# Patient Record
Sex: MALE | Race: WHITE | NOT HISPANIC OR LATINO | Employment: OTHER | URBAN - METROPOLITAN AREA
[De-identification: names, ages, dates, MRNs, and addresses within clinical notes are randomized per-mention and may not be internally consistent; named-entity substitution may affect disease eponyms.]

---

## 2017-10-17 ENCOUNTER — HISTORICAL (OUTPATIENT)
Dept: LAB | Facility: HOSPITAL | Age: 72
End: 2017-10-17

## 2017-10-17 LAB
ALBUMIN SERPL-MCNC: 3.8 GM/DL (ref 3.4–5)
ALBUMIN/GLOB SERPL: 1.2 RATIO (ref 1.1–2)
ALP SERPL-CCNC: 113 UNIT/L (ref 46–116)
ALT SERPL-CCNC: 35 UNIT/L (ref 12–78)
APPEARANCE, UA: CLEAR
AST SERPL-CCNC: 29 UNIT/L (ref 15–37)
BACTERIA SPEC CULT: NORMAL
BILIRUB SERPL-MCNC: 0.5 MG/DL (ref 0.2–1)
BILIRUB UR QL STRIP: NEGATIVE
BILIRUBIN DIRECT+TOT PNL SERPL-MCNC: 0.12 MG/DL (ref 0–0.2)
BILIRUBIN DIRECT+TOT PNL SERPL-MCNC: 0.36 MG/DL (ref 0–0.8)
BUN SERPL-MCNC: 27.1 MG/DL (ref 7–18)
CALCIUM SERPL-MCNC: 9.1 MG/DL (ref 8.5–10.1)
CHLORIDE SERPL-SCNC: 110 MMOL/L (ref 98–107)
CHOLEST SERPL-MCNC: 179 MG/DL (ref 0–200)
CHOLEST/HDLC SERPL: 5.3 {RATIO} (ref 0–5)
CO2 SERPL-SCNC: 29.1 MMOL/L (ref 21–32)
COLOR UR: YELLOW
CREAT SERPL-MCNC: 1.4 MG/DL (ref 0.6–1.3)
CREAT UR-MCNC: 145.6 MG/DL (ref 30–125)
ERYTHROCYTE [DISTWIDTH] IN BLOOD BY AUTOMATED COUNT: 15.2 % (ref 11.5–17)
EST. AVERAGE GLUCOSE BLD GHB EST-MCNC: 108 MG/DL
GLOBULIN SER-MCNC: 3.3 GM/DL (ref 2.4–3.5)
GLUCOSE (UA): NEGATIVE
GLUCOSE SERPL-MCNC: 105 MG/DL (ref 74–106)
HBA1C MFR BLD: 5.4 % (ref 4.5–6.2)
HCT VFR BLD AUTO: 46.3 % (ref 42–52)
HDLC SERPL-MCNC: 34 MG/DL (ref 40–60)
HGB BLD-MCNC: 15.2 GM/DL (ref 14–18)
HGB UR QL STRIP: NEGATIVE
KETONES UR QL STRIP: NEGATIVE
LDLC SERPL CALC-MCNC: 115 MG/DL (ref 0–129)
LEUKOCYTE ESTERASE UR QL STRIP: NEGATIVE
MAGNESIUM SERPL-MCNC: 1.8 MG/DL (ref 1.8–2.4)
MCH RBC QN AUTO: 28.2 PG (ref 27–31)
MCHC RBC AUTO-ENTMCNC: 32.9 GM/DL (ref 33–36)
MCV RBC AUTO: 85.5 FL (ref 80–94)
NITRITE UR QL STRIP: NEGATIVE
PH UR STRIP: 5.5 [PH] (ref 5–9)
PHOSPHATE SERPL-MCNC: 3.4 MG/DL (ref 2.5–4.9)
PLATELET # BLD AUTO: 149 X10(3)/MCL (ref 130–400)
PMV BLD AUTO: 7.6 FL (ref 7.4–10.4)
POTASSIUM SERPL-SCNC: 4.6 MMOL/L (ref 3.5–5.1)
PROT SERPL-MCNC: 7.1 GM/DL (ref 6.4–8.2)
PROT UR QL STRIP: 30
PROT UR STRIP-MCNC: 49.6 MG/DL
PTH-INTACT SERPL-MCNC: 24.7 PG/DL (ref 14–72)
RBC # BLD AUTO: 5.42 X10(6)/MCL (ref 4.7–6.1)
RBC #/AREA URNS HPF: NORMAL /[HPF]
SODIUM SERPL-SCNC: 145 MMOL/L (ref 136–145)
SP GR UR STRIP: 1.02 (ref 1–1.03)
SQUAMOUS EPITHELIAL, UA: NORMAL
TRIGL SERPL-MCNC: 150 MG/DL
TSH SERPL-ACNC: 2.81 MIU/ML (ref 0.36–3.74)
URATE SERPL-MCNC: 4.9 MG/DL (ref 3.4–7)
UROBILINOGEN UR STRIP-ACNC: 0.2
VLDLC SERPL CALC-MCNC: 30 MG/DL
WBC # SPEC AUTO: 4.9 X10(3)/MCL (ref 4.5–11.5)
WBC #/AREA URNS HPF: NORMAL /HPF

## 2018-04-05 ENCOUNTER — HISTORICAL (OUTPATIENT)
Dept: ADMINISTRATIVE | Facility: HOSPITAL | Age: 73
End: 2018-04-05

## 2018-04-05 LAB
ABS NEUT (OLG): 5.6
ALBUMIN SERPL-MCNC: 4.3 GM/DL (ref 3.4–5)
ALBUMIN/GLOB SERPL: 1.65 {RATIO} (ref 1.5–2.5)
ALP SERPL-CCNC: 59 UNIT/L (ref 38–126)
ALT SERPL-CCNC: 22 UNIT/L (ref 7–52)
AST SERPL-CCNC: 29 UNIT/L (ref 15–37)
BILIRUB SERPL-MCNC: 0.7 MG/DL (ref 0.2–1)
BILIRUBIN DIRECT+TOT PNL SERPL-MCNC: 0.3 MG/DL (ref 0–0.5)
BUN SERPL-MCNC: 26 MG/DL (ref 7–18)
CALCIUM SERPL-MCNC: 8.9 MG/DL (ref 8.5–10)
CHLORIDE SERPL-SCNC: 108 MMOL/L (ref 98–107)
CHOLEST SERPL-MCNC: 127 MG/DL (ref 0–200)
CHOLEST/HDLC SERPL: 3.7 {RATIO}
CO2 SERPL-SCNC: 27 MMOL/L (ref 21–32)
CREAT SERPL-MCNC: 1.19 MG/DL (ref 0.6–1.3)
CREAT/UREA NIT SERPL: 21.8
ERYTHROCYTE [DISTWIDTH] IN BLOOD BY AUTOMATED COUNT: 14.3 % (ref 11.5–17)
GGT SERPL-CCNC: 16 UNIT/L (ref 5–85)
GLOBULIN SER-MCNC: 2.6 GM/DL (ref 1.2–3)
GLUCOSE SERPL-MCNC: 107 MG/DL (ref 74–106)
HCT VFR BLD AUTO: 49.9 % (ref 42–52)
HDLC SERPL-MCNC: 34 MG/DL (ref 35–60)
HGB BLD-MCNC: 16.7 GM/DL (ref 14–18)
LDH SERPL-CCNC: 161 UNIT/L (ref 140–271)
LDLC SERPL CALC-MCNC: 67 MG/DL (ref 0–129)
LYMPHOCYTES # BLD AUTO: 1.4 X10(3)/MCL (ref 0.6–3.4)
LYMPHOCYTES NFR BLD AUTO: 18.5 % (ref 13–40)
MCH RBC QN AUTO: 29.3 PG (ref 27–31.2)
MCHC RBC AUTO-ENTMCNC: 34 GM/DL (ref 32–36)
MCV RBC AUTO: 88 FL (ref 80–94)
MONOCYTES # BLD AUTO: 0.7 X10(3)/MCL (ref 0–1.8)
MONOCYTES NFR BLD AUTO: 9.5 % (ref 0.1–24)
NEUTROPHILS NFR BLD AUTO: 72 % (ref 47–80)
PLATELET # BLD AUTO: 177 X10(3)/MCL (ref 130–400)
PMV BLD AUTO: 9.5 FL
POTASSIUM SERPL-SCNC: 4.2 MMOL/L (ref 3.5–5.1)
PROT SERPL-MCNC: 6.9 GM/DL (ref 6.4–8.2)
RBC # BLD AUTO: 5.7 X10(6)/MCL (ref 4.7–6.1)
SODIUM SERPL-SCNC: 139 MMOL/L (ref 136–145)
T4 FREE SERPL-MCNC: 1.2 NG/DL (ref 0.76–1.46)
TRIGL SERPL-MCNC: 127 MG/DL (ref 30–150)
TSH SERPL-ACNC: 2.79 MIU/ML (ref 0.35–4.94)
VLDLC SERPL CALC-MCNC: 25.4 MG/DL
WBC # SPEC AUTO: 7.7 X10(3)/MCL (ref 4.5–11.5)

## 2018-06-19 ENCOUNTER — HISTORICAL (OUTPATIENT)
Dept: RADIOLOGY | Facility: HOSPITAL | Age: 73
End: 2018-06-19

## 2018-09-18 ENCOUNTER — HISTORICAL (OUTPATIENT)
Dept: ADMINISTRATIVE | Facility: HOSPITAL | Age: 73
End: 2018-09-18

## 2018-09-27 ENCOUNTER — HISTORICAL (OUTPATIENT)
Dept: ADMINISTRATIVE | Facility: HOSPITAL | Age: 73
End: 2018-09-27

## 2018-09-27 LAB
ALBUMIN SERPL-MCNC: 3.5 GM/DL (ref 3.4–5)
ALBUMIN/GLOB SERPL: 1.1 {RATIO}
ALP SERPL-CCNC: 91 UNIT/L (ref 50–136)
ALT SERPL-CCNC: 40 UNIT/L (ref 12–78)
APPEARANCE, UA: CLEAR
AST SERPL-CCNC: 30 UNIT/L (ref 15–37)
BACTERIA SPEC CULT: NORMAL /HPF
BILIRUB SERPL-MCNC: 0.4 MG/DL (ref 0.2–1)
BILIRUB UR QL STRIP: NEGATIVE
BILIRUBIN DIRECT+TOT PNL SERPL-MCNC: 0.2 MG/DL (ref 0–0.2)
BILIRUBIN DIRECT+TOT PNL SERPL-MCNC: 0.2 MG/DL (ref 0–0.8)
BUN SERPL-MCNC: 29 MG/DL (ref 7–18)
CALCIUM SERPL-MCNC: 8.4 MG/DL (ref 8.5–10.1)
CHLORIDE SERPL-SCNC: 111 MMOL/L (ref 98–107)
CHOLEST SERPL-MCNC: 107 MG/DL (ref 0–200)
CHOLEST/HDLC SERPL: 3.3 {RATIO} (ref 0–5)
CO2 SERPL-SCNC: 28 MMOL/L (ref 21–32)
COLOR UR: YELLOW
CREAT SERPL-MCNC: 1.29 MG/DL (ref 0.7–1.3)
CREAT UR-MCNC: 186 MG/DL
ERYTHROCYTE [DISTWIDTH] IN BLOOD BY AUTOMATED COUNT: 13.8 % (ref 11.5–17)
GLOBULIN SER-MCNC: 3.2 GM/DL (ref 2.4–3.5)
GLUCOSE (UA): NEGATIVE
GLUCOSE SERPL-MCNC: 91 MG/DL (ref 74–106)
HCT VFR BLD AUTO: 45.1 % (ref 42–52)
HDLC SERPL-MCNC: 32 MG/DL (ref 35–60)
HGB BLD-MCNC: 14.4 GM/DL (ref 14–18)
HGB UR QL STRIP: NEGATIVE
KETONES UR QL STRIP: NEGATIVE
LDLC SERPL CALC-MCNC: 43 MG/DL (ref 0–129)
LEUKOCYTE ESTERASE UR QL STRIP: NEGATIVE
MAGNESIUM SERPL-MCNC: 2 MG/DL (ref 1.8–2.4)
MCH RBC QN AUTO: 29 PG (ref 27–31)
MCHC RBC AUTO-ENTMCNC: 31.9 GM/DL (ref 33–36)
MCV RBC AUTO: 90.7 FL (ref 80–94)
NITRITE UR QL STRIP: NEGATIVE
PH UR STRIP: 5 [PH] (ref 5–9)
PHOSPHATE SERPL-MCNC: 3.3 MG/DL (ref 2.5–4.9)
PLATELET # BLD AUTO: 162 X10(3)/MCL (ref 130–400)
PMV BLD AUTO: 9.2 FL (ref 9.4–12.4)
POTASSIUM SERPL-SCNC: 4.5 MMOL/L (ref 3.5–5.1)
PROT SERPL-MCNC: 6.7 GM/DL (ref 6.4–8.2)
PROT UR QL STRIP: NEGATIVE
PROT UR STRIP-MCNC: 11 MG/DL
PTH-INTACT SERPL-MCNC: 32 PG/ML (ref 18.4–80.1)
RBC # BLD AUTO: 4.97 X10(6)/MCL (ref 4.7–6.1)
RBC #/AREA URNS HPF: NORMAL /[HPF]
SODIUM SERPL-SCNC: 146 MMOL/L (ref 136–145)
SP GR UR STRIP: 1.02 (ref 1–1.03)
SQUAMOUS EPITHELIAL, UA: NORMAL
TRIGL SERPL-MCNC: 160 MG/DL (ref 30–150)
TSH SERPL-ACNC: 3.3 MIU/L (ref 0.36–3.74)
URATE SERPL-MCNC: 4.6 MG/DL (ref 2.6–7.2)
UROBILINOGEN UR STRIP-ACNC: 1
VLDLC SERPL CALC-MCNC: 32 MG/DL
WBC # SPEC AUTO: 6.2 X10(3)/MCL (ref 4.5–11.5)
WBC #/AREA URNS HPF: NORMAL /[HPF]

## 2018-12-04 ENCOUNTER — HISTORICAL (OUTPATIENT)
Dept: ADMINISTRATIVE | Facility: HOSPITAL | Age: 73
End: 2018-12-04

## 2018-12-20 ENCOUNTER — HISTORICAL (OUTPATIENT)
Dept: PHYSICAL THERAPY | Facility: HOSPITAL | Age: 73
End: 2018-12-20

## 2018-12-21 ENCOUNTER — HISTORICAL (OUTPATIENT)
Dept: PHYSICAL THERAPY | Facility: HOSPITAL | Age: 73
End: 2018-12-21

## 2018-12-24 ENCOUNTER — HISTORICAL (OUTPATIENT)
Dept: PHYSICAL THERAPY | Facility: HOSPITAL | Age: 73
End: 2018-12-24

## 2018-12-27 ENCOUNTER — HISTORICAL (OUTPATIENT)
Dept: PHYSICAL THERAPY | Facility: HOSPITAL | Age: 73
End: 2018-12-27

## 2018-12-28 ENCOUNTER — HISTORICAL (OUTPATIENT)
Dept: PHYSICAL THERAPY | Facility: HOSPITAL | Age: 73
End: 2018-12-28

## 2018-12-31 ENCOUNTER — HISTORICAL (OUTPATIENT)
Dept: PHYSICAL THERAPY | Facility: HOSPITAL | Age: 73
End: 2018-12-31

## 2019-01-02 ENCOUNTER — HISTORICAL (OUTPATIENT)
Dept: PHYSICAL THERAPY | Facility: HOSPITAL | Age: 74
End: 2019-01-02

## 2019-01-04 ENCOUNTER — HISTORICAL (OUTPATIENT)
Dept: PHYSICAL THERAPY | Facility: HOSPITAL | Age: 74
End: 2019-01-04

## 2019-01-07 ENCOUNTER — HISTORICAL (OUTPATIENT)
Dept: PHYSICAL THERAPY | Facility: HOSPITAL | Age: 74
End: 2019-01-07

## 2019-01-08 ENCOUNTER — HISTORICAL (OUTPATIENT)
Dept: ADMINISTRATIVE | Facility: HOSPITAL | Age: 74
End: 2019-01-08

## 2019-01-09 ENCOUNTER — HISTORICAL (OUTPATIENT)
Dept: PHYSICAL THERAPY | Facility: HOSPITAL | Age: 74
End: 2019-01-09

## 2019-01-11 ENCOUNTER — HISTORICAL (OUTPATIENT)
Dept: PHYSICAL THERAPY | Facility: HOSPITAL | Age: 74
End: 2019-01-11

## 2019-01-14 ENCOUNTER — HISTORICAL (OUTPATIENT)
Dept: PHYSICAL THERAPY | Facility: HOSPITAL | Age: 74
End: 2019-01-14

## 2019-01-16 ENCOUNTER — HISTORICAL (OUTPATIENT)
Dept: PHYSICAL THERAPY | Facility: HOSPITAL | Age: 74
End: 2019-01-16

## 2019-01-18 ENCOUNTER — HISTORICAL (OUTPATIENT)
Dept: PHYSICAL THERAPY | Facility: HOSPITAL | Age: 74
End: 2019-01-18

## 2019-01-21 ENCOUNTER — HISTORICAL (OUTPATIENT)
Dept: PHYSICAL THERAPY | Facility: HOSPITAL | Age: 74
End: 2019-01-21

## 2019-01-23 ENCOUNTER — HISTORICAL (OUTPATIENT)
Dept: PHYSICAL THERAPY | Facility: HOSPITAL | Age: 74
End: 2019-01-23

## 2019-01-25 ENCOUNTER — HISTORICAL (OUTPATIENT)
Dept: PHYSICAL THERAPY | Facility: HOSPITAL | Age: 74
End: 2019-01-25

## 2019-01-25 ENCOUNTER — HISTORICAL (OUTPATIENT)
Dept: RADIOLOGY | Facility: HOSPITAL | Age: 74
End: 2019-01-25

## 2019-01-28 ENCOUNTER — HISTORICAL (OUTPATIENT)
Dept: PHYSICAL THERAPY | Facility: HOSPITAL | Age: 74
End: 2019-01-28

## 2019-01-30 ENCOUNTER — HISTORICAL (OUTPATIENT)
Dept: PHYSICAL THERAPY | Facility: HOSPITAL | Age: 74
End: 2019-01-30

## 2019-02-01 ENCOUNTER — HISTORICAL (OUTPATIENT)
Dept: PHYSICAL THERAPY | Facility: HOSPITAL | Age: 74
End: 2019-02-01

## 2019-02-05 ENCOUNTER — HISTORICAL (OUTPATIENT)
Dept: PHYSICAL THERAPY | Facility: HOSPITAL | Age: 74
End: 2019-02-05

## 2019-04-30 ENCOUNTER — HISTORICAL (OUTPATIENT)
Dept: ADMINISTRATIVE | Facility: HOSPITAL | Age: 74
End: 2019-04-30

## 2019-05-21 ENCOUNTER — HISTORICAL (OUTPATIENT)
Dept: ADMINISTRATIVE | Facility: HOSPITAL | Age: 74
End: 2019-05-21

## 2019-09-04 ENCOUNTER — HISTORICAL (OUTPATIENT)
Dept: ADMINISTRATIVE | Facility: HOSPITAL | Age: 74
End: 2019-09-04

## 2019-09-04 LAB
CHOLEST SERPL-MCNC: 117 MG/DL (ref 0–200)
CHOLEST/HDLC SERPL: 2.7 {RATIO} (ref 0–5)
HDLC SERPL-MCNC: 43 MG/DL (ref 35–60)
LDLC SERPL CALC-MCNC: 56 MG/DL (ref 0–129)
TRIGL SERPL-MCNC: 89 MG/DL (ref 30–150)
VLDLC SERPL CALC-MCNC: 18 MG/DL

## 2019-10-01 ENCOUNTER — HISTORICAL (OUTPATIENT)
Dept: ADMINISTRATIVE | Facility: HOSPITAL | Age: 74
End: 2019-10-01

## 2019-10-01 ENCOUNTER — HISTORICAL (OUTPATIENT)
Dept: LAB | Facility: HOSPITAL | Age: 74
End: 2019-10-01

## 2019-10-01 LAB
ALBUMIN SERPL-MCNC: 3.7 GM/DL (ref 3.4–5)
ALBUMIN/GLOB SERPL: 1.3 RATIO (ref 1.1–2)
ALP SERPL-CCNC: 73 UNIT/L (ref 50–136)
ALT SERPL-CCNC: 30 UNIT/L (ref 12–78)
APPEARANCE, UA: CLEAR
AST SERPL-CCNC: 29 UNIT/L (ref 15–37)
BACTERIA SPEC CULT: NORMAL /HPF
BILIRUB SERPL-MCNC: 0.7 MG/DL (ref 0.2–1)
BILIRUB UR QL STRIP: NEGATIVE
BILIRUBIN DIRECT+TOT PNL SERPL-MCNC: 0.3 MG/DL (ref 0–0.5)
BILIRUBIN DIRECT+TOT PNL SERPL-MCNC: 0.4 MG/DL (ref 0–0.8)
BUN SERPL-MCNC: 32 MG/DL (ref 7–18)
CALCIUM SERPL-MCNC: 8.6 MG/DL (ref 8.5–10.1)
CHLORIDE SERPL-SCNC: 108 MMOL/L (ref 98–107)
CHOLEST SERPL-MCNC: 123 MG/DL (ref 0–200)
CHOLEST/HDLC SERPL: 3.6 {RATIO} (ref 0–5)
CO2 SERPL-SCNC: 32 MMOL/L (ref 21–32)
COLOR STL: NORMAL
COLOR UR: YELLOW
CONSISTENCY STL: NORMAL
CREAT SERPL-MCNC: 1.26 MG/DL (ref 0.7–1.3)
ERYTHROCYTE [DISTWIDTH] IN BLOOD BY AUTOMATED COUNT: 14.5 % (ref 11.5–17)
GLOBULIN SER-MCNC: 2.9 GM/DL (ref 2.4–3.5)
GLUCOSE (UA): NEGATIVE
GLUCOSE SERPL-MCNC: 92 MG/DL (ref 74–106)
HCT VFR BLD AUTO: 46.8 % (ref 42–52)
HDLC SERPL-MCNC: 34 MG/DL (ref 35–60)
HEMOCCULT SP1 STL QL: NEGATIVE
HGB BLD-MCNC: 14.9 GM/DL (ref 14–18)
HGB UR QL STRIP: NEGATIVE
KETONES UR QL STRIP: NEGATIVE
LDLC SERPL CALC-MCNC: 61 MG/DL (ref 0–129)
LEUKOCYTE ESTERASE UR QL STRIP: NEGATIVE
MAGNESIUM SERPL-MCNC: 2 MG/DL (ref 1.8–2.4)
MCH RBC QN AUTO: 27.7 PG (ref 27–31)
MCHC RBC AUTO-ENTMCNC: 31.8 GM/DL (ref 33–36)
MCV RBC AUTO: 87 FL (ref 80–94)
NITRITE UR QL STRIP: NEGATIVE
PH UR STRIP: 5 [PH] (ref 5–9)
PHOSPHATE SERPL-MCNC: 3.3 MG/DL (ref 2.5–4.9)
PLATELET # BLD AUTO: 143 X10(3)/MCL (ref 130–400)
PMV BLD AUTO: 9.5 FL (ref 9.4–12.4)
POTASSIUM SERPL-SCNC: 4.4 MMOL/L (ref 3.5–5.1)
PROT SERPL-MCNC: 6.6 GM/DL (ref 6.4–8.2)
PROT UR QL STRIP: NEGATIVE
RBC # BLD AUTO: 5.38 X10(6)/MCL (ref 4.7–6.1)
RBC #/AREA URNS HPF: NORMAL /HPF (ref 0–2)
SODIUM SERPL-SCNC: 143 MMOL/L (ref 136–145)
SP GR UR STRIP: 1.02 (ref 1–1.03)
SQUAMOUS EPITHELIAL, UA: NORMAL /HPF (ref 0–4)
TRIGL SERPL-MCNC: 141 MG/DL (ref 30–150)
TSH SERPL-ACNC: 2.17 MIU/L (ref 0.36–3.74)
URATE SERPL-MCNC: 5.1 MG/DL (ref 2.6–7.2)
UROBILINOGEN UR STRIP-ACNC: 1
VLDLC SERPL CALC-MCNC: 28 MG/DL
WBC # SPEC AUTO: 9.4 X10(3)/MCL (ref 4.5–11.5)
WBC #/AREA URNS HPF: NORMAL /HPF (ref 0–3)

## 2019-10-21 ENCOUNTER — HISTORICAL (OUTPATIENT)
Dept: PHYSICAL THERAPY | Facility: HOSPITAL | Age: 74
End: 2019-10-21

## 2019-10-23 ENCOUNTER — HISTORICAL (OUTPATIENT)
Dept: PHYSICAL THERAPY | Facility: HOSPITAL | Age: 74
End: 2019-10-23

## 2019-10-25 ENCOUNTER — HISTORICAL (OUTPATIENT)
Dept: PHYSICAL THERAPY | Facility: HOSPITAL | Age: 74
End: 2019-10-25

## 2019-10-28 ENCOUNTER — HISTORICAL (OUTPATIENT)
Dept: PHYSICAL THERAPY | Facility: HOSPITAL | Age: 74
End: 2019-10-28

## 2019-10-30 ENCOUNTER — HISTORICAL (OUTPATIENT)
Dept: PHYSICAL THERAPY | Facility: HOSPITAL | Age: 74
End: 2019-10-30

## 2019-10-31 ENCOUNTER — HISTORICAL (OUTPATIENT)
Dept: PHYSICAL THERAPY | Facility: HOSPITAL | Age: 74
End: 2019-10-31

## 2019-11-04 ENCOUNTER — HISTORICAL (OUTPATIENT)
Dept: PHYSICAL THERAPY | Facility: HOSPITAL | Age: 74
End: 2019-11-04

## 2019-11-06 ENCOUNTER — HISTORICAL (OUTPATIENT)
Dept: PHYSICAL THERAPY | Facility: HOSPITAL | Age: 74
End: 2019-11-06

## 2019-11-11 ENCOUNTER — HISTORICAL (OUTPATIENT)
Dept: PHYSICAL THERAPY | Facility: HOSPITAL | Age: 74
End: 2019-11-11

## 2019-11-13 ENCOUNTER — HISTORICAL (OUTPATIENT)
Dept: PHYSICAL THERAPY | Facility: HOSPITAL | Age: 74
End: 2019-11-13

## 2019-11-15 ENCOUNTER — HISTORICAL (OUTPATIENT)
Dept: PHYSICAL THERAPY | Facility: HOSPITAL | Age: 74
End: 2019-11-15

## 2019-11-18 ENCOUNTER — HISTORICAL (OUTPATIENT)
Dept: PHYSICAL THERAPY | Facility: HOSPITAL | Age: 74
End: 2019-11-18

## 2020-02-10 ENCOUNTER — HISTORICAL (OUTPATIENT)
Dept: ADMINISTRATIVE | Facility: HOSPITAL | Age: 75
End: 2020-02-10

## 2020-02-10 LAB
ABS NEUT (OLG): 5.1 X10(3)/MCL (ref 2.1–9.2)
ALBUMIN SERPL-MCNC: 4.2 GM/DL (ref 3.4–5)
ALBUMIN/GLOB SERPL: 2.1 {RATIO} (ref 1.5–2.5)
ALP SERPL-CCNC: 53 UNIT/L (ref 38–126)
ALT SERPL-CCNC: 19 UNIT/L (ref 7–52)
AST SERPL-CCNC: 24 UNIT/L (ref 15–37)
BILIRUB SERPL-MCNC: 0.6 MG/DL (ref 0.2–1)
BILIRUBIN DIRECT+TOT PNL SERPL-MCNC: 0.2 MG/DL (ref 0–0.5)
BILIRUBIN DIRECT+TOT PNL SERPL-MCNC: 0.4 MG/DL
BUN SERPL-MCNC: 36 MG/DL (ref 7–18)
CALCIUM SERPL-MCNC: 8.9 MG/DL (ref 8.5–10)
CHLORIDE SERPL-SCNC: 105 MMOL/L (ref 98–107)
CHOLEST SERPL-MCNC: 108 MG/DL (ref 0–200)
CHOLEST/HDLC SERPL: 4.2 {RATIO}
CO2 SERPL-SCNC: 26 MMOL/L (ref 21–32)
CREAT SERPL-MCNC: 1.44 MG/DL (ref 0.6–1.3)
ERYTHROCYTE [DISTWIDTH] IN BLOOD BY AUTOMATED COUNT: 14.1 % (ref 11.5–17)
GLOBULIN SER-MCNC: 2 GM/DL (ref 1.2–3)
GLUCOSE SERPL-MCNC: 105 MG/DL (ref 74–106)
HCT VFR BLD AUTO: 47.4 % (ref 42–52)
HDLC SERPL-MCNC: 26 MG/DL (ref 35–60)
HGB BLD-MCNC: 15.5 GM/DL (ref 14–18)
LDLC SERPL CALC-MCNC: 59 MG/DL (ref 0–129)
LYMPHOCYTES # BLD AUTO: 1.7 X10(3)/MCL (ref 0.6–3.4)
LYMPHOCYTES NFR BLD AUTO: 22 % (ref 13–40)
MCH RBC QN AUTO: 28 PG (ref 27–31.2)
MCHC RBC AUTO-ENTMCNC: 33 GM/DL (ref 32–36)
MCV RBC AUTO: 86 FL (ref 80–94)
MONOCYTES # BLD AUTO: 0.9 X10(3)/MCL (ref 0.1–1.3)
MONOCYTES NFR BLD AUTO: 11.5 % (ref 0.1–24)
NEUTROPHILS NFR BLD AUTO: 66.5 % (ref 47–80)
PLATELET # BLD AUTO: 179 X10(3)/MCL (ref 130–400)
PMV BLD AUTO: 9.8 FL (ref 9.4–12.4)
POTASSIUM SERPL-SCNC: 4.5 MMOL/L (ref 3.5–5.1)
PROT SERPL-MCNC: 6.2 GM/DL (ref 6.4–8.2)
RBC # BLD AUTO: 5.54 X10(6)/MCL (ref 4.7–6.1)
SODIUM SERPL-SCNC: 142 MMOL/L (ref 136–145)
TRIGL SERPL-MCNC: 125 MG/DL (ref 30–150)
TSH SERPL-ACNC: 2.79 MIU/ML (ref 0.35–4.94)
VLDLC SERPL CALC-MCNC: 25 MG/DL
WBC # SPEC AUTO: 7.7 X10(3)/MCL (ref 4.5–11.5)

## 2020-06-16 ENCOUNTER — HISTORICAL (OUTPATIENT)
Dept: ADMINISTRATIVE | Facility: HOSPITAL | Age: 75
End: 2020-06-16

## 2020-06-16 LAB
APPEARANCE, UA: CLEAR
BACTERIA #/AREA URNS AUTO: ABNORMAL /HPF
BILIRUB UR QL STRIP: NEGATIVE MG/DL
COLOR UR: YELLOW
GLUCOSE (UA): NEGATIVE MG/DL
HGB UR QL STRIP: NEGATIVE UNIT/L
KETONES UR QL STRIP: NEGATIVE MG/DL
LEUKOCYTE ESTERASE UR QL STRIP: NEGATIVE UNIT/L
NITRITE UR QL STRIP.AUTO: NEGATIVE
PH UR STRIP: 7 [PH]
PROT UR QL STRIP: NEGATIVE MG/DL
RBC #/AREA URNS HPF: ABNORMAL /HPF
SP GR UR STRIP: 1.02
SQUAMOUS EPITHELIAL, UA: ABNORMAL /LPF
UROBILINOGEN UR STRIP-ACNC: 2 MG/DL
WBC #/AREA URNS AUTO: ABNORMAL /[HPF]

## 2020-06-18 LAB — FINAL CULTURE: NO GROWTH

## 2020-10-12 ENCOUNTER — HISTORICAL (OUTPATIENT)
Dept: ADMINISTRATIVE | Facility: HOSPITAL | Age: 75
End: 2020-10-12

## 2020-10-12 LAB
ALBUMIN SERPL-MCNC: 3.8 GM/DL (ref 3.4–4.8)
ALBUMIN/GLOB SERPL: 1.4 RATIO (ref 1.1–2)
ALP SERPL-CCNC: 76 UNIT/L (ref 40–150)
ALT SERPL-CCNC: 22 UNIT/L (ref 0–55)
APPEARANCE, UA: ABNORMAL
AST SERPL-CCNC: 28 UNIT/L (ref 5–34)
BACTERIA SPEC CULT: ABNORMAL /HPF
BILIRUB SERPL-MCNC: 0.6 MG/DL
BILIRUB UR QL STRIP: ABNORMAL
BILIRUBIN DIRECT+TOT PNL SERPL-MCNC: 0.3 MG/DL (ref 0–0.5)
BILIRUBIN DIRECT+TOT PNL SERPL-MCNC: 0.3 MG/DL (ref 0–0.8)
BUN SERPL-MCNC: 34.5 MG/DL (ref 8.4–25.7)
CALCIUM SERPL-MCNC: 8.7 MG/DL (ref 8.8–10)
CHLORIDE SERPL-SCNC: 108 MMOL/L (ref 98–107)
CHOLEST SERPL-MCNC: 111 MG/DL
CHOLEST/HDLC SERPL: 4 {RATIO} (ref 0–5)
CO2 SERPL-SCNC: 27 MMOL/L (ref 23–31)
COLOR UR: YELLOW
CREAT SERPL-MCNC: 1.32 MG/DL (ref 0.73–1.18)
CREAT UR-MCNC: 340.5 MG/DL (ref 58–161)
DEPRECATED CALCIDIOL+CALCIFEROL SERPL-MC: 15.9 NG/ML (ref 6.6–49.9)
ERYTHROCYTE [DISTWIDTH] IN BLOOD BY AUTOMATED COUNT: 14.9 % (ref 11.5–17)
GLOBULIN SER-MCNC: 2.7 GM/DL (ref 2.4–3.5)
GLUCOSE (UA): NEGATIVE
GLUCOSE SERPL-MCNC: 88 MG/DL (ref 82–115)
HCT VFR BLD AUTO: 46.2 % (ref 42–52)
HDLC SERPL-MCNC: 31 MG/DL (ref 35–60)
HGB BLD-MCNC: 14.5 GM/DL (ref 14–18)
HGB UR QL STRIP: NEGATIVE
HYALINE CASTS #/AREA URNS LPF: ABNORMAL /[LPF]
KETONES UR QL STRIP: ABNORMAL
LDLC SERPL CALC-MCNC: 59 MG/DL (ref 50–140)
LEUKOCYTE ESTERASE UR QL STRIP: ABNORMAL
MCH RBC QN AUTO: 27.1 PG (ref 27–31)
MCHC RBC AUTO-ENTMCNC: 31.4 GM/DL (ref 33–36)
MCV RBC AUTO: 86.2 FL (ref 80–94)
MUCOUS THREADS URNS QL MICRO: ABNORMAL
NITRITE UR QL STRIP: NEGATIVE
PH UR STRIP: 5 [PH] (ref 5–9)
PLATELET # BLD AUTO: 159 X10(3)/MCL (ref 130–400)
PMV BLD AUTO: 9.9 FL (ref 9.4–12.4)
POTASSIUM SERPL-SCNC: 5.3 MMOL/L (ref 3.5–5.1)
PROT SERPL-MCNC: 6.5 GM/DL (ref 5.8–7.6)
PROT UR QL STRIP: ABNORMAL
PROT UR STRIP-MCNC: 36 MG/DL
PROT/CREAT UR-RTO: 0 MG/DL
PTH-INTACT SERPL-MCNC: 65 PG/ML (ref 8.7–77.1)
RBC # BLD AUTO: 5.36 X10(6)/MCL (ref 4.7–6.1)
RBC #/AREA URNS HPF: ABNORMAL /[HPF]
SODIUM SERPL-SCNC: 143 MMOL/L (ref 136–145)
SP GR UR STRIP: 1.03 (ref 1–1.03)
SQUAMOUS EPITHELIAL, UA: 1 /HPF (ref 0–4)
TRIGL SERPL-MCNC: 106 MG/DL (ref 34–140)
URATE SERPL-MCNC: 5.2 MG/DL (ref 3.8–7)
UROBILINOGEN UR STRIP-ACNC: 1
VLDLC SERPL CALC-MCNC: 21 MG/DL
WBC # SPEC AUTO: 6.7 X10(3)/MCL (ref 4.5–11.5)
WBC #/AREA URNS HPF: 1 /HPF (ref 0–3)

## 2020-10-14 LAB — FINAL CULTURE: NO GROWTH

## 2020-11-20 ENCOUNTER — HISTORICAL (OUTPATIENT)
Dept: PHYSICAL THERAPY | Facility: HOSPITAL | Age: 75
End: 2020-11-20

## 2020-12-10 ENCOUNTER — HISTORICAL (OUTPATIENT)
Dept: PHYSICAL THERAPY | Facility: HOSPITAL | Age: 75
End: 2020-12-10

## 2020-12-15 ENCOUNTER — HISTORICAL (OUTPATIENT)
Dept: PHYSICAL THERAPY | Facility: HOSPITAL | Age: 75
End: 2020-12-15

## 2020-12-17 ENCOUNTER — HISTORICAL (OUTPATIENT)
Dept: PHYSICAL THERAPY | Facility: HOSPITAL | Age: 75
End: 2020-12-17

## 2020-12-22 ENCOUNTER — HISTORICAL (OUTPATIENT)
Dept: PHYSICAL THERAPY | Facility: HOSPITAL | Age: 75
End: 2020-12-22

## 2020-12-29 ENCOUNTER — HISTORICAL (OUTPATIENT)
Dept: PHYSICAL THERAPY | Facility: HOSPITAL | Age: 75
End: 2020-12-29

## 2020-12-31 ENCOUNTER — HISTORICAL (OUTPATIENT)
Dept: PHYSICAL THERAPY | Facility: HOSPITAL | Age: 75
End: 2020-12-31

## 2021-01-06 ENCOUNTER — HISTORICAL (OUTPATIENT)
Dept: PHYSICAL THERAPY | Facility: HOSPITAL | Age: 76
End: 2021-01-06

## 2021-01-07 ENCOUNTER — HISTORICAL (OUTPATIENT)
Dept: PHYSICAL THERAPY | Facility: HOSPITAL | Age: 76
End: 2021-01-07

## 2021-01-12 ENCOUNTER — HISTORICAL (OUTPATIENT)
Dept: PHYSICAL THERAPY | Facility: HOSPITAL | Age: 76
End: 2021-01-12

## 2021-01-14 ENCOUNTER — HISTORICAL (OUTPATIENT)
Dept: PHYSICAL THERAPY | Facility: HOSPITAL | Age: 76
End: 2021-01-14

## 2021-03-03 ENCOUNTER — HISTORICAL (OUTPATIENT)
Dept: ADMINISTRATIVE | Facility: HOSPITAL | Age: 76
End: 2021-03-03

## 2021-03-03 LAB
ABS NEUT (OLG): 5 X10(3)/MCL (ref 2.1–9.2)
ALBUMIN SERPL-MCNC: 3.9 GM/DL (ref 3.4–5)
ALBUMIN/GLOB SERPL: 1.5 {RATIO} (ref 1.5–2.5)
ALP SERPL-CCNC: 69 UNIT/L (ref 38–126)
ALT SERPL-CCNC: 20 UNIT/L (ref 7–52)
AST SERPL-CCNC: 24 UNIT/L (ref 15–37)
BILIRUB SERPL-MCNC: 0.6 MG/DL (ref 0.2–1)
BILIRUBIN DIRECT+TOT PNL SERPL-MCNC: 0.2 MG/DL (ref 0–0.5)
BILIRUBIN DIRECT+TOT PNL SERPL-MCNC: 0.4 MG/DL
BUN SERPL-MCNC: 28 MG/DL (ref 7–18)
CALCIUM SERPL-MCNC: 8.8 MG/DL (ref 8.5–10.1)
CHLORIDE SERPL-SCNC: 108 MMOL/L (ref 98–107)
CHOLEST SERPL-MCNC: 106 MG/DL (ref 0–200)
CHOLEST/HDLC SERPL: 3.4 {RATIO}
CO2 SERPL-SCNC: 25 MMOL/L (ref 21–32)
CREAT SERPL-MCNC: 1.49 MG/DL (ref 0.6–1.3)
ERYTHROCYTE [DISTWIDTH] IN BLOOD BY AUTOMATED COUNT: 15.1 % (ref 11.5–17)
GLOBULIN SER-MCNC: 2.6 GM/DL (ref 1.2–3)
GLUCOSE SERPL-MCNC: 95 MG/DL (ref 74–106)
HCT VFR BLD AUTO: 44.3 % (ref 42–52)
HDLC SERPL-MCNC: 31 MG/DL (ref 35–60)
HGB BLD-MCNC: 14.2 GM/DL (ref 14–18)
LDLC SERPL CALC-MCNC: 63 MG/DL (ref 0–129)
LYMPHOCYTES # BLD AUTO: 1.5 X10(3)/MCL (ref 0.6–3.4)
LYMPHOCYTES NFR BLD AUTO: 20.7 % (ref 13–40)
MCH RBC QN AUTO: 27.7 PG (ref 27–31.2)
MCHC RBC AUTO-ENTMCNC: 32 GM/DL (ref 32–36)
MCV RBC AUTO: 86 FL (ref 80–94)
MONOCYTES # BLD AUTO: 0.8 X10(3)/MCL (ref 0.1–1.3)
MONOCYTES NFR BLD AUTO: 10.8 % (ref 0.1–24)
NEUTROPHILS NFR BLD AUTO: 68.5 % (ref 47–80)
PLATELET # BLD AUTO: 163 X10(3)/MCL (ref 130–400)
PMV BLD AUTO: 9.5 FL (ref 9.4–12.4)
POTASSIUM SERPL-SCNC: 4.2 MMOL/L (ref 3.5–5.1)
PROT SERPL-MCNC: 6.5 GM/DL (ref 6.4–8.2)
PSA SERPL-MCNC: 0.17 NG/ML (ref 0–6.5)
RBC # BLD AUTO: 5.12 X10(6)/MCL (ref 4.7–6.1)
SODIUM SERPL-SCNC: 143 MMOL/L (ref 136–145)
TRIGL SERPL-MCNC: 111 MG/DL (ref 30–150)
TSH SERPL-ACNC: 3.03 MIU/ML (ref 0.35–4.94)
VLDLC SERPL CALC-MCNC: 22.2 MG/DL
WBC # SPEC AUTO: 7.3 X10(3)/MCL (ref 4.5–11.5)

## 2021-04-21 ENCOUNTER — HISTORICAL (OUTPATIENT)
Dept: RADIOLOGY | Facility: HOSPITAL | Age: 76
End: 2021-04-21

## 2021-04-21 LAB
ABS NEUT (OLG): 3.65 X10(3)/MCL (ref 2.1–9.2)
ALBUMIN SERPL-MCNC: 3.6 GM/DL (ref 3.4–4.8)
ALBUMIN/GLOB SERPL: 1.2 RATIO (ref 1.1–2)
ALP SERPL-CCNC: 72 UNIT/L (ref 40–150)
ALT SERPL-CCNC: 27 UNIT/L (ref 0–55)
AMYLASE SERPL-CCNC: 60 UNIT/L (ref 20–160)
AST SERPL-CCNC: 30 UNIT/L (ref 5–34)
BASOPHILS # BLD AUTO: 0 X10(3)/MCL (ref 0–0.2)
BASOPHILS NFR BLD AUTO: 1 %
BILIRUB SERPL-MCNC: 0.7 MG/DL
BILIRUBIN DIRECT+TOT PNL SERPL-MCNC: 0.3 MG/DL (ref 0–0.5)
BILIRUBIN DIRECT+TOT PNL SERPL-MCNC: 0.4 MG/DL (ref 0–0.8)
BUN SERPL-MCNC: 24.8 MG/DL (ref 8.4–25.7)
CALCIUM SERPL-MCNC: 8.9 MG/DL (ref 8.8–10)
CHLORIDE SERPL-SCNC: 108 MMOL/L (ref 98–107)
CO2 SERPL-SCNC: 25 MMOL/L (ref 23–31)
CREAT SERPL-MCNC: 1.06 MG/DL (ref 0.73–1.18)
EOSINOPHIL # BLD AUTO: 0.2 X10(3)/MCL (ref 0–0.9)
EOSINOPHIL NFR BLD AUTO: 3 %
ERYTHROCYTE [DISTWIDTH] IN BLOOD BY AUTOMATED COUNT: 14.4 % (ref 11.5–17)
GLOBULIN SER-MCNC: 3.1 GM/DL (ref 2.4–3.5)
GLUCOSE SERPL-MCNC: 92 MG/DL (ref 82–115)
HCT VFR BLD AUTO: 49.7 % (ref 42–52)
HGB BLD-MCNC: 15.6 GM/DL (ref 14–18)
IMM GRANULOCYTES # BLD AUTO: 0.08 % (ref 0–0.02)
IMM GRANULOCYTES NFR BLD AUTO: 1.2 % (ref 0–0.43)
LIPASE SERPL-CCNC: 39 U/L
LYMPHOCYTES # BLD AUTO: 2.2 X10(3)/MCL (ref 0.6–4.6)
LYMPHOCYTES NFR BLD AUTO: 32 %
MCH RBC QN AUTO: 28 PG (ref 27–31)
MCHC RBC AUTO-ENTMCNC: 31.4 GM/DL (ref 33–36)
MCV RBC AUTO: 89.1 FL (ref 80–94)
MONOCYTES # BLD AUTO: 0.6 X10(3)/MCL (ref 0.1–1.3)
MONOCYTES NFR BLD AUTO: 9 %
NEUTROPHILS # BLD AUTO: 3.65 X10(3)/MCL (ref 1.4–7.9)
NEUTROPHILS NFR BLD AUTO: 54 %
PLATELET # BLD AUTO: 177 X10(3)/MCL (ref 130–400)
PMV BLD AUTO: 8.8 FL (ref 9.4–12.4)
POTASSIUM SERPL-SCNC: 4 MMOL/L (ref 3.5–5.1)
PROT SERPL-MCNC: 6.7 GM/DL (ref 5.8–7.6)
RBC # BLD AUTO: 5.58 X10(6)/MCL (ref 4.7–6.1)
SODIUM SERPL-SCNC: 144 MMOL/L (ref 136–145)
WBC # SPEC AUTO: 6.8 X10(3)/MCL (ref 4.5–11.5)

## 2021-09-08 ENCOUNTER — HISTORICAL (OUTPATIENT)
Dept: ADMINISTRATIVE | Facility: HOSPITAL | Age: 76
End: 2021-09-08

## 2021-09-08 LAB
ABS NEUT (OLG): 4.1 X10(3)/MCL (ref 2.1–9.2)
ALBUMIN SERPL-MCNC: 3.8 GM/DL (ref 3.4–5)
ALBUMIN/GLOB SERPL: 1.52 {RATIO} (ref 1.5–2.5)
ALP SERPL-CCNC: 60 UNIT/L (ref 38–126)
ALT SERPL-CCNC: 23 UNIT/L (ref 7–52)
AST SERPL-CCNC: 30 UNIT/L (ref 15–37)
BILIRUB SERPL-MCNC: 0.6 MG/DL (ref 0.2–1)
BILIRUBIN DIRECT+TOT PNL SERPL-MCNC: 0.2 MG/DL (ref 0–0.5)
BILIRUBIN DIRECT+TOT PNL SERPL-MCNC: 0.4 MG/DL
BUN SERPL-MCNC: 28 MG/DL (ref 7–18)
CALCIUM SERPL-MCNC: 9.1 MG/DL (ref 8.5–10.1)
CHLORIDE SERPL-SCNC: 108 MMOL/L (ref 98–107)
CHOLEST SERPL-MCNC: 107 MG/DL (ref 0–200)
CHOLEST/HDLC SERPL: 3.7 {RATIO}
CO2 SERPL-SCNC: 32 MMOL/L (ref 21–32)
CREAT SERPL-MCNC: 1.26 MG/DL (ref 0.6–1.3)
ERYTHROCYTE [DISTWIDTH] IN BLOOD BY AUTOMATED COUNT: 14.2 % (ref 11.5–17)
EST CREAT CLEARANCE SER (OHS): 78.02 ML/MIN
GLOBULIN SER-MCNC: 2.5 GM/DL (ref 1.2–3)
GLUCOSE SERPL-MCNC: 95 MG/DL (ref 74–106)
HCT VFR BLD AUTO: 46.2 % (ref 42–52)
HDLC SERPL-MCNC: 29 MG/DL (ref 35–60)
HGB BLD-MCNC: 14.9 GM/DL (ref 14–18)
LDLC SERPL CALC-MCNC: 58 MG/DL (ref 0–129)
LYMPHOCYTES # BLD AUTO: 1.4 X10(3)/MCL (ref 0.6–3.4)
LYMPHOCYTES NFR BLD AUTO: 22.8 % (ref 13–40)
MCH RBC QN AUTO: 27.4 PG (ref 27–31.2)
MCHC RBC AUTO-ENTMCNC: 32 GM/DL (ref 32–36)
MCV RBC AUTO: 85 FL (ref 80–94)
MONOCYTES # BLD AUTO: 0.7 X10(3)/MCL (ref 0.1–1.3)
MONOCYTES NFR BLD AUTO: 11.4 % (ref 0.1–24)
NEUTROPHILS NFR BLD AUTO: 65.8 % (ref 47–80)
PLATELET # BLD AUTO: 173 X10(3)/MCL (ref 130–400)
PMV BLD AUTO: 9.2 FL (ref 9.4–12.4)
POTASSIUM SERPL-SCNC: 4.3 MMOL/L (ref 3.5–5.1)
PROT SERPL-MCNC: 6.3 GM/DL (ref 6.4–8.2)
RBC # BLD AUTO: 5.43 X10(6)/MCL (ref 4.7–6.1)
SODIUM SERPL-SCNC: 142 MMOL/L (ref 136–145)
TRIGL SERPL-MCNC: 106 MG/DL (ref 30–150)
VLDLC SERPL CALC-MCNC: 21.2 MG/DL
WBC # SPEC AUTO: 6.2 X10(3)/MCL (ref 4.5–11.5)

## 2021-10-21 ENCOUNTER — HISTORICAL (OUTPATIENT)
Dept: ADMINISTRATIVE | Facility: HOSPITAL | Age: 76
End: 2021-10-21

## 2021-10-21 LAB
ABS NEUT (OLG): 3.54 X10(3)/MCL (ref 2.1–9.2)
ALBUMIN SERPL-MCNC: 3.6 GM/DL (ref 3.4–4.8)
ALBUMIN/GLOB SERPL: 1.3 RATIO (ref 1.1–2)
ALP SERPL-CCNC: 75 UNIT/L (ref 40–150)
ALT SERPL-CCNC: 24 UNIT/L (ref 0–55)
APPEARANCE, UA: NORMAL
AST SERPL-CCNC: 30 UNIT/L (ref 5–34)
BACTERIA SPEC CULT: NORMAL /HPF
BASOPHILS # BLD AUTO: 0 X10(3)/MCL (ref 0–0.2)
BASOPHILS NFR BLD AUTO: 1 %
BILIRUB SERPL-MCNC: 0.6 MG/DL
BILIRUB UR QL STRIP: NEGATIVE
BILIRUBIN DIRECT+TOT PNL SERPL-MCNC: 0.3 MG/DL (ref 0–0.5)
BILIRUBIN DIRECT+TOT PNL SERPL-MCNC: 0.3 MG/DL (ref 0–0.8)
BUN SERPL-MCNC: 22.7 MG/DL (ref 8.4–25.7)
CALCIUM SERPL-MCNC: 9.4 MG/DL (ref 8.7–10.5)
CHLORIDE SERPL-SCNC: 106 MMOL/L (ref 98–107)
CHOLEST SERPL-MCNC: 111 MG/DL
CHOLEST/HDLC SERPL: 4 {RATIO} (ref 0–5)
CO2 SERPL-SCNC: 30 MMOL/L (ref 23–31)
COLOR UR: NORMAL
CREAT SERPL-MCNC: 1.16 MG/DL (ref 0.73–1.18)
CREAT UR-MCNC: 165.4 MG/DL (ref 58–161)
DEPRECATED CALCIDIOL+CALCIFEROL SERPL-MC: 39.9 NG/ML (ref 30–80)
EOSINOPHIL # BLD AUTO: 0.1 X10(3)/MCL (ref 0–0.9)
EOSINOPHIL NFR BLD AUTO: 2 %
ERYTHROCYTE [DISTWIDTH] IN BLOOD BY AUTOMATED COUNT: 14 % (ref 11.5–17)
GLOBULIN SER-MCNC: 2.7 GM/DL (ref 2.4–3.5)
GLUCOSE (UA): NEGATIVE
GLUCOSE SERPL-MCNC: 92 MG/DL (ref 82–115)
HCT VFR BLD AUTO: 48.8 % (ref 42–52)
HDLC SERPL-MCNC: 31 MG/DL (ref 35–60)
HGB BLD-MCNC: 15.5 GM/DL (ref 14–18)
HGB UR QL STRIP: NEGATIVE
KETONES UR QL STRIP: NEGATIVE
LDLC SERPL CALC-MCNC: 64 MG/DL (ref 50–140)
LEUKOCYTE ESTERASE UR QL STRIP: NEGATIVE
LYMPHOCYTES # BLD AUTO: 1.5 X10(3)/MCL (ref 0.6–4.6)
LYMPHOCYTES NFR BLD AUTO: 26 %
MAGNESIUM SERPL-MCNC: 1.9 MG/DL (ref 1.6–2.6)
MCH RBC QN AUTO: 27.5 PG (ref 27–31)
MCHC RBC AUTO-ENTMCNC: 31.8 GM/DL (ref 33–36)
MCV RBC AUTO: 86.5 FL (ref 80–94)
MONOCYTES # BLD AUTO: 0.5 X10(3)/MCL (ref 0.1–1.3)
MONOCYTES NFR BLD AUTO: 9 %
NEUTROPHILS # BLD AUTO: 3.54 X10(3)/MCL (ref 2.1–9.2)
NEUTROPHILS NFR BLD AUTO: 62 %
NITRITE UR QL STRIP: NEGATIVE
PH UR STRIP: 5 [PH] (ref 5–9)
PHOSPHATE SERPL-MCNC: 3.3 MG/DL (ref 2.3–4.7)
PLATELET # BLD AUTO: 175 X10(3)/MCL (ref 130–400)
PMV BLD AUTO: 10.4 FL (ref 9.4–12.4)
POTASSIUM SERPL-SCNC: 4.8 MMOL/L (ref 3.5–5.1)
PROT SERPL-MCNC: 6.3 GM/DL (ref 5.8–7.6)
PROT UR QL STRIP: NEGATIVE
PROT UR STRIP-MCNC: 13.1 MG/DL
PROT/CREAT UR-RTO: 79.2 MG/GM CR
PTH-INTACT SERPL-MCNC: 42.6 PG/ML (ref 8.7–77.1)
RBC # BLD AUTO: 5.64 X10(6)/MCL (ref 4.7–6.1)
RBC #/AREA URNS HPF: NORMAL /[HPF]
SODIUM SERPL-SCNC: 143 MMOL/L (ref 136–145)
SP GR UR STRIP: 1.02 (ref 1–1.03)
SQUAMOUS EPITHELIAL, UA: NORMAL /HPF (ref 0–4)
TRIGL SERPL-MCNC: 81 MG/DL (ref 34–140)
TSH SERPL-ACNC: 2.56 UIU/ML (ref 0.35–4.94)
UROBILINOGEN UR STRIP-ACNC: 1
VLDLC SERPL CALC-MCNC: 16 MG/DL
WBC # SPEC AUTO: 5.8 X10(3)/MCL (ref 4.5–11.5)
WBC #/AREA URNS HPF: NORMAL /HPF

## 2022-02-09 ENCOUNTER — HISTORICAL (OUTPATIENT)
Dept: ADMINISTRATIVE | Facility: HOSPITAL | Age: 77
End: 2022-02-09

## 2022-03-31 ENCOUNTER — HISTORICAL (OUTPATIENT)
Dept: ADMINISTRATIVE | Facility: HOSPITAL | Age: 77
End: 2022-03-31

## 2022-03-31 ENCOUNTER — HISTORICAL (OUTPATIENT)
Dept: RADIOLOGY | Facility: HOSPITAL | Age: 77
End: 2022-03-31

## 2022-03-31 LAB
APPEARANCE, UA: CLEAR
BACTERIA SPEC CULT: NORMAL
BILIRUB UR QL STRIP: NEGATIVE
COLOR UR: YELLOW
GLUCOSE (UA): NEGATIVE
HGB UR QL STRIP: NORMAL
KETONES UR QL STRIP: NEGATIVE
LEUKOCYTE ESTERASE UR QL STRIP: NORMAL
NITRITE UR QL STRIP: POSITIVE
PH UR STRIP: 5.5 [PH] (ref 5–9)
PROT UR QL STRIP: NEGATIVE
RBC #/AREA URNS HPF: NORMAL /[HPF] (ref 0–2)
SP GR UR STRIP: 1.02 (ref 1–1.03)
SQUAMOUS EPITHELIAL, UA: NORMAL
UROBILINOGEN UR STRIP-ACNC: 0.2
WBC #/AREA URNS HPF: NORMAL /[HPF] (ref 0–2)

## 2022-04-10 ENCOUNTER — HISTORICAL (OUTPATIENT)
Dept: ADMINISTRATIVE | Facility: HOSPITAL | Age: 77
End: 2022-04-10
Payer: MEDICARE

## 2022-04-25 VITALS
WEIGHT: 243.81 LBS | BODY MASS INDEX: 36.95 KG/M2 | SYSTOLIC BLOOD PRESSURE: 112 MMHG | HEIGHT: 68 IN | DIASTOLIC BLOOD PRESSURE: 60 MMHG

## 2022-04-30 ENCOUNTER — HISTORICAL (OUTPATIENT)
Dept: ADMINISTRATIVE | Facility: HOSPITAL | Age: 77
End: 2022-04-30

## 2022-05-03 NOTE — HISTORICAL [facility] CERNER
This is a historical note converted from Malik. Formatting and pictures may have been removed.  Please reference Malik for original formatting and attached multimedia. Chief Complaint  5 M[facility] HTN, HLD FAST, EVAL CYST ON BACK  History of Present Illness  Patient presents?for 6-month follow-up for HTN,?HLD,?CAD.  Overall patient states he is doing well without acute complaints or concerns.  He states he takes medication as prescribed. He is independent in his ADLs.  ?  ?  Patient states he had a cyst?on his back-it is currently?healing well. No signs/ symptoms of infection.  ?  Nephrology-Dr. Contreras  Cardiology-Dr. Cleveland  Review of Systems  Constitutional:?no fever, no fatigue, no weakness  Psych: no anxiety,?no?depression, no insomnia  Respiratory:?no cough, no wheezing, no shortness of breath  Cardiovascular:?no chest pain, no palpitations, no edema  Gastrointestinal:?no abdominal pain, no nausea, vomiting, diarrhea or constipation  Hema/Lymph:?no abnormal bruising or bleeding  Endocrine:?no heat or cold intolerance, no excessive thirst or excessive urination  Integumentary:?cyst to mid back  Neurologic: no headache, no dizziness, no weakness or numbness  Physical Exam  Vitals & Measurements  T:?36.7? ?C (Oral)? HR:?56(Peripheral)? BP:?112/60?  HT:?172?cm? HT:?172.00?cm? WT:?110.6?kg? WT:?110.600?kg? BMI:?37.39?  General:?well-developed well-nourished in no acute distress  Neck: no thyromegaly, no?lymphadenopathy, no carotid bruits  Respiratory:?respirations even and unlabored, clear to auscultation bilaterally  Cardiovascular:?regular rate and rhythm without murmurs, gallops or rubs  Musculoskeletal:?full range of motion of all extremities/spine  Integumentary: no rashes or skin lesions present  Neurologic: grossly intact  Assessment/Plan  1.?Hypertension?I10  ?Continue amlodipine 5 mg daily, atenolol 50 mg twice daily,?hydralazine?50 mg 3 times daily, isosorbide 30 mg daily.  CBC, CMP-well call patient with  results.  Ordered:  Automated Diff, Routine collect, 09/08/21 9:16:00 CDT, Blood, Collected, Stop date 09/08/21 9:16:00 CDT, Lab Collect, Hypertension  Hypercholesterolemia  Chronic CHF  GERD - Gastro-esophageal reflux disease  Anxiety, 09/08/21 9:16:00 CDT  CBC w/ Auto Diff, Routine collect, 09/08/21 9:16:00 CDT, Blood, Stop date 09/08/21 9:16:00 CDT, Lab Collect, Hypertension  Hypercholesterolemia  Chronic CHF  GERD - Gastro-esophageal reflux disease  Anxiety, 09/08/21 9:16:00 CDT  Clinic Follow up, *Est. 03/08/22 3:00:00 CST, Order for future visit, Hypertension  Hypercholesterolemia  Chronic CHF  GERD - Gastro-esophageal reflux disease  Anxiety, HLink AFP  Comprehensive Metabolic Panel, Routine collect, 09/08/21 9:16:00 CDT, Blood, Stop date 09/08/21 9:16:00 CDT, Lab Collect, Hypertension  Hypercholesterolemia  Chronic CHF  GERD - Gastro-esophageal reflux disease  Anxiety, 09/08/21 9:16:00 CDT  Lab Collection Request, 09/08/21 9:07:00 CDT, HLINK AMB - AFP, 09/08/21 9:07:00 CDT, Hypertension  Hypercholesterolemia  Chronic CHF  GERD - Gastro-esophageal reflux disease  Anxiety  Lipid Panel, Routine collect, 09/08/21 9:16:00 CDT, Blood, Stop date 09/08/21 9:16:00 CDT, Lab Collect, Hypertension  Hypercholesterolemia  Chronic CHF  GERD - Gastro-esophageal reflux disease  Anxiety, 09/08/21 9:16:00 CDT  Office/Outpatient Visit Level 3 Established [id number] PC, Hypertension  Hypercholesterolemia  Chronic CHF  GERD - Gastro-esophageal reflux disease  Anxiety, HLINK AMB - AFP, 09/08/21 9:07:00 CDT  ?  2.?Hypercholesterolemia?E78.00  ?Continue rosuvastatin 20 mg daily,?fenofibrate?200 mg daily.  FLP-well call patient with results.  Ordered:  Automated Diff, Routine collect, 09/08/21 9:16:00 CDT, Blood, Collected, Stop date 09/08/21 9:16:00 CDT, Lab Collect, Hypertension  Hypercholesterolemia  Chronic CHF  GERD - Gastro-esophageal reflux disease  Anxiety, 09/08/21 9:16:00 CDT  CBC w/ Auto Diff,  Routine collect, 09/08/21 9:16:00 CDT, Blood, Stop date 09/08/21 9:16:00 CDT, Lab Collect, Hypertension  Hypercholesterolemia  Chronic CHF  GERD - Gastro-esophageal reflux disease  Anxiety, 09/08/21 9:16:00 CDT  Clinic Follow up, *Est. 03/08/22 3:00:00 CST, Order for future visit, Hypertension  Hypercholesterolemia  Chronic CHF  GERD - Gastro-esophageal reflux disease  Anxiety, HLink AFP  Comprehensive Metabolic Panel, Routine collect, 09/08/21 9:16:00 CDT, Blood, Stop date 09/08/21 9:16:00 CDT, Lab Collect, Hypertension  Hypercholesterolemia  Chronic CHF  GERD - Gastro-esophageal reflux disease  Anxiety, 09/08/21 9:16:00 CDT  Lab Collection Request, 09/08/21 9:07:00 CDT, HLINK AMB - AFP, 09/08/21 9:07:00 CDT, Hypertension  Hypercholesterolemia  Chronic CHF  GERD - Gastro-esophageal reflux disease  Anxiety  Lipid Panel, Routine collect, 09/08/21 9:16:00 CDT, Blood, Stop date 09/08/21 9:16:00 CDT, Lab Collect, Hypertension  Hypercholesterolemia  Chronic CHF  GERD - Gastro-esophageal reflux disease  Anxiety, 09/08/21 9:16:00 CDT  Office/Outpatient Visit Level 3 Established [id number] PC, Hypertension  Hypercholesterolemia  Chronic CHF  GERD - Gastro-esophageal reflux disease  Anxiety, HLINK AMB - AFP, 09/08/21 9:07:00 CDT  ?  3.?CAD (coronary artery disease)?I25.10  ?Continue?aspirin 81 mg daily,?Xarelto 2.5 mg twice daily.  ?  4.?GERD - Gastro-esophageal reflux disease?K21.9  ?Continue Dexilant 60 mg daily.  Ordered:  Automated Diff, Routine collect, 09/08/21 9:16:00 CDT, Blood, Collected, Stop date 09/08/21 9:16:00 CDT, Lab Collect, Hypertension  Hypercholesterolemia  Chronic CHF  GERD - Gastro-esophageal reflux disease  Anxiety, 09/08/21 9:16:00 CDT  CBC w/ Auto Diff, Routine collect, 09/08/21 9:16:00 CDT, Blood, Stop date 09/08/21 9:16:00 CDT, Lab Collect, Hypertension  Hypercholesterolemia  Chronic CHF  GERD - Gastro-esophageal reflux disease  Anxiety, 09/08/21 9:16:00 CDT  Clinic  Follow up, *Est. 03/08/22 3:00:00 CST, Order for future visit, Hypertension  Hypercholesterolemia  Chronic CHF  GERD - Gastro-esophageal reflux disease  Anxiety, HLink AFP  Comprehensive Metabolic Panel, Routine collect, 09/08/21 9:16:00 CDT, Blood, Stop date 09/08/21 9:16:00 CDT, Lab Collect, Hypertension  Hypercholesterolemia  Chronic CHF  GERD - Gastro-esophageal reflux disease  Anxiety, 09/08/21 9:16:00 CDT  Lab Collection Request, 09/08/21 9:07:00 CDT, HLINK AMB - AFP, 09/08/21 9:07:00 CDT, Hypertension  Hypercholesterolemia  Chronic CHF  GERD - Gastro-esophageal reflux disease  Anxiety  Lipid Panel, Routine collect, 09/08/21 9:16:00 CDT, Blood, Stop date 09/08/21 9:16:00 CDT, Lab Collect, Hypertension  Hypercholesterolemia  Chronic CHF  GERD - Gastro-esophageal reflux disease  Anxiety, 09/08/21 9:16:00 CDT  Office/Outpatient Visit Level 3 Established [id number] PC, Hypertension  Hypercholesterolemia  Chronic CHF  GERD - Gastro-esophageal reflux disease  Anxiety, HLINK AMB - AFP, 09/08/21 9:07:00 CDT  ?  5.?Anxiety?F41.9  ?Continue citalopram 30 mg daily, diazepam?2 mg as needed for breakthrough anxiety.  Ordered:  Automated Diff, Routine collect, 09/08/21 9:16:00 CDT, Blood, Collected, Stop date 09/08/21 9:16:00 CDT, Lab Collect, Hypertension  Hypercholesterolemia  Chronic CHF  GERD - Gastro-esophageal reflux disease  Anxiety, 09/08/21 9:16:00 CDT  CBC w/ Auto Diff, Routine collect, 09/08/21 9:16:00 CDT, Blood, Stop date 09/08/21 9:16:00 CDT, Lab Collect, Hypertension  Hypercholesterolemia  Chronic CHF  GERD - Gastro-esophageal reflux disease  Anxiety, 09/08/21 9:16:00 CDT  Clinic Follow up, *Est. 03/08/22 3:00:00 CST, Order for future visit, Hypertension  Hypercholesterolemia  Chronic CHF  GERD - Gastro-esophageal reflux disease  Anxiety, HLink AFP  Comprehensive Metabolic Panel, Routine collect, 09/08/21 9:16:00 CDT, Blood, Stop date 09/08/21 9:16:00 CDT, Lab Collect, Hypertension   Hypercholesterolemia  Chronic CHF  GERD - Gastro-esophageal reflux disease  Anxiety, 09/08/21 9:16:00 CDT  Lab Collection Request, 09/08/21 9:07:00 CDT, HLINK AMB - AFP, 09/08/21 9:07:00 CDT, Hypertension  Hypercholesterolemia  Chronic CHF  GERD - Gastro-esophageal reflux disease  Anxiety  Lipid Panel, Routine collect, 09/08/21 9:16:00 CDT, Blood, Stop date 09/08/21 9:16:00 CDT, Lab Collect, Hypertension  Hypercholesterolemia  Chronic CHF  GERD - Gastro-esophageal reflux disease  Anxiety, 09/08/21 9:16:00 CDT  Office/Outpatient Visit Level 3 Established [id number] PC, Hypertension  Hypercholesterolemia  Chronic CHF  GERD - Gastro-esophageal reflux disease  Anxiety, HLINK AMB - AFP, 09/08/21 9:07:00 CDT  ?  Will send copy of labs to?cardiologist/Dr. Cleveland and?nephrologist?Dr. Contreras.?  Follow-up in 6 months, sooner if needed.  ?  Referrals  Clinic Follow up, *Est. 03/08/22 3:00:00 CST, Order for future visit, Hypertension  Hypercholesterolemia  Chronic CHF  GERD - Gastro-esophageal reflux disease  Anxiety, HLink AFP   Problem List/Past Medical History  Ongoing  Angina  Anxiety  CAD (coronary artery disease)  Chronic CHF  Chronic GERD  DDD (degenerative disc disease), lumbar  Diverticulitis  GERD - Gastro-esophageal reflux disease  Hypercholesterolemia  Hypertension  Osteoarthritis of right knee  Prostatitis  Sleep apnea  Historical  allergies  Contusion of right knee  Shortness of breath  staph infection post-op  Procedure/Surgical History  Replacement of Right Knee Joint with Synthetic Substitute, Cemented, Open Approach (12/17/2018)  Total Knee Arthroplasty (Right) (12/17/2018)  Colonoscopy (06/01/2016)  Catheter placement in coronary artery(s) for coronary angiography, including intraprocedural injection(s) for coronary angiography, imaging supervision and interpretation; with left heart catheterization including intraprocedural injection(s) for left isaac (03/09/2016)  Dilation of Coronary Artery,  One Site with Drug-eluting Intraluminal Device, Percutaneous Approach (03/09/2016)  Fluoroscopy of Left Heart using Low Osmolar Contrast (03/09/2016)  Fluoroscopy of Multiple Coronary Arteries using Low Osmolar Contrast (03/09/2016)  HOSPITAL OBSERVATION SERVICE, PER HOUR (03/09/2016)  Intravascular Doppler velocity and/or pressure derived coronary flow reserve measurement (coronary vessel or graft) during coronary angiography including pharmacologically induced stress; initial vessel (List separately in addition to code for primary pro (03/09/2016)  Measurement of Arterial Flow, Coronary, Percutaneous Approach (03/09/2016)  Measurement of Arterial Pressure, Coronary, Percutaneous Approach (03/09/2016)  Measurement of Cardiac Sampling and Pressure, Left Heart, Percutaneous Approach (03/09/2016)  Percutaneous transcatheter placement of drug eluting intracoronary stent(s), with coronary angioplasty when performed; single major coronary artery or branch (03/09/2016)  Dilation of Coronary Artery, One Site with Drug-eluting Intraluminal Device, Percutaneous Approach (11/19/2015)  Fluoroscopy of Left Heart using Low Osmolar Contrast (11/19/2015)  Fluoroscopy of Multiple Coronary Arteries using Low Osmolar Contrast (11/19/2015)  Left Heart Cath w/COR Angio Ventriculography (11/19/2015)  Measurement of Cardiac Sampling and Pressure, Left Heart, Percutaneous Approach (11/19/2015)  Place Drug Eluting Stent Perc Single (11/19/2015)  Angiocardiography of left heart structures (07/23/2014)  Catheter placement in coronary artery(s) for coronary angiography, including intraprocedural injection(s) for coronary angiography, imaging supervision and interpretation; with left heart catheterization including intraprocedural i. (07/23/2014)  Coronary arteriography using two catheters (07/23/2014)  INSERTION OF NON-DRUG-ELUTING CORONARY ARTERY STENT(S) (07/23/2014)  Insertion of one vascular stent (07/23/2014)  Intracoronary Stent  Placement 1st Vessel (None) (07/23/2014)  Left heart cardiac catheterization (07/23/2014)  Left Heart Cath w/COR Angio Ventriculogr (None) (07/23/2014)  Percutaneous transcatheter placement of intracoronary stent(s), with coronary angioplasty when performed; single major coronary artery or branch. (07/23/2014)  Percutaneous Transluminal Coronary Angioplasty [PTCA] (07/23/2014)  Procedure on single vessel (07/23/2014)  STENT, NON-COATED/NON-COVERED, WITH DELIVERY SYSTEM (07/23/2014)  Angiocardiography of left heart structures (04/22/2014)  Catheter placement in coronary artery(s) for coronary angiography, including intraprocedural injection(s) for coronary angiography, imaging supervision and interpretation; with left heart catheterization including intraprocedural i. (04/22/2014)  Coronary arteriography using a single catheter (04/22/2014)  INSERTION OF DRUG-ELUTING CORONARY ARTERY STENT(S) (04/22/2014)  INSERTION OF NON-DRUG-ELUTING CORONARY ARTERY STENT(S) (04/22/2014)  Insertion of three vascular stents (04/22/2014)  Intracoronary Stent Placement 1st Vessel (04/22/2014)  Left heart cardiac catheterization (04/22/2014)  Left Heart Cath w/COR Angio Ventriculogr (04/22/2014)  Percutaneous transcatheter placement of drug eluting intracoronary stent(s), with coronary angioplasty when performed; single major coronary artery or branch (04/22/2014)  Percutaneous transcatheter placement of intracoronary stent(s), with coronary angioplasty when performed; single major coronary artery or branch. (04/22/2014)  Percutaneous Transluminal Coronary Angioplasty [PTCA] (04/22/2014)  Place Drug Eluting Stent Perc Single (04/22/2014)  Procedure on two vessels (04/22/2014)  back surgery x 2  bilateral hip replacement  left TKR  left wrist ganglion cyst removed  neck surgery  penile implant  right inguinal hernia repair  right rotator cuff repair  TURP - Transurethral resection of prostate   Medications  amLODIPine 5 mg oral tablet, 5  mg= 1 tab(s), Oral, Daily, 3 refills  aspirin 81 mg oral Delayed Release (EC) tablet, 81 mg= 1 tab(s), Oral, At Bedtime  atenolol 50 mg oral tablet, 50 mg= 1 tab(s), Oral, BID, 3 refills  citalopram 20 mg oral tablet, See Instructions, 2 refills  Dexilant 60 mg oral delayed release capsule, See Instructions  DIAZepam 10 mg oral tablet, 10 mg= 1 tab(s), Oral, q8hr, PRN, 1 refills  fenofibrate micronized 200 mg oral capsule, See Instructions  hydrALAZINE 25 mg oral tablet, See Instructions  isosorbide MONOnitrate 30 mg oral tablet, Extended Release, 30 mg= 1 tab(s), Oral, Daily  niacin 500 mg oral capsule, 500 mg= 1 cap(s), Oral, BID  nitroglycerin 0.4 mg sublingual TAB, See Instructions  Norco 10 mg-325 mg oral tablet, 1 tab(s), Oral, q4hr, PRN  potassium gluconate 100 mg oral tablet, extended release  potassium gluconate 550 mg oral tablet, 1 tab(s), Oral, 5x/Day  rosuvastatin 20 mg oral tablet, See Instructions, 3 refills  sucralfate 1 g oral tablet, See Instructions, 3 refills  triamcinolone 0.1% topical cream, 1 junior, TOP, TID  triamcinolone 0.1% topical cream, 1 junior, TOP, TID  Vitamin B12 50 mcg oral tablet, 50 mcg= 1 tab(s), Oral, Daily  Vitamin D3 1000 intl units (25 mcg) oral capsule, 1000 IntUnit= 1 cap(s), Oral, Daily  Wixela Inhub 250 mcg-50 mcg inhalation powder, See Instructions, 3 refills  Xarelto 2.5 mg oral tablet, 2.5 mg= 1 tab(s), Oral, BID, 3 refills  Allergies  atorvastatin?(body aches)  metoprolol?(body aches)  morphine?(Hallucinations)  Social History  Abuse/Neglect  No, 09/08/2021  Alcohol - Denies Alcohol Use, 04/17/2014  Current, Beer, 1-2 times per year, 03/13/2018  Employment/School  Retired, 03/13/2018  Exercise  Home/Environment  Lives with Alone., 01/29/2021  Nutrition/Health  Regular, 03/13/2018  Substance Use - Denies Substance Abuse, 04/17/2014  Never, 03/13/2018  Tobacco - Denies Tobacco Use, 04/17/2014  Never (less than 100 in lifetime), N/A, 09/08/2021  Family History  Acute  myocardial infarction.: Sister.  CAD - Coronary artery disease: Sister.  Cancer: Brother.  Congestive heart disease.: Mother.  Hypertension.: Mother and Father.  Immunizations  Vaccine Date Status Comments   zoster vaccine, inactivated 06/01/2021 Recorded    COVID-19 mRNA, LNP-S, PF - Moderna 04/14/2021 Recorded    COVID-19 mRNA, LNP-S, PF - Moderna 03/18/2021 Recorded    zoster vaccine, inactivated 03/01/2021 Recorded    influenza virus vaccine, inactivated 09/16/2020 Given    influenza virus vaccine, inactivated 10/03/2019 Given    pneumococcal 13-valent conjugate vaccine 09/18/2018 Given    influenza virus vaccine, inactivated 09/18/2018 Given    tetanus/diphtheria/pertussis, acel(Tdap) 10/31/2017 Given    influenza virus vaccine, inactivated 09/07/2017 Recorded    pneumococcal 23-polyvalent vaccine 05/11/2014 Given Nursing Judgment   Health Maintenance  Health Maintenance  ???Pending?(in the next year)  ??? ??OverDue  ??? ? ? ?HF-ACEI/ARB Prescribed if Clinically Indicated due??12/04/19??and every 1??year(s)  ??? ? ? ?Aspirin Therapy for CVD Prevention due??12/18/19??and every 1??year(s)  ??? ? ? ?HF-Heart Failure Education due??10/20/20??and every 1??year(s)  ??? ? ? ?Advance Directive due??01/02/21??and every 1??year(s)  ??? ? ? ?Cognitive Screening due??01/02/21??and every 1??year(s)  ??? ? ? ?Fall Risk Assessment due??01/02/21??and every 1??year(s)  ??? ? ? ?Functional Assessment due??01/02/21??and every 1??year(s)  ??? ??Due?  ??? ? ? ?ADL Screening due??09/08/21??and every 1??year(s)  ??? ? ? ?HF-Ejection Fraction Past 13 Months if Hospitalized due??09/08/21??and every 1??year(s)  ??? ? ? ?HF-Fluid Restriction/Low Sodium Diet Education due??09/08/21??and every 1??year(s)  ??? ? ? ?HF-LVEF due??09/08/21??Unknown Frequency  ??? ? ? ?Hypertension Management-Education due??09/08/21??and every 1??year(s)  ??? ??Due In Future?  ??? ? ? ?Obesity Screening not due until??01/01/22??and every 1??year(s)  ??? ? ?  ?Medicare Annual Wellness Exam not due until??03/03/22??and every 1??year(s)  ??? ? ? ?Hypertension Management-BMP not due until??04/21/22??and every 1??year(s)  ???Satisfied?(in the past 1 year)  ??? ??Satisfied?  ??? ? ? ?Blood Pressure Screening on??09/08/21.??Satisfied by Cathi Martino LPN  ??? ? ? ?Body Mass Index Check on??09/08/21.??Satisfied by Cathi Martino LPN  ??? ? ? ?Coronary Artery Disease Maintenance-Lipid Lowering Therapy on??03/04/21.??Satisfied by Daina SIDDIQUI, ANDREA, Frank  ??? ? ? ?Depression Screening on??09/08/21.??Satisfied by Cathi Martino LPN  ??? ? ? ?Diabetes Screening on??04/21/21.??Satisfied by Tamia Ramsay  ??? ? ? ?Functional Assessment on??11/20/20.??Satisfied by Chanda Casiano PT  ??? ? ? ?HF-Beta Blocker Prescribed if Clinically Indicated on??07/12/21.??Satisfied by ANDREA Ramsay Teddi  ??? ? ? ?Hypertension Management-Blood Pressure on??09/08/21.??Satisfied by Cathi Martino LPN  ??? ? ? ?Influenza Vaccine on??09/16/20.??Satisfied by Payton Garcia CMA  ??? ? ? ?Lipid Screening on??03/03/21.??Satisfied by Katlin Garcia  ??? ? ? ?Medicare Annual Wellness Exam on??03/03/21.??Satisfied by ANDREA Calles, Frank  ??? ? ? ?Obesity Screening on??09/08/21.??Satisfied by Cathi Martino LPN  ??? ? ? ?Zoster Vaccine on??06/01/21.??Satisfied by Cathi Martino LPN  ?      Patient condition discussed?in detail with nurse practitioner.? Agree with plan of care?and follow-up.

## 2022-05-03 NOTE — HISTORICAL [facility] CERNER
This is a historical note converted from [employer]. Formatting and pictures may have been removed.  Please reference [employer] for original formatting and attached multimedia. Chief Complaint   WELLNESS CPX FAST, REFILL PAIN MEDS  History of Present Illness  75 year old WM presents for annual  wellness  PMH: CAD (s/p stent), HTN, GERD, DDD, BPH, Depression  ?   , retired  No tob, NO EtOH  Exercise: active around house but otherwise limited 2/2 DDD (L-spine)  ?   Cardio (Dr Uribe)  Dr Jett (Ortho)- s/p Rt TKA in 2019  GI (Dr Horowitz)  Renal (Dr Contreras)  ?   Overall, doing well. Staying active around the house and feeling well.  Only complaint is slight increase in urinary hesitancy, post void dribble over past few months. No dysuria or hematuria.  Review of Systems  Constitutional:?no fever, fatigue, weakness  Eye:?no vision loss, eye redness, drainage, or pain  ENMT:?no sore throat, ear pain, sinus pain/congestion  Respiratory:?no cough, no wheezing, no shortness of breath  Cardiovascular:?no chest pain, no palpitations, no edema  Gastrointestinal:?no nausea, vomiting, or diarrhea. No abdominal pain  Genitourinary:?no dysuria, no urinary frequency or urgency, no hematuria  Hema/Lymph:?no abnormal bruising or bleeding  Endocrine:?no heat or cold intolerance, no excessive thirst or excessive urination  Musculoskeletal:?no muscle or joint pain, no joint swelling  Integumentary:?no skin rash or abnormal lesion  Neurologic: no headache, no dizziness, no weakness or numbness  Physical Exam  Vitals & Measurements  T:?36.7? ?C (Oral)? HR:?52(Peripheral)? BP:?106/52?  HT:?172?cm? HT:?172.00?cm? WT:?110.3?kg? WT:?110.300?kg? BMI:?37.28?  General:?well-developed well-nourished in no acute distress  Eye: PERRLA, EOMI, clear conjunctiva, eyelids normal  HENT:?TMs/ear canals clear, oropharynx without erythema/exudate, oropharynx and nasal mucosal surfaces moist, no maxillary/frontal sinus tenderness to palpation  Neck:  full range of motion, no thyromegaly or lymphadenopathy  Respiratory:?clear to auscultation bilaterally  Cardiovascular:?regular rate and rhythm without murmurs, gallops or rubs  Gastrointestinal:?soft, non-tender, non-distended with normal bowel sounds, without masses to palpation  Genitourinary: no CVA tenderness to palpation  Musculoskeletal:?full range of motion of all extremities/spine without limitation or discomfort  Integumentary: no rashes or skin lesions present  Neurologic: cranial nerves intact, no signs of peripheral neurological deficit, motor/sensory function intact  Assessment/Plan  1.?Medicare annual wellness visit, subsequent?Z00.00  ?LABS: CBC, CMP, TSH, FLP, PSA  ?  2.?HLD (hyperlipidemia)?E78.5  Continue Rosuvastatin 20mg PO q day  Ordered:  Lipid Panel, Routine collect, 03/03/21 7:35:00 CST, Blood, Stop date 03/03/21 7:35:00 CST, Lab Collect, HLD (hyperlipidemia), 03/03/21 7:35:00 CST  ?  3.?HTN (hypertension)?I10  ?Continue Atenolol 50mg PO BID  ?Continue?Hydralazine to 50mg PO TID  ?Continue Amlodipine 5mg PO q day  Ordered:  Automated Diff, Routine collect, 03/03/21 7:35:00 CST, Blood, Collected, Stop date 03/03/21 7:35:00 CST, Lab Collect, HTN (hypertension), 03/03/21 7:35:00 CST  CBC w/ Auto Diff, Routine collect, 03/03/21 7:35:00 CST, Blood, Stop date 03/03/21 7:35:00 CST, Lab Collect, HTN (hypertension), 03/03/21 7:35:00 CST  Comprehensive Metabolic Panel, Routine collect, 03/03/21 7:35:00 CST, Blood, Stop date 03/03/21 7:35:00 CST, Lab Collect, HTN (hypertension), 03/03/21 7:35:00 CST  ?  4.?Chronic CHF?I50.9  ?  5.?GERD - Gastro-esophageal reflux disease?K21.9  Continue Dexilant 60mg PO q day  ?  6.?Depression?F32.9  ?Continue Citalopram 20mg PO q day  ?  7.?DDD (degenerative disc disease), lumbar?M51.36  Continue Norco PRN breakthrough pain  ?Unable to tolerate NSAIDs 2/2 to Hx of PUD  ?  8.?CAD (coronary artery disease)?I25.10  s/p Stents x6 (most recent-2015)  Continue Xarelto 2.5mg  PO BID  ?  9.?BPH (benign prostatic hyperplasia)?N40.0  ?Start Flomax 0.4mg PO q day (30,5)  ?  Orders:  acetaminophen-HYDROcodone, 1 tab(s), Oral, q4hr, PRN PRN as needed for pain, MEDICALLY NECESSARY >7 DAYS, # 90 tab(s), 0 Refill(s), Pharmacy: [facility], 172, cm, Height/Length Dosing, 03/03/21 7:25:00 CST, 110.3, kg, Weight Dosing, 03/03/21 7:25:00 CST  tamsulosin, 0.4 mg = 1 cap(s), Oral, Daily, # 30 cap(s), 5 Refill(s), Pharmacy: [facility], 172, cm, Height/Length Dosing, 03/03/21 7:25:00 CST, 110.3, kg, Weight Dosing, 03/03/21 7:25:00 CST  Prostate Specific Antigen, Routine collect, 03/03/21 7:35:00 CST, Blood, Stop date 03/03/21 7:35:00 CST, Lab Collect, Screening for prostate cancer, 03/03/21 7:35:00 CST  Thyroid Stimulating Hormone, Routine collect, 03/03/21 7:35:00 CST, Blood, Stop date 03/03/21 7:35:00 CST, Lab Collect, Fatigue, 03/03/21 7:35:00 CST   Problem List/Past Medical History  Ongoing  Angina  Anxiety  Chronic CHF  Chronic GERD  DDD (degenerative disc disease), lumbar  Diverticulitis  GERD - Gastro-esophageal reflux disease  Hypercholesterolemia  Hypertension  Malaise  Osteoarthritis of right knee  Prostatitis  Sleep apnea  Historical  allergies  Contusion of right knee  Shortness of breath  staph infection post-op  Procedure/Surgical History  Replacement of Right Knee Joint with Synthetic Substitute, Cemented, Open Approach (12/17/2018)  Total Knee Arthroplasty (Right) (12/17/2018)  Colonoscopy (06/01/2016)  Catheter placement in coronary artery(s) for coronary angiography, including intraprocedural injection(s) for coronary angiography, imaging supervision and interpretation; with left heart catheterization including intraprocedural injection(s) for left isaac (03/09/2016)  Dilation of Coronary Artery, One Site with Drug-eluting Intraluminal Device, Percutaneous Approach (03/09/2016)  Fluoroscopy of Left Heart using Low Osmolar Contrast (03/09/2016)  Fluoroscopy of  Multiple Coronary Arteries using Low Osmolar Contrast (03/09/2016)  HOSPITAL OBSERVATION SERVICE, PER HOUR (03/09/2016)  Intravascular Doppler velocity and/or pressure derived coronary flow reserve measurement (coronary vessel or graft) during coronary angiography including pharmacologically induced stress; initial vessel (List separately in addition to code for primary pro (03/09/2016)  Measurement of Arterial Flow, Coronary, Percutaneous Approach (03/09/2016)  Measurement of Arterial Pressure, Coronary, Percutaneous Approach (03/09/2016)  Measurement of Cardiac Sampling and Pressure, Left Heart, Percutaneous Approach (03/09/2016)  Percutaneous transcatheter placement of drug eluting intracoronary stent(s), with coronary angioplasty when performed; single major coronary artery or branch (03/09/2016)  Dilation of Coronary Artery, One Site with Drug-eluting Intraluminal Device, Percutaneous Approach (11/19/2015)  Fluoroscopy of Left Heart using Low Osmolar Contrast (11/19/2015)  Fluoroscopy of Multiple Coronary Arteries using Low Osmolar Contrast (11/19/2015)  Left Heart Cath w/COR Angio Ventriculography (11/19/2015)  Measurement of Cardiac Sampling and Pressure, Left Heart, Percutaneous Approach (11/19/2015)  Place Drug Eluting Stent Perc Single (11/19/2015)  Angiocardiography of left heart structures (07/23/2014)  Catheter placement in coronary artery(s) for coronary angiography, including intraprocedural injection(s) for coronary angiography, imaging supervision and interpretation; with left heart catheterization including intraprocedural i. (07/23/2014)  Coronary arteriography using two catheters (07/23/2014)  INSERTION OF NON-DRUG-ELUTING CORONARY ARTERY STENT(S) (07/23/2014)  Insertion of one vascular stent (07/23/2014)  Intracoronary Stent Placement 1st Vessel (None) (07/23/2014)  Left heart cardiac catheterization (07/23/2014)  Left Heart Cath w/COR Angio Ventriculogr (None) (07/23/2014)  Percutaneous  transcatheter placement of intracoronary stent(s), with coronary angioplasty when performed; single major coronary artery or branch. (07/23/2014)  Percutaneous Transluminal Coronary Angioplasty [PTCA] (07/23/2014)  Procedure on single vessel (07/23/2014)  STENT, NON-COATED/NON-COVERED, WITH DELIVERY SYSTEM (07/23/2014)  Angiocardiography of left heart structures (04/22/2014)  Catheter placement in coronary artery(s) for coronary angiography, including intraprocedural injection(s) for coronary angiography, imaging supervision and interpretation; with left heart catheterization including intraprocedural i. (04/22/2014)  Coronary arteriography using a single catheter (04/22/2014)  INSERTION OF DRUG-ELUTING CORONARY ARTERY STENT(S) (04/22/2014)  INSERTION OF NON-DRUG-ELUTING CORONARY ARTERY STENT(S) (04/22/2014)  Insertion of three vascular stents (04/22/2014)  Intracoronary Stent Placement 1st Vessel (04/22/2014)  Left heart cardiac catheterization (04/22/2014)  Left Heart Cath w/COR Angio Ventriculogr (04/22/2014)  Percutaneous transcatheter placement of drug eluting intracoronary stent(s), with coronary angioplasty when performed; single major coronary artery or branch (04/22/2014)  Percutaneous transcatheter placement of intracoronary stent(s), with coronary angioplasty when performed; single major coronary artery or branch. (04/22/2014)  Percutaneous Transluminal Coronary Angioplasty [PTCA] (04/22/2014)  Place Drug Eluting Stent Perc Single (04/22/2014)  Procedure on two vessels (04/22/2014)  back surgery x 2  bilateral hip replacement  left TKR  left wrist ganglion cyst removed  neck surgery  penile implant  right inguinal hernia repair  right rotator cuff repair  TURP - Transurethral resection of prostate   Medications  amLODIPine 5 mg oral tablet, 5 mg= 1 tab(s), Oral, Daily, 3 refills  aspirin 81 mg oral Delayed Release (EC) tablet, 81 mg= 1 tab(s), Oral, At Bedtime  atenolol 50 mg oral tablet, 50 mg= 1 tab(s),  Oral, BID, 3 refills  Benadryl 25 mg oral capsule, 25 mg= 1 cap(s), Oral, TID, PRN  citalopram 20 mg oral tablet, See Instructions, 2 refills  Colace 100 mg oral capsule, 200 mg= 2 cap(s), Oral, Daily  Dexilant 60 mg oral delayed release capsule, 60 mg= 1 cap(s), Oral, Daily, 3 refills  DIAZepam 10 mg oral tablet, 10 mg= 1 tab(s), Oral, q8hr, PRN, 1 refills  fenofibrate micronized 200 mg oral capsule, See Instructions  Flomax 0.4 mg oral capsule, 0.4 mg= 1 cap(s), Oral, Daily, 5 refills  hydrALAZINE 25 mg oral tablet, 50 mg= 2 tab(s), Oral, TID, 2 refills  Lotrisone 1%-0.05% topical cream, 1 junior, TOP, BID, 1 refills  Nitrostat 0.4 mg sublingual tab, 0.4 mg= 1 tab(s), SL, q5min, PRN  Norco 10 mg-325 mg oral tablet, 1 tab(s), Oral, q4hr, PRN  potassium gluconate 100 mg oral tablet, extended release  potassium gluconate 550 mg oral tablet, 1 tab(s), Oral, 5x/Day  rosuvastatin 20 mg oral tablet, See Instructions, 3 refills  sucralfate 1 g oral tablet, See Instructions  Vitamin D3 1000 intl units (25 mcg) oral capsule, 1000 IntUnit= 1 cap(s), Oral, Daily  Wixela Inhub 250 mcg-50 mcg inhalation powder, See Instructions, 3 refills  Xarelto 2.5 mg oral tablet, 2.5 mg= 1 tab(s), Oral, BID, 3 refills  Yosprala 81 mg-40 mg oral delayed release tablet, 1 tab(s), Oral, Daily, 3 refills  Allergies  atorvastatin?(body aches)  metoprolol?(body aches)  morphine?(Hallucinations)  Social History  Abuse/Neglect  No, 03/03/2021  Alcohol - Denies Alcohol Use, 04/17/2014  Current, Beer, 1-2 times per year, 03/13/2018  Employment/School  Retired, 03/13/2018  Exercise  Home/Environment  Lives with Alone., 01/29/2021  Nutrition/Health  Regular, 03/13/2018  Substance Use - Denies Substance Abuse, 04/17/2014  Never, 03/13/2018  Tobacco - Denies Tobacco Use, 04/17/2014  Never (less than 100 in lifetime), N/A, 03/03/2021  Family History  Acute myocardial infarction.: Sister.  CAD - Coronary artery disease: Sister.  Cancer: Brother.  Congestive  heart disease.: Mother.  Hypertension.: Mother and Father.  Immunizations  Vaccine Date Status Comments   zoster vaccine, inactivated 03/01/2021 Recorded    influenza virus vaccine, inactivated 09/16/2020 Given    influenza virus vaccine, inactivated 10/03/2019 Given    pneumococcal 13-valent conjugate vaccine 09/18/2018 Given    influenza virus vaccine, inactivated 09/18/2018 Given    tetanus/diphtheria/pertussis, acel(Tdap) 10/31/2017 Given    influenza virus vaccine, inactivated 09/07/2017 Recorded    pneumococcal 23-polyvalent vaccine 05/11/2014 Given Nursing Judgment   Health Maintenance  Health Maintenance  ???Pending?(in the next year)  ??? ??OverDue  ??? ? ? ?HF-ACEI/ARB Prescribed if Clinically Indicated due??12/04/19??and every 1??year(s)  ??? ? ? ?Aspirin Therapy for CVD Prevention due??12/18/19??and every 1??year(s)  ??? ? ? ?Influenza Vaccine due??10/01/20??and every 1??day(s)  ??? ? ? ?HF-Heart Failure Education due??10/20/20??and every 1??year(s)  ??? ? ? ?Advance Directive due??01/02/21??and every 1??year(s)  ??? ? ? ?Cognitive Screening due??01/02/21??and every 1??year(s)  ??? ? ? ?Fall Risk Assessment due??01/02/21??and every 1??year(s)  ??? ? ? ?Functional Assessment due??01/02/21??and every 1??year(s)  ??? ? ? ?Medicare Annual Wellness Exam due??02/10/21??and every 1??year(s)  ??? ??Due?  ??? ? ? ?ADL Screening due??03/03/21??and every 1??year(s)  ??? ? ? ?HF-Ejection Fraction Past 13 Months if Hospitalized due??03/03/21??and every 1??year(s)  ??? ? ? ?HF-Fluid Restriction/Low Sodium Diet Education due??03/03/21??and every 1??year(s)  ??? ? ? ?HF-LVEF due??03/03/21??Unknown Frequency  ??? ? ? ?Hypertension Management-Education due??03/03/21??and every 1??year(s)  ??? ??Due In Future?  ??? ? ? ?Hypertension Management-BMP not due until??10/12/21??and every 1??year(s)  ??? ? ? ?Obesity Screening not due until??01/01/22??and every 1??year(s)  ???Satisfied?(in the past 1 year)  ??? ??Satisfied?  ???  ? ? ?Blood Pressure Screening on??03/03/21.??Satisfied by Cathi Martino LPN  ??? ? ? ?Body Mass Index Check on??03/03/21.??Satisfied by Cathi Martino LPN  ??? ? ? ?Coronary Artery Disease Maintenance-Lipid Lowering Therapy on??07/07/20.??Satisfied by Daina SIDDIQUI, FNP, Frank  ??? ? ? ?Depression Screening on??03/03/21.??Satisfied by Cathi Martino LPN  ??? ? ? ?Diabetes Screening on??10/12/20.??Satisfied by Elizabeth Amador MT.  ??? ? ? ?Functional Assessment on??11/20/20.??Satisfied by Chanda Casiano PT  ??? ? ? ?HF-Beta Blocker Prescribed if Clinically Indicated on??07/20/20.??Satisfied by Daina SIDDIQUI, FNP, Frank  ??? ? ? ?Hypertension Management-Blood Pressure on??03/03/21.??Satisfied by Cathi Martino LPN  ??? ? ? ?Influenza Vaccine on??09/16/20.??Satisfied by Payton Garcia CMA  ??? ? ? ?Lipid Screening on??10/12/20.??Satisfied by Elizabeth Amador MT.  ??? ? ? ?Obesity Screening on??03/03/21.??Satisfied by Cathi Martino LPN  ??? ? ? ?Zoster Vaccine on??03/01/21.??Satisfied by Cathi Martino LPN  ?      Patient condition discussed?in detail with nurse practitioner.? Agree with plan of care?and follow-up.   08-Jun-2020

## 2022-05-03 NOTE — HISTORICAL [facility] CERNER
This is a historical note converted from [employer]. Formatting and pictures may have been removed.  Please reference [employer] for original formatting and attached multimedia. Chief Complaint  WELLNESS CPX FAST DOSCUSS HTN  History of Present Illness  74 year old WM presents for annual  wellness  PMH: CAD (s/p stent), HTN, GERD, DDD, BPH  ?  , retired  No tob, NO EtOH  Exercise: active around house but otherwise limited 2/2 DDD (L-spine)  ?  Cardio (Dr Uribe)  Dr Jett (Ortho)- s/p Rt TKA in 2019  GI (Dr Horowitz)  Renal (Dr Contreras)  ?  At last visit, we changed his Coreg to Atenolol due to complaint of essential tremor. Since starting new med, his tremor has gone away. However, his BP has been slightly high.  Review of Systems  Constitutional:?no fever, fatigue, weakness  Eye:?no vision loss, eye redness, drainage, or pain  ENMT:?no sore throat, ear pain, sinus pain/congestion  Respiratory:?no cough, no wheezing, no shortness of breath  Cardiovascular:?no chest pain, no palpitations, no edema  Gastrointestinal:?no nausea, vomiting, or diarrhea. No abdominal pain  Genitourinary:?no dysuria, no urinary frequency or urgency, no hematuria  Hema/Lymph:?no abnormal bruising or bleeding  Endocrine:?no heat or cold intolerance, no excessive thirst or excessive urination  Musculoskeletal:?no muscle or joint pain, no joint swelling  Integumentary:?no skin rash or abnormal lesion  Neurologic: no headache, no dizziness, no weakness or numbness  ?  Physical Exam  Vitals & Measurements  T:?36.8? ?C (Oral)? HR:?60(Peripheral)? BP:?130/80?  HT:?172?cm? WT:?109.9?kg? BMI:?37.15?  General:?well-developed well-nourished in no acute distress  Eye: PERRLA, EOMI, clear conjunctiva, eyelids normal  HENT:?TMs/ear canals clear, oropharynx without erythema/exudate, oropharynx and nasal mucosal surfaces moist, no maxillary/frontal sinus tenderness to palpation  Neck: full range of motion, no thyromegaly or  lymphadenopathy  Respiratory:?clear to auscultation bilaterally  Cardiovascular:?regular rate and rhythm without murmurs, gallops or rubs  Gastrointestinal:?soft, non-tender, non-distended with normal bowel sounds, without masses to palpation  Genitourinary: no CVA tenderness to palpation  Musculoskeletal:?full range of motion of all extremities/spine without limitation or discomfort  Integumentary: no rashes or skin lesions present  Neurologic: cranial nerves intact, no signs of peripheral neurological deficit, motor/sensory function intact  ?  Assessment/Plan  1.?Wellness examination?Z00.00  ?LABS: CBC, CMP, TSH, FLP  2.?Hypercholesterolemia?E78.00  ?Continue Rosuvastatin 20mg PO q day  3.?Hypertension?I10  ?Continue Atenolol 50mg PO BID  ?Increase Hydralazine to 50mg PO TID  4.?GERD - Gastro-esophageal reflux disease?K21.9  ?Continue Dexilant 60mg PO q day  5.?Essential tremor?G25.0  ?Improved since starting BB  ?Continue Atenolol 50mg PO BID  6.?DDD (degenerative disc disease), lumbar?M51.36  Continue Norco PRN breakthrough pain  ?Unable to tolerate NSAIDs 2/2 to Hx of PUD   Problem List/Past Medical History  Ongoing  allergies  Angina  Anxiety  Chronic CHF  Chronic GERD  Contusion of right knee  DDD (degenerative disc disease), lumbar  Diverticulitis  GERD - Gastro-esophageal reflux disease  Hypercholesterolemia  Hypertension  Malaise  Osteoarthritis of knee  Osteoarthritis of right knee  Prostatitis  Shortness of breath  Sleep apnea  Historical  staph infection post-op  Procedure/Surgical History  Replacement of Right Knee Joint with Synthetic Substitute, Cemented, Open Approach (12/17/2018)  Total Knee Arthroplasty (Right) (12/17/2018)  Catheter placement in coronary artery(s) for coronary angiography, including intraprocedural injection(s) for coronary angiography, imaging supervision and interpretation; with left heart catheterization including intraprocedural injection(s) for left isaac  (03/09/2016)  Dilation of Coronary Artery, One Site with Drug-eluting Intraluminal Device, Percutaneous Approach (03/09/2016)  Fluoroscopy of Left Heart using Low Osmolar Contrast (03/09/2016)  Fluoroscopy of Multiple Coronary Arteries using Low Osmolar Contrast (03/09/2016)  HOSPITAL OBSERVATION SERVICE, PER HOUR (03/09/2016)  Intravascular Doppler velocity and/or pressure derived coronary flow reserve measurement (coronary vessel or graft) during coronary angiography including pharmacologically induced stress; initial vessel (List separately in addition to code for primary pro (03/09/2016)  Measurement of Arterial Flow, Coronary, Percutaneous Approach (03/09/2016)  Measurement of Arterial Pressure, Coronary, Percutaneous Approach (03/09/2016)  Measurement of Cardiac Sampling and Pressure, Left Heart, Percutaneous Approach (03/09/2016)  Percutaneous transcatheter placement of drug eluting intracoronary stent(s), with coronary angioplasty when performed; single major coronary artery or branch (03/09/2016)  Dilation of Coronary Artery, One Site with Drug-eluting Intraluminal Device, Percutaneous Approach (11/19/2015)  Fluoroscopy of Left Heart using Low Osmolar Contrast (11/19/2015)  Fluoroscopy of Multiple Coronary Arteries using Low Osmolar Contrast (11/19/2015)  Left Heart Cath w/COR Angio Ventriculography (11/19/2015)  Measurement of Cardiac Sampling and Pressure, Left Heart, Percutaneous Approach (11/19/2015)  Place Drug Eluting Stent Perc Single (11/19/2015)  Angiocardiography of left heart structures (07/23/2014)  Catheter placement in coronary artery(s) for coronary angiography, including intraprocedural injection(s) for coronary angiography, imaging supervision and interpretation; with left heart catheterization including intraprocedural i. (07/23/2014)  Coronary arteriography using two catheters (07/23/2014)  INSERTION OF NON-DRUG-ELUTING CORONARY ARTERY STENT(S) (07/23/2014)  Insertion of one vascular  stent (07/23/2014)  Intracoronary Stent Placement 1st Vessel (None) (07/23/2014)  Left heart cardiac catheterization (07/23/2014)  Left Heart Cath w/COR Angio Ventriculogr (None) (07/23/2014)  Percutaneous transcatheter placement of intracoronary stent(s), with coronary angioplasty when performed; single major coronary artery or branch. (07/23/2014)  Percutaneous Transluminal Coronary Angioplasty [PTCA] (07/23/2014)  Procedure on single vessel (07/23/2014)  STENT, NON-COATED/NON-COVERED, WITH DELIVERY SYSTEM (07/23/2014)  Angiocardiography of left heart structures (04/22/2014)  Catheter placement in coronary artery(s) for coronary angiography, including intraprocedural injection(s) for coronary angiography, imaging supervision and interpretation; with left heart catheterization including intraprocedural i. (04/22/2014)  Coronary arteriography using a single catheter (04/22/2014)  INSERTION OF DRUG-ELUTING CORONARY ARTERY STENT(S) (04/22/2014)  INSERTION OF NON-DRUG-ELUTING CORONARY ARTERY STENT(S) (04/22/2014)  Insertion of three vascular stents (04/22/2014)  Intracoronary Stent Placement 1st Vessel (04/22/2014)  Left heart cardiac catheterization (04/22/2014)  Left Heart Cath w/COR Angio Ventriculogr (04/22/2014)  Percutaneous transcatheter placement of drug eluting intracoronary stent(s), with coronary angioplasty when performed; single major coronary artery or branch (04/22/2014)  Percutaneous transcatheter placement of intracoronary stent(s), with coronary angioplasty when performed; single major coronary artery or branch. (04/22/2014)  Percutaneous Transluminal Coronary Angioplasty [PTCA] (04/22/2014)  Place Drug Eluting Stent Perc Single (04/22/2014)  Procedure on two vessels (04/22/2014)  back surgery x 2  bilateral hip replacement  left TKR  left wrist ganglion cyst removed  neck surgery  penile implant  right inguinal hernia repair  right rotator cuff repair  TURP - Transurethral resection of prostate    Medications  aspirin 81 mg oral Delayed Release (EC) tablet, 81 mg= 1 tab(s), Oral, At Bedtime  atenolol 50 mg oral tablet, 50 mg= 1 tab(s), Oral, BID, 5 refills  Bactroban 2% Ointment (22.5 gmTube), 1 junior, Both Nostrils, BID  Benadryl 25 mg oral capsule, 25 mg= 1 cap(s), Oral, TID, PRN  CARVEDILOL 6.25MG TABLETS, 6.25 mg= 1 tab(s), Oral, BID  citalopram 20 mg oral tablet, See Instructions, 2 refills  Colace 100 mg oral capsule, 200 mg= 2 cap(s), Oral, Daily  Dexilant 60 mg oral delayed release capsule, See Instructions, 2 refills  Dexilant 60 mg oral delayed release capsule, 60 mg= 1 cap(s), Oral, Daily, 3 refills  DIAZepam 10 mg oral tablet, 10 mg= 1 tab(s), Oral, q8hr, PRN, 3 refills  fenofibrate micronized 200 mg oral capsule, See Instructions, 2 refills  hydrALAZINE 25 mg oral tablet, 25 mg= 1 tab(s), Oral, QID, 2 refills  Lotrisone 1%-0.05% topical cream, 1 junior, TOP, BID, 1 refills  Nitrostat 0.4 mg sublingual tab, 0.4 mg= 1 tab(s), SL, q5min, PRN  Norco 10 mg-325 mg oral tablet, 1 tab(s), Oral, q4hr, PRN  potassium gluconate 100 mg oral tablet, extended release  rosuvastatin 20 mg oral tablet, See Instructions, 3 refills  sucralfate 1 g oral tablet, 1 gm= 1 tab(s), Oral, TID, 2 refills  Wixela Inhub 250 mcg-50 mcg inhalation powder, See Instructions, 2 refills  Xarelto 2.5 mg oral tablet, 2.5 mg= 1 tab(s), Oral, BID, 3 refills  Yosprala 81 mg-40 mg oral delayed release tablet, 1 tab(s), Oral, Daily, 3 refills  Allergies  atorvastatin?(body aches)  metoprolol?(body aches)  morphine?(Hallucinations)  Social History  Abuse/Neglect  No, 02/10/2020  Alcohol - Denies Alcohol Use, 04/17/2014  Current, Beer, 1-2 times per year, 03/13/2018  Employment/School  Retired, 03/13/2018  Exercise  Home/Environment  Lives with Spouse., 03/13/2018  Nutrition/Health  Regular, 03/13/2018  Substance Use - Denies Substance Abuse, 04/17/2014  Never, 03/13/2018  Tobacco - Denies Tobacco Use, 04/17/2014  Never (less than 100 in  lifetime), N/A, 02/10/2020  Family History  Acute myocardial infarction.: Sister.  CAD - Coronary artery disease: Sister.  Cancer: Brother.  Congestive heart disease.: Mother.  Hypertension.: Mother and Father.  Immunizations  Vaccine Date Status Comments   influenza virus vaccine, inactivated 10/03/2019 Given    pneumococcal 13-valent conjugate vaccine 09/18/2018 Given    influenza virus vaccine, inactivated 09/18/2018 Given    tetanus/diphtheria/pertussis, acel(Tdap) 10/31/2017 Given    influenza virus vaccine, inactivated 09/07/2017 Recorded    pneumococcal 23-polyvalent vaccine 05/11/2014 Given Nursing Judgment   Health Maintenance  Health Maintenance  ???Pending?(in the next year)  ??? ??OverDue  ??? ? ? ?Pneumococcal Vaccine due??and every?  ??? ? ? ?HF-LVEF due??03/09/18??and every 2??year(s)  ??? ? ? ?HF-ACEI/ARB Prescribed if Clinically Indicated due??12/04/19??and every 1??year(s)  ??? ? ? ?Advance Directive due??01/01/20??and every 1??year(s)  ??? ? ? ?Geriatric Depression Screening due??01/01/20??and every 1??year(s)  ??? ??Due?  ??? ? ? ?Aspirin Therapy for CVD Prevention due??12/18/19??and every 1??year(s)  ??? ? ? ?Alcohol Misuse Screening due??01/01/20??and every 1??year(s)  ??? ? ? ?Cognitive Screening due??01/01/20??and every 1??year(s)  ??? ? ? ?Fall Risk Assessment due??01/01/20??and every 1??year(s)  ??? ? ? ?Functional Assessment due??01/01/20??and every 1??year(s)  ??? ? ? ?ADL Screening due??02/10/20??and every 1??year(s)  ??? ? ? ?HF-Ejection Fraction Past 13 Months if Hospitalized due??02/10/20??and every 1??year(s)  ??? ? ? ?HF-Fluid Restriction/Low Sodium Diet Education due??02/10/20??Variable frequency  ??? ? ? ?Hypertension Management-Education due??02/10/20??and every 1??year(s)  ??? ? ? ?Zoster Vaccine due??02/10/20??and every 100??year(s)  ??? ??Due In Future?  ??? ? ? ?Hypertension Management-BMP not due until??09/30/20??and every 1??year(s)  ??? ? ? ?HF-Heart Failure Education not  due until??10/20/20??and every 1??year(s)  ??? ? ? ?Obesity Screening not due until??01/01/21??and every 1??year(s)  ??? ? ? ?Hypertension Management-Blood Pressure not due until??02/09/21??and every 1??year(s)  ???Satisfied?(in the past 1 year)  ??? ??Satisfied?  ??? ? ? ?Blood Pressure Screening on??02/10/20.??Satisfied by Cathi Martino LPN  ??? ? ? ?Body Mass Index Check on??02/10/20.??Satisfied by Cathi Martino LPN  ??? ? ? ?Colorectal Screening ([facility]) on??10/01/19.??Satisfied by Palmer Gonzales  ??? ? ? ?Coronary Artery Disease Maintenance-Lipid Lowering Therapy on??08/07/19.??Satisfied by Daina SIDDIQUI, ANDREA, Frank  ??? ? ? ?Depression Screening on??02/10/20.??Satisfied by Cathi Martino LPN  ??? ? ? ?Diabetes Screening on??10/01/19.??Satisfied by Hannah Crane.  ??? ? ? ?HF-Beta Blocker Prescribed if Clinically Indicated on??01/23/20.??Satisfied by Daina SIDDIQUI, ANDREA, Frank  ??? ? ? ?Hypertension Management-Blood Pressure on??02/10/20.??Satisfied by Cathi Martino LPN  ??? ? ? ?Influenza Vaccine on??10/03/19.??Satisfied by Cathi Martino LPN  ??? ? ? ?Lipid Screening on??10/01/19.??Satisfied by Hannah Crane.  ??? ? ? ?Obesity Screening on??02/10/20.??Satisfied by Cathi Martino LPN  ?      .np  ?

## 2022-05-03 NOTE — HISTORICAL [facility]
This is a historical note converted from Malik. Formatting and pictures may have been removed.  Please reference Malik for original formatting and attached multimedia. Chief Complaint  C/O LEFT FLANK PAIN RADIATES UP AND DOWN  History of Present Illness  74-year-old white male here for?right lower back pain that started a few days ago and is worsened since.? Also complains of tingling to bilateral lower feet that has started a few days ago as well. ?Has history of unspecified chronic kidney disease, does see allergy. ?Denies fever, chills, body aches,?dysuria.  Review of Systems  General:???Patient reports energy level is??  good. Denies weight change.??Denies fever,chills, night sweats, or weakness.??  Denies fatigue.?  Integument:???Denies any nevus changes, rashes, urticaria,??or sores.??Also denies itching or areas of numbness.  HEENT:???Denies vision changes or eye pain.??No sore throat, ear pain, sinus pressure or discharge.  Cardiovascular:???Denies chest pain, palpitations, dyspnea on exertion, orthopnea.  Respiratory:???No cough, wheezing, shortness of breath, or sputum.  GI:???Denies nausea, emesis, constipation, diarrhea, melena, hematochezia or abdominal pain  :???No frequency, urgency, hematuria, discharge, or incontinence  MS:???Lower back pain  Neuro:???No headaches, numbness in extremities, tingling, dizziness, or weakness  ?  ?  Physical Exam  Vitals & Measurements  T:?37.6? ?C (Oral)? HR:?56(Peripheral)? BP:?122/58?  HT:?172?cm? WT:?111?kg? BMI:?37.52?  General:???Well-developed and??nourished, no apparent distress, alert and oriented??4.  Integument:???Skin is intact with no erythema.??No pustules or vesicles.??No rash or scale. No Lymphadenopathy.??No urticaria.??No abnormal nevi.  HEENT:???PERRLA, EOMI ; TMs and EACs clear, normal turbinates with no erythema, normal mucosa, no sinus tenderness; no erythema or exudate of mouth or pharynx.  Neck:???Supple, no lymphadenopathy, no thyromegaly,  no bruits, no jugular venous distention.  Cardiovascular:???Regular rhythm and rate, no murmurs, radial and dorsal pedal pulses 2+ bilaterally.  Respiratory:???Lungs clear to auscultation bilaterally, no wheezes, no crackles, no rhonchi.??Good air movement.  Abdomen:???NABS, soft, nontender, no hepatosplenomegaly, no masses, no guarding or rebound.?  MS:???Right SI joint tenderness radiating to right hip,?straight leg raise positive to right.  Neuro:???CN II-XII intact, reflexes 2+ throughout, no motor sensory deficits, negative cerebellar??tests.  ?  ?  Assessment/Plan  1.?Sciatic pain?M54.30  ?Betamethasone 6 mg IM x1 dose now, instructed on heat to affected area 15 to 20 minutes?as needed.? UA shows?small amount of RBCs,?with history of unspecified kidney disease we will culture urine to ensure there is no other issues.  Ordered:  Lab Collection Request, 06/16/20 11:17:00 CDT, [facility], 06/16/20 11:17:00 CDT, Sciatic pain  Urine Culture [id number], Routine collect, 06/16/20 11:17:00 CDT, Order for future visit, Urine, Stop date 06/16/20 11:17:00 CDT, Sciatic pain  ?  Orders:  Office/Outpatient Visit Level 3 Established [id number] PC, Sciatica of right side, [facility], 06/16/20 11:13:00 CDT   Problem List/Past Medical History  Ongoing  allergies  Angina  Anxiety  Chronic CHF  Chronic GERD  Contusion of right knee  DDD (degenerative disc disease), lumbar  Diverticulitis  GERD - Gastro-esophageal reflux disease  Hypercholesterolemia  Hypertension  Malaise  Osteoarthritis of knee  Osteoarthritis of right knee  Prostatitis  Shortness of breath  Sleep apnea  Historical  staph infection post-op  Procedure/Surgical History  Replacement of Right Knee Joint with Synthetic Substitute, Cemented, Open Approach (12/17/2018)  Total Knee Arthroplasty (Right) (12/17/2018)  Catheter placement in coronary artery(s) for coronary angiography, including intraprocedural injection(s) for coronary angiography, imaging supervision and  interpretation; with left heart catheterization including intraprocedural injection(s) for left isaac (03/09/2016)  Dilation of Coronary Artery, One Site with Drug-eluting Intraluminal Device, Percutaneous Approach (03/09/2016)  Fluoroscopy of Left Heart using Low Osmolar Contrast (03/09/2016)  Fluoroscopy of Multiple Coronary Arteries using Low Osmolar Contrast (03/09/2016)  HOSPITAL OBSERVATION SERVICE, PER HOUR (03/09/2016)  Intravascular Doppler velocity and/or pressure derived coronary flow reserve measurement (coronary vessel or graft) during coronary angiography including pharmacologically induced stress; initial vessel (List separately in addition to code for primary pro (03/09/2016)  Measurement of Arterial Flow, Coronary, Percutaneous Approach (03/09/2016)  Measurement of Arterial Pressure, Coronary, Percutaneous Approach (03/09/2016)  Measurement of Cardiac Sampling and Pressure, Left Heart, Percutaneous Approach (03/09/2016)  Percutaneous transcatheter placement of drug eluting intracoronary stent(s), with coronary angioplasty when performed; single major coronary artery or branch (03/09/2016)  Dilation of Coronary Artery, One Site with Drug-eluting Intraluminal Device, Percutaneous Approach (11/19/2015)  Fluoroscopy of Left Heart using Low Osmolar Contrast (11/19/2015)  Fluoroscopy of Multiple Coronary Arteries using Low Osmolar Contrast (11/19/2015)  Left Heart Cath w/COR Angio Ventriculography (11/19/2015)  Measurement of Cardiac Sampling and Pressure, Left Heart, Percutaneous Approach (11/19/2015)  Place Drug Eluting Stent Perc Single (11/19/2015)  Angiocardiography of left heart structures (07/23/2014)  Catheter placement in coronary artery(s) for coronary angiography, including intraprocedural injection(s) for coronary angiography, imaging supervision and interpretation; with left heart catheterization including intraprocedural i. (07/23/2014)  Coronary arteriography using two catheters  (07/23/2014)  INSERTION OF NON-DRUG-ELUTING CORONARY ARTERY STENT(S) (07/23/2014)  Insertion of one vascular stent (07/23/2014)  Intracoronary Stent Placement 1st Vessel (None) (07/23/2014)  Left heart cardiac catheterization (07/23/2014)  Left Heart Cath w/COR Angio Ventriculogr (None) (07/23/2014)  Percutaneous transcatheter placement of intracoronary stent(s), with coronary angioplasty when performed; single major coronary artery or branch. (07/23/2014)  Percutaneous Transluminal Coronary Angioplasty [PTCA] (07/23/2014)  Procedure on single vessel (07/23/2014)  STENT, NON-COATED/NON-COVERED, WITH DELIVERY SYSTEM (07/23/2014)  Angiocardiography of left heart structures (04/22/2014)  Catheter placement in coronary artery(s) for coronary angiography, including intraprocedural injection(s) for coronary angiography, imaging supervision and interpretation; with left heart catheterization including intraprocedural i. (04/22/2014)  Coronary arteriography using a single catheter (04/22/2014)  INSERTION OF DRUG-ELUTING CORONARY ARTERY STENT(S) (04/22/2014)  INSERTION OF NON-DRUG-ELUTING CORONARY ARTERY STENT(S) (04/22/2014)  Insertion of three vascular stents (04/22/2014)  Intracoronary Stent Placement 1st Vessel (04/22/2014)  Left heart cardiac catheterization (04/22/2014)  Left Heart Cath w/COR Angio Ventriculogr (04/22/2014)  Percutaneous transcatheter placement of drug eluting intracoronary stent(s), with coronary angioplasty when performed; single major coronary artery or branch (04/22/2014)  Percutaneous transcatheter placement of intracoronary stent(s), with coronary angioplasty when performed; single major coronary artery or branch. (04/22/2014)  Percutaneous Transluminal Coronary Angioplasty [PTCA] (04/22/2014)  Place Drug Eluting Stent Perc Single (04/22/2014)  Procedure on two vessels (04/22/2014)  back surgery x 2  bilateral hip replacement  left TKR  left wrist ganglion cyst removed  neck surgery  penile  implant  right inguinal hernia repair  right rotator cuff repair  TURP - Transurethral resection of prostate   Medications  amLODIPine 10 mg oral tablet, 10 mg= 1 tab(s), Oral, Daily, 5 refills  aspirin 81 mg oral Delayed Release (EC) tablet, 81 mg= 1 tab(s), Oral, At Bedtime  atenolol 50 mg oral tablet, 50 mg= 1 tab(s), Oral, BID, 5 refills  Benadryl 25 mg oral capsule, 25 mg= 1 cap(s), Oral, TID, PRN  CARVEDILOL 6.25MG TABLETS, 6.25 mg= 1 tab(s), Oral, BID  citalopram 20 mg oral tablet, See Instructions, 2 refills  Colace 100 mg oral capsule, 200 mg= 2 cap(s), Oral, Daily  Dexilant 60 mg oral delayed release capsule, 60 mg= 1 cap(s), Oral, Daily, 3 refills  DIAZepam 10 mg oral tablet, 10 mg= 1 tab(s), Oral, q8hr, PRN, 3 refills  fenofibrate micronized 200 mg oral capsule, See Instructions, 2 refills  hydrALAZINE 25 mg oral tablet, 50 mg= 2 tab(s), Oral, TID, 2 refills  Lotrisone 1%-0.05% topical cream, 1 junior, TOP, BID, 1 refills  Nitrostat 0.4 mg sublingual tab, 0.4 mg= 1 tab(s), SL, q5min, PRN  Norco 10 mg-325 mg oral tablet, 1 tab(s), Oral, q4hr, PRN  potassium gluconate 100 mg oral tablet, extended release  rosuvastatin 20 mg oral tablet, See Instructions, 3 refills  sucralfate 1 g oral tablet, 1 gm= 1 tab(s), Oral, TID, 2 refills  Wixela Inhub 250 mcg-50 mcg inhalation powder, See Instructions, 2 refills  Xarelto 2.5 mg oral tablet, 2.5 mg= 1 tab(s), Oral, BID, 3 refills  Yosprala 81 mg-40 mg oral delayed release tablet, 1 tab(s), Oral, Daily, 3 refills  Allergies  atorvastatin?(body aches)  metoprolol?(body aches)  morphine?(Hallucinations)  Social History  Abuse/Neglect  No, 06/16/2020  Alcohol - Denies Alcohol Use, 04/17/2014  Current, Beer, 1-2 times per year, 03/13/2018  Employment/School  Retired, 03/13/2018  Exercise  Home/Environment  Lives with Spouse., 03/13/2018  Nutrition/Health  Regular, 03/13/2018  Substance Use - Denies Substance Abuse, 04/17/2014  Never, 03/13/2018  Tobacco - Denies Tobacco  Use, 04/17/2014  Never (less than 100 in lifetime), N/A, 06/16/2020  Family History  Acute myocardial infarction.: Sister.  CAD - Coronary artery disease: Sister.  Cancer: Brother.  Congestive heart disease.: Mother.  Hypertension.: Mother and Father.  Immunizations  Vaccine Date Status Comments   influenza virus vaccine, inactivated 10/03/2019 Given    pneumococcal 13-valent conjugate vaccine 09/18/2018 Given    influenza virus vaccine, inactivated 09/18/2018 Given    tetanus/diphtheria/pertussis, acel(Tdap) 10/31/2017 Given    influenza virus vaccine, inactivated 09/07/2017 Recorded    pneumococcal 23-polyvalent vaccine 05/11/2014 Given Nursing Judgment   Health Maintenance  Health Maintenance  ???Pending?(in the next year)  ??? ??OverDue  ??? ? ? ?Pneumococcal Vaccine due??and every?  ??? ? ? ?HF-LVEF due??03/09/18??and every 2??year(s)  ??? ? ? ?HF-ACEI/ARB Prescribed if Clinically Indicated due??12/04/19??and every 1??year(s)  ??? ? ? ?Aspirin Therapy for CVD Prevention due??12/18/19??and every 1??year(s)  ??? ? ? ?Advance Directive due??01/01/20??and every 1??year(s)  ??? ??Due?  ??? ? ? ?ADL Screening due??06/16/20??and every 1??year(s)  ??? ? ? ?HF-Ejection Fraction Past 13 Months if Hospitalized due??06/16/20??and every 1??year(s)  ??? ? ? ?HF-Fluid Restriction/Low Sodium Diet Education due??06/16/20??and every 1??year(s)  ??? ? ? ?Hypertension Management-Education due??06/16/20??and every 1??year(s)  ??? ? ? ?Zoster Vaccine due??06/16/20??and every 100??year(s)  ??? ??Due In Future?  ??? ? ? ?HF-Heart Failure Education not due until??10/20/20??and every 1??year(s)  ??? ? ? ?Alcohol Misuse Screening not due until??01/01/21??and every 1??year(s)  ??? ? ? ?Cognitive Screening not due until??01/01/21??and every 1??year(s)  ??? ? ? ?Fall Risk Assessment not due until??01/01/21??and every 1??year(s)  ??? ? ? ?Functional Assessment not due until??01/01/21??and every 1??year(s)  ??? ? ? ?Obesity Screening not due  until??01/01/21??and every 1??year(s)  ??? ? ? ?Hypertension Management-BMP not due until??02/09/21??and every 1??year(s)  ??? ? ? ?Medicare Annual Wellness Exam not due until??02/10/21??and every 1??year(s)  ???Satisfied?(in the past 1 year)  ??? ??Satisfied?  ??? ? ? ?Alcohol Misuse Screening on??02/10/20.??Satisfied by Cathi Martino LPN  ??? ? ? ?Blood Pressure Screening on??06/16/20.??Satisfied by Cathi Martino LPN  ??? ? ? ?Body Mass Index Check on??06/16/20.??Satisfied by Cathi Martino LPN  ??? ? ? ?Colorectal Screening ([facility]) on??10/01/19.??Satisfied by Palmer Gonzales  ??? ? ? ?Coronary Artery Disease Maintenance-Lipid Lowering Therapy on??08/07/19.??Satisfied by Daina SIDDIQUI, LVP, Frank  ??? ? ? ?Depression Screening on??06/16/20.??Satisfied by Cathi Martino LPN  ??? ? ? ?Diabetes Screening on??02/10/20.??Satisfied by Katlin Garcia  ??? ? ? ?Functional Assessment on??10/21/19.??Satisfied by Jenise Casas PT  ??? ? ? ?HF-Beta Blocker Prescribed if Clinically Indicated on??01/23/20.??Satisfied by Daina BUSTOSN, FNP, Frank  ??? ? ? ?Hypertension Management-Blood Pressure on??06/16/20.??Satisfied by Cathi Martino LPN  ??? ? ? ?Influenza Vaccine on??10/03/19.??Satisfied by Cathi Martino LPN  ??? ? ? ?Lipid Screening on??02/10/20.??Satisfied by Katlin Garcia  ??? ? ? ?Medicare Annual Wellness Exam on??02/10/20.??Satisfied by Daina SIDDIQUI, ANDREA, Frank  ??? ? ? ?Obesity Screening on??06/16/20.??Satisfied by Cathi Martino LPN  ?      Patient condition discussed?in detail with nurse practitioner.? Agree with plan of care?and follow-up.

## 2022-05-03 NOTE — HISTORICAL [facility]
This is a historical note converted from Malik. Formatting and pictures may have been removed.  Please reference Malik for original formatting and attached multimedia. Chief Complaint  HOSPITAL FU FOR ELEVATED B/P  History of Present Illness  Hospital f/u at [facility]. Went there on 4/2/18 because he was weak. BP at time of admit was 230s/110s. States he at several lbs of crawfish the day before. BP was quickly controlled. Also found to have Pneumonia upon hospital admit. Started on Levaquin and patient feels better.  Review of Systems  Constitutional:?no fever, no weakness?  Respiratory:?no wheezing,?no shortness of breath  Cardiovascular:?no chest pain  Gastrointestinal:?no nausea, vomiting, or diarrhea. No abdominal pain  Psych: no anxiety, depression, suicidal ideations  Neuro: no headaches, numbness, tingling, weakness, or dizziness  ?  Physical Exam  Vitals & Measurements  T:?36.8? ?C (Temporal Artery)? HR:?68(Peripheral)? BP:?142/72?  HT:?172?cm? HT:?172?cm? WT:?104.4?kg? WT:?104.4?kg? BMI:?35.29?  General:?Well developed, well-nourished, in no acute distress  Respiratory:?clear to auscultation bilaterally  Cardiovascular:?regular rate and rhythm without murmurs, gallops or rubs  ?  Assessment/Plan  1.?Hypertension  ?Continue Coreg 6.25mg PO BID  ?Continue Hydralazine 25mg PO TID  ?Continue Lisinopril 5mg PO q day  Ordered:  Office/Outpatient Visit Level 3 Established [id number] PC, Hypertension  Pneumonia  Osteoarthritis of knee, [facility], 04/05/18 10:49:00 CDT  ?  2.?Pneumonia  ?Complete Levaquin 750 mg PO q day  Ordered:  Office/Outpatient Visit Level 3 Established [id number] PC, Hypertension  Pneumonia  Osteoarthritis of knee, [facility], 04/05/18 10:49:00 CDT  ?  3.?Osteoarthritis of knee  ?Will consider another series of Supartz  ?Patient will call when ready to proceed  Ordered:  Office/Outpatient Visit Level 3 Established [id number] PC, Hypertension  Pneumonia  Osteoarthritis of knee, [facility]  AMB - [facility], 04/05/18 10:49:00 CDT  ?  HLD (hyperlipidemia)  Ordered:  CMP, Routine collect, 04/05/18 10:51:00 CDT, Blood, Order for future visit, Stop date 04/05/18 10:51:00 CDT, Lab Collect, HLD (hyperlipidemia), 04/05/18 10:51:00 CDT  Lab Collection Request, 04/05/18 10:51:00 CDT, [facility], 04/05/18 10:51:00 CDT  Lipid Panel, Routine collect, 04/05/18 10:51:00 CDT, Blood, Order for future visit, Stop date 04/05/18 10:51:00 CDT, Lab Collect, HLD (hyperlipidemia), 04/05/18 10:51:00 CDT  ?  Orders:  Clinic Follow-up PRN, 04/05/18 10:49:00 CDT, [facility], Future Order   Problem List/Past Medical History  Ongoing  allergies  Angina  Anxiety  Chronic CHF  Chronic GERD  DDD (degenerative disc disease), lumbar  Diverticulitis  Hypercholesterolemia  Hypertension  Malaise  Osteoarthritis of knee  Prostatitis  Shortness of breath  Sleep apnea  Historical  staph infection post-op  Procedure/Surgical History  Catheter placement in coronary artery(s) for coronary angiography, including intraprocedural injection(s) for coronary angiography, imaging supervision and interpretation; with left heart catheterization including intraprocedural injection(s) for left isaac (03/09/2016), Dilation of Coronary Artery, One Site with Drug-eluting Intraluminal Device, Percutaneous Approach (03/09/2016), Fluoroscopy of Left Heart using Low Osmolar Contrast (03/09/2016), Fluoroscopy of Multiple Coronary Arteries using Low Osmolar Contrast (03/09/2016), HOSPITAL OBSERVATION SERVICE, PER HOUR (03/09/2016), Intravascular Doppler velocity and/or pressure derived coronary flow reserve measurement (coronary vessel or graft) during coronary angiography including pharmacologically induced stress; initial vessel (List separately in addition to code for primary pro (03/09/2016), Measurement of Arterial Flow, Coronary, Percutaneous Approach (03/09/2016), Measurement of Arterial Pressure, Coronary, Percutaneous Approach (03/09/2016), Measurement  of Cardiac Sampling and Pressure, Left Heart, Percutaneous Approach (03/09/2016), Percutaneous transcatheter placement of drug eluting intracoronary stent(s), with coronary angioplasty when performed; single major coronary artery or branch (03/09/2016), Dilation of Coronary Artery, One Site with Drug-eluting Intraluminal Device, Percutaneous Approach (11/19/2015), Fluoroscopy of Left Heart using Low Osmolar Contrast (11/19/2015), Fluoroscopy of Multiple Coronary Arteries using Low Osmolar Contrast (11/19/2015), Left Heart Cath w/COR Angio Ventriculography (11/19/2015), Measurement of Cardiac Sampling and Pressure, Left Heart, Percutaneous Approach (11/19/2015), Place Drug Eluting Stent Perc Single (11/19/2015), Angiocardiography of left heart structures (07/23/2014), Catheter placement in coronary artery(s) for coronary angiography, including intraprocedural injection(s) for coronary angiography, imaging supervision and interpretation; with left heart catheterization including intraprocedural i. (07/23/2014), Coronary arteriography using two catheters (07/23/2014), INSERTION OF NON-DRUG-ELUTING CORONARY ARTERY STENT(S) (07/23/2014), Insertion of one vascular stent (07/23/2014), Intracoronary Stent Placement 1st Vessel (None) (07/23/2014), Left heart cardiac catheterization (07/23/2014), Left Heart Cath w/COR Angio Ventriculogr (None) (07/23/2014), Percutaneous transcatheter placement of intracoronary stent(s), with coronary angioplasty when performed; single major coronary artery or branch. (07/23/2014), Percutaneous Transluminal Coronary Angioplasty [PTCA] (07/23/2014), Procedure on single vessel (07/23/2014), STENT, NON-COATED/NON-COVERED, WITH DELIVERY SYSTEM (07/23/2014), Angiocardiography of left heart structures (04/22/2014), Catheter placement in coronary artery(s) for coronary angiography, including intraprocedural injection(s) for coronary angiography, imaging supervision and interpretation; with left heart  catheterization including intraprocedural i. (04/22/2014), Coronary arteriography using a single catheter (04/22/2014), INSERTION OF DRUG-ELUTING CORONARY ARTERY STENT(S) (04/22/2014), INSERTION OF NON-DRUG-ELUTING CORONARY ARTERY STENT(S) (04/22/2014), Insertion of three vascular stents (04/22/2014), Intracoronary Stent Placement 1st Vessel (04/22/2014), Left heart cardiac catheterization (04/22/2014), Left Heart Cath w/COR Angio Ventriculogr (04/22/2014), Percutaneous transcatheter placement of drug eluting intracoronary stent(s), with coronary angioplasty when performed; single major coronary artery or branch (04/22/2014), Percutaneous transcatheter placement of intracoronary stent(s), with coronary angioplasty when performed; single major coronary artery or branch. (04/22/2014), Percutaneous Transluminal Coronary Angioplasty [PTCA] (04/22/2014), Place Drug Eluting Stent Perc Single (04/22/2014), Procedure on two vessels (04/22/2014), back surgery x 2, bilateral hip replacement, left TKR, left wrist ganglion cyst removed, neck surgery, penile implant, right inguinal hernia repair, right rotator cuff repair, TURP - Transurethral resection of prostate.  Medications  Bentyl 10 mg oral capsule, 10 mg= 1 cap(s), Oral, TID  BRILINTA 60MG TABLETS, 60 mg= 1 tab(s), Oral, BID  CARVEDILOL 6.25MG TABLETS, 6.25 mg= 1 tab(s), Oral, BID  citalopram 10 mg oral tablet, 10 mg, Oral, Daily  CITALOPRAM 20MG TABLETS, 20 mg= 1 tab(s), Oral, Daily  Crestor 20 mg oral tablet, 20 mg= 1 tab(s), Oral, Daily  DEXILANT 60 MG CPDR, 60 mg= 1 cap(s), Oral, Daily  DIAZEPAM 10 MG TABS, 10 mg= 1 tab(s), Oral, q8hr  fenofibrate 200 mg oral capsule, 200 mg= 1 cap(s), Oral, Daily  hydrALAZINE 25 mg oral tablet, 25 mg= 1 tab(s), Oral, TID, 1 refills  HYDROCO/APAP TAB 10-325MG, 1 tab(s), Oral, QID  Imodium A-D 2 mg oral tablet, 2 mg= 1 tab(s), Oral, q3hr, PRN  LEVOFLOXACIN 750 MG TABS, 750 mg= 1 tab(s), Oral, Daily  LISINOPRIL 5 MG TABS, 5 mg= 1  tab(s), Oral, Daily  NITROSTAT .4 MG SUBL, 0.4 mg= 1 tab(s), SL  promethazine 25 mg Tab, 25 mg= 1 tab(s), Oral, q4hr, PRN  YOSPRALA TAB 81-40MG, 1 tab(s), Oral, Daily  Allergies  atorvastatin?(body aches)  metoprolol?(body aches)  Social History  Alcohol - Denies Alcohol Use, 04/17/2014  Current, Beer, 1-2 times per year, 03/13/2018  Employment/School  Retired, 03/13/2018  Home/Environment  Lives with Spouse., 03/13/2018  Nutrition/Health  Regular, 03/13/2018  Substance Abuse - Denies Substance Abuse, 04/17/2014  Never, 03/13/2018  Tobacco - Denies Tobacco Use, 04/17/2014  Never smoker, 03/29/2016  Family History  Acute myocardial infarction.: Sister.  CAD - Coronary artery disease: Sister.  Cancer: Brother.  Congestive heart disease.: Mother.  Hypertension.: Mother and Father.  Immunizations  Vaccine Date Status Comments   tetanus/diphtheria/pertussis, acel(Tdap) 10/31/2017 Given    influenza virus vaccine, inactivated 09/07/2017 Recorded    pneumococcal 23-polyvalent vaccine 05/11/2014 Given Nursing Judgment

## 2022-05-24 DIAGNOSIS — R06.02 SHORTNESS OF BREATH: Primary | ICD-10-CM

## 2022-05-31 ENCOUNTER — PROCEDURE VISIT (OUTPATIENT)
Dept: RESPIRATORY THERAPY | Facility: HOSPITAL | Age: 77
End: 2022-05-31
Attending: FAMILY MEDICINE
Payer: MEDICARE

## 2022-05-31 VITALS — OXYGEN SATURATION: 96 % | RESPIRATION RATE: 18 BRPM | HEART RATE: 55 BPM

## 2022-05-31 DIAGNOSIS — R06.02 SHORTNESS OF BREATH: ICD-10-CM

## 2022-05-31 LAB
DLCO ADJ PRE: 12.78 ML/(MIN*MMHG) (ref 15.63–29.48)
DLCO SINGLE BREATH LLN: 15.63
DLCO SINGLE BREATH PRE REF: 56.7 %
DLCO SINGLE BREATH REF: 22.55
DLCOC SBVA LLN: 2.28
DLCOC SBVA PRE REF: 117.6 %
DLCOC SBVA REF: 3.56
DLCOC SINGLE BREATH LLN: 15.63
DLCOC SINGLE BREATH PRE REF: 56.7 %
DLCOC SINGLE BREATH REF: 22.55
DLCOCSBVAULN: 4.83
DLCOCSINGLEBREATHULN: 29.48
DLCOSINGLEBREATHULN: 29.48
DLCOVA LLN: 2.28
DLCOVA PRE REF: 117.6 %
DLCOVA PRE: 4.18 ML/(MIN*MMHG*L) (ref 2.28–4.83)
DLCOVA REF: 3.56
DLCOVAULN: 4.83
DLVAADJ PRE: 4.18 ML/(MIN*MMHG*L) (ref 2.28–4.83)
ERV LLN: -16449.13
ERV PRE REF: 50.6 %
ERV REF: 0.87
ERVULN: ABNORMAL
FEF 25 75 LLN: 0.75
FEF 25 75 PRE REF: 47.9 %
FEF 25 75 REF: 1.92
FET100 CHG: 5.7 %
FEV05 LLN: 1.03
FEV05 REF: 2.16
FEV1 CHG: 13 %
FEV1 FVC LLN: 61
FEV1 FVC PRE REF: 96.7 %
FEV1 FVC REF: 76
FEV1 LLN: 1.82
FEV1 PRE REF: 51.3 %
FEV1 REF: 2.6
FEV1 VOL CHG: 0.17
FRCPLETH LLN: 2.55
FRCPLETH PREREF: 62.5 %
FRCPLETH REF: 3.53
FRCPLETHULN: 4.52
FVC CHG: 1.4 %
FVC LLN: 2.51
FVC PRE REF: 52.7 %
FVC REF: 3.46
FVC VOL CHG: 0.03
IVC PRE: 1.71 L (ref 2.51–4.41)
IVC SINGLE BREATH LLN: 2.51
IVC SINGLE BREATH PRE REF: 49.4 %
IVC SINGLE BREATH REF: 3.46
IVCSINGLEBREATHULN: 4.41
LLN IC: -9999997.43
MVV LLN: 85
MVV PRE REF: 67 %
MVV REF: 100
PEF LLN: 4.69
PEF PRE REF: 85.9 %
PEF REF: 6.75
POST FEF 25 75: 1.44 L/S (ref 0.75–3.09)
POST FET 100: 6.23 SEC
POST FEV1 FVC: 81.56 % (ref 61.04–90.2)
POST FEV1: 1.51 L (ref 1.82–3.38)
POST FEV5: 1.22 L (ref 1.03–3.3)
POST FVC: 1.85 L (ref 2.51–4.41)
POST PEF: 5.4 L/S (ref 4.69–8.82)
PRE DLCO: 12.78 ML/(MIN*MMHG) (ref 15.63–29.48)
PRE ERV: 0.44 L (ref -16449.13–16450.87)
PRE FEF 25 75: 0.92 L/S (ref 0.75–3.09)
PRE FET 100: 5.9 SEC
PRE FEV05 REF: 51.9 %
PRE FEV1 FVC: 73.14 % (ref 61.04–90.2)
PRE FEV1: 1.33 L (ref 1.82–3.38)
PRE FEV5: 1.12 L (ref 1.03–3.3)
PRE FRC PL: 2.21 L
PRE FVC: 1.82 L (ref 2.51–4.41)
PRE IC: 1.53 L (ref -9999997.43–#######.####)
PRE MVV: 67 L/MIN (ref 85.03–115.05)
PRE PEF: 5.8 L/S (ref 4.69–8.82)
PRE REF IC: 59.5 %
PRE RV: 1.74 L (ref 1.99–3.34)
PRE TLC: 3.74 L (ref 5.19–7.49)
PRE VTG: 2.43 L
RAW PRE REF: 80.6 %
RAW PRE: 2.47 CMH2O*S/L (ref 3.06–3.06)
RAW REF: 3.06
REF IC: 2.57
RV LLN: 1.99
RV PRE REF: 65.2 %
RV REF: 2.66
RVTLC LLN: 35
RVTLC PRE REF: 105.7 %
RVTLC PRE: 46.48 % (ref 35.01–52.97)
RVTLC REF: 44
RVTLCULN: 53
RVULN: 3.34
SGAW PRE REF: 220.3 %
SGAW PRE: 0.18 1/(CMH2O*S) (ref 0.08–0.08)
SGAW REF: 0.08
TLC LLN: 5.19
TLC PRE REF: 58.9 %
TLC REF: 6.34
TLC ULN: 7.49
ULN IC: ABNORMAL
VA PRE: 3.06 L (ref 6.19–6.19)
VA SINGLE BREATH PRE REF: 49.4 %
VA SINGLE BREATH REF: 6.19
VC LLN: 2.51
VC PRE REF: 57.8 %
VC PRE: 2 L (ref 2.51–4.41)
VC REF: 3.46
VC ULN: 4.41

## 2022-05-31 PROCEDURE — 25000242 PHARM REV CODE 250 ALT 637 W/ HCPCS: Performed by: FAMILY MEDICINE

## 2022-05-31 PROCEDURE — 94729 DIFFUSING CAPACITY: CPT

## 2022-05-31 PROCEDURE — 99900035 HC TECH TIME PER 15 MIN (STAT)

## 2022-05-31 PROCEDURE — 94060 EVALUATION OF WHEEZING: CPT

## 2022-05-31 PROCEDURE — 94727 GAS DIL/WSHOT DETER LNG VOL: CPT

## 2022-05-31 PROCEDURE — 99900031 HC PATIENT EDUCATION (STAT)

## 2022-05-31 PROCEDURE — 94761 N-INVAS EAR/PLS OXIMETRY MLT: CPT

## 2022-05-31 RX ORDER — ALBUTEROL SULFATE 0.83 MG/ML
2.5 SOLUTION RESPIRATORY (INHALATION)
Status: COMPLETED | OUTPATIENT
Start: 2022-05-31 | End: 2022-05-31

## 2022-05-31 RX ADMIN — ALBUTEROL SULFATE 2.5 MG: 2.5 SOLUTION RESPIRATORY (INHALATION) at 10:05

## 2022-08-11 PROBLEM — I25.10 CORONARY ARTERIOSCLEROSIS: Status: ACTIVE | Noted: 2018-04-02

## 2022-08-11 PROBLEM — K21.9 GASTROESOPHAGEAL REFLUX DISEASE: Status: ACTIVE | Noted: 2022-08-11

## 2022-08-11 PROBLEM — I50.9 CHRONIC CONGESTIVE HEART FAILURE: Status: ACTIVE | Noted: 2022-08-11

## 2022-08-11 PROBLEM — E78.5 HYPERLIPIDEMIA: Status: ACTIVE | Noted: 2018-04-02

## 2022-08-11 PROBLEM — M51.369 DEGENERATION OF INTERVERTEBRAL DISC OF LUMBAR REGION: Status: ACTIVE | Noted: 2022-08-11

## 2022-08-11 PROBLEM — M17.11 OSTEOARTHRITIS OF RIGHT KNEE: Status: ACTIVE | Noted: 2022-08-11

## 2022-08-11 PROBLEM — M51.36 DEGENERATION OF INTERVERTEBRAL DISC OF LUMBAR REGION: Status: ACTIVE | Noted: 2022-08-11

## 2022-08-11 PROBLEM — F32.A DEPRESSIVE DISORDER: Status: ACTIVE | Noted: 2018-04-02

## 2022-08-11 PROBLEM — G47.30 SLEEP APNEA: Status: ACTIVE | Noted: 2022-08-11

## 2022-08-11 PROBLEM — I10 HYPERTENSION: Status: ACTIVE | Noted: 2018-04-02

## 2022-09-16 DIAGNOSIS — R10.13 ABDOMINAL PAIN, EPIGASTRIC: Primary | ICD-10-CM

## 2022-09-16 DIAGNOSIS — R10.11 RUQ PAIN: ICD-10-CM

## 2022-09-19 ENCOUNTER — HOSPITAL ENCOUNTER (OUTPATIENT)
Dept: RADIOLOGY | Facility: HOSPITAL | Age: 77
Discharge: HOME | End: 2022-09-19
Attending: INTERNAL MEDICINE
Payer: MEDICARE

## 2022-09-19 DIAGNOSIS — R06.02 SHORTNESS OF BREATH: ICD-10-CM

## 2022-09-19 PROCEDURE — 71250 CT THORAX DX C-: CPT | Mod: TC

## 2022-09-20 ENCOUNTER — HOSPITAL ENCOUNTER (OUTPATIENT)
Dept: RADIOLOGY | Facility: HOSPITAL | Age: 77
Discharge: HOME | End: 2022-09-20
Attending: INTERNAL MEDICINE
Payer: MEDICARE

## 2022-09-20 DIAGNOSIS — R10.11 RUQ PAIN: ICD-10-CM

## 2022-09-20 DIAGNOSIS — R10.13 ABDOMINAL PAIN, EPIGASTRIC: ICD-10-CM

## 2022-09-20 PROCEDURE — 78226 HEPATOBILIARY SYSTEM IMAGING: CPT | Mod: TC

## 2022-09-20 PROCEDURE — A9537 TC99M MEBROFENIN: HCPCS

## 2022-09-27 PROBLEM — E66.9 OBESITY (BMI 35.0-39.9 WITHOUT COMORBIDITY): Status: ACTIVE | Noted: 2022-09-27

## 2022-09-27 PROBLEM — Z87.891 HISTORY OF TOBACCO USE: Status: ACTIVE | Noted: 2021-02-24

## 2022-09-27 PROCEDURE — 80074 ACUTE HEPATITIS PANEL: CPT | Performed by: NURSE PRACTITIONER

## 2022-11-03 ENCOUNTER — LAB VISIT (OUTPATIENT)
Dept: LAB | Facility: HOSPITAL | Age: 77
End: 2022-11-03
Attending: INTERNAL MEDICINE
Payer: MEDICARE

## 2022-11-03 DIAGNOSIS — Z94.0 KIDNEY REPLACED BY TRANSPLANT: ICD-10-CM

## 2022-11-03 DIAGNOSIS — E87.8 DILATED CARDIOMYOPATHY SECONDARY TO ELECTROLYTE DEFICIENCY: ICD-10-CM

## 2022-11-03 DIAGNOSIS — E55.9 AVITAMINOSIS D: ICD-10-CM

## 2022-11-03 DIAGNOSIS — I43 DILATED CARDIOMYOPATHY SECONDARY TO ELECTROLYTE DEFICIENCY: ICD-10-CM

## 2022-11-03 DIAGNOSIS — N18.9 ANEMIA OF CHRONIC RENAL FAILURE, UNSPECIFIED CKD STAGE: Primary | ICD-10-CM

## 2022-11-03 DIAGNOSIS — D63.1 ANEMIA OF CHRONIC RENAL FAILURE, UNSPECIFIED CKD STAGE: Primary | ICD-10-CM

## 2022-11-03 DIAGNOSIS — I12.9 PARENCHYMAL RENAL HYPERTENSION: ICD-10-CM

## 2022-11-03 DIAGNOSIS — N18.2 CHRONIC KIDNEY DISEASE, STAGE II (MILD): ICD-10-CM

## 2022-11-03 LAB
ALBUMIN SERPL-MCNC: 3.5 GM/DL (ref 3.4–4.8)
APPEARANCE UR: CLEAR
BACTERIA #/AREA URNS AUTO: NORMAL /HPF
BASOPHILS # BLD AUTO: 0.04 X10(3)/MCL (ref 0–0.2)
BASOPHILS NFR BLD AUTO: 0.7 %
BILIRUB UR QL STRIP.AUTO: NEGATIVE MG/DL
BUN SERPL-MCNC: 26.2 MG/DL (ref 8.4–25.7)
CALCIUM SERPL-MCNC: 9 MG/DL (ref 8.8–10)
CHLORIDE SERPL-SCNC: 109 MMOL/L (ref 98–107)
CO2 SERPL-SCNC: 26 MMOL/L (ref 23–31)
COLOR UR AUTO: NORMAL
CREAT SERPL-MCNC: 1.1 MG/DL (ref 0.73–1.18)
CREAT UR-MCNC: 128.7 MG/DL (ref 63–166)
DEPRECATED CALCIDIOL+CALCIFEROL SERPL-MC: 38.8 NG/ML (ref 30–80)
EOSINOPHIL # BLD AUTO: 0.16 X10(3)/MCL (ref 0–0.9)
EOSINOPHIL NFR BLD AUTO: 3 %
ERYTHROCYTE [DISTWIDTH] IN BLOOD BY AUTOMATED COUNT: 15.1 % (ref 11.5–17)
GFR SERPLBLD CREATININE-BSD FMLA CKD-EPI: >60 MLS/MIN/1.73/M2
GLUCOSE SERPL-MCNC: 84 MG/DL (ref 82–115)
GLUCOSE UR QL STRIP.AUTO: NEGATIVE MG/DL
HCT VFR BLD AUTO: 47.8 % (ref 42–52)
HGB BLD-MCNC: 15.5 GM/DL (ref 14–18)
IMM GRANULOCYTES # BLD AUTO: 0.02 X10(3)/MCL (ref 0–0.04)
IMM GRANULOCYTES NFR BLD AUTO: 0.4 %
KETONES UR QL STRIP.AUTO: NEGATIVE MG/DL
LEUKOCYTE ESTERASE UR QL STRIP.AUTO: NEGATIVE UNIT/L
LYMPHOCYTES # BLD AUTO: 1.87 X10(3)/MCL (ref 0.6–4.6)
LYMPHOCYTES NFR BLD AUTO: 35 %
MAGNESIUM SERPL-MCNC: 1.9 MG/DL (ref 1.6–2.6)
MCH RBC QN AUTO: 28.1 PG (ref 27–31)
MCHC RBC AUTO-ENTMCNC: 32.4 MG/DL (ref 33–36)
MCV RBC AUTO: 86.8 FL (ref 80–94)
MONOCYTES # BLD AUTO: 0.53 X10(3)/MCL (ref 0.1–1.3)
MONOCYTES NFR BLD AUTO: 9.9 %
NEUTROPHILS # BLD AUTO: 2.7 X10(3)/MCL (ref 2.1–9.2)
NEUTROPHILS NFR BLD AUTO: 51 %
NITRITE UR QL STRIP.AUTO: NEGATIVE
NRBC BLD AUTO-RTO: 0 %
PH UR STRIP.AUTO: 5 [PH]
PHOSPHATE SERPL-MCNC: 3.4 MG/DL (ref 2.3–4.7)
PLATELET # BLD AUTO: 155 X10(3)/MCL (ref 130–400)
PMV BLD AUTO: 10.1 FL (ref 7.4–10.4)
POTASSIUM SERPL-SCNC: 3.9 MMOL/L (ref 3.5–5.1)
PROT UR QL STRIP.AUTO: NEGATIVE MG/DL
PROT UR STRIP-MCNC: 12.4 MG/DL
PROT/CREAT UR-RTO: 96.3 MG/GM CR
PTH-INTACT SERPL-MCNC: 28.5 PG/ML (ref 8.7–77)
RBC # BLD AUTO: 5.51 X10(6)/MCL (ref 4.7–6.1)
RBC #/AREA URNS AUTO: <5 /HPF
RBC UR QL AUTO: NEGATIVE UNIT/L
SODIUM SERPL-SCNC: 142 MMOL/L (ref 136–145)
SP GR UR STRIP.AUTO: 1.02 (ref 1–1.03)
SQUAMOUS #/AREA URNS AUTO: <5 /HPF
UROBILINOGEN UR STRIP-ACNC: 1 MG/DL
WBC # SPEC AUTO: 5.3 X10(3)/MCL (ref 4.5–11.5)
WBC #/AREA URNS AUTO: <5 /HPF

## 2022-11-03 PROCEDURE — 82570 ASSAY OF URINE CREATININE: CPT

## 2022-11-03 PROCEDURE — 36415 COLL VENOUS BLD VENIPUNCTURE: CPT

## 2022-11-03 PROCEDURE — 83735 ASSAY OF MAGNESIUM: CPT

## 2022-11-03 PROCEDURE — 80069 RENAL FUNCTION PANEL: CPT

## 2022-11-03 PROCEDURE — 81001 URINALYSIS AUTO W/SCOPE: CPT

## 2022-11-03 PROCEDURE — 82306 VITAMIN D 25 HYDROXY: CPT

## 2022-11-03 PROCEDURE — 85025 COMPLETE CBC W/AUTO DIFF WBC: CPT

## 2022-11-03 PROCEDURE — 83970 ASSAY OF PARATHORMONE: CPT

## 2022-11-10 ENCOUNTER — HOSPITAL ENCOUNTER (EMERGENCY)
Facility: HOSPITAL | Age: 77
Discharge: HOME | End: 2022-11-10
Attending: SPECIALIST
Payer: MEDICARE

## 2022-11-10 VITALS
TEMPERATURE: 98 F | BODY MASS INDEX: 35.16 KG/M2 | RESPIRATION RATE: 18 BRPM | HEART RATE: 82 BPM | OXYGEN SATURATION: 95 % | DIASTOLIC BLOOD PRESSURE: 56 MMHG | HEIGHT: 68 IN | WEIGHT: 232 LBS | SYSTOLIC BLOOD PRESSURE: 136 MMHG

## 2022-11-10 DIAGNOSIS — N17.9 AKI (ACUTE KIDNEY INJURY): ICD-10-CM

## 2022-11-10 DIAGNOSIS — R74.8 ELEVATED LIVER ENZYMES: Primary | ICD-10-CM

## 2022-11-10 DIAGNOSIS — R10.13 EPIGASTRIC ABDOMINAL PAIN: ICD-10-CM

## 2022-11-10 LAB
ALBUMIN SERPL-MCNC: 3.6 GM/DL (ref 3.4–4.8)
ALBUMIN/GLOB SERPL: 1 RATIO (ref 1.1–2)
ALP SERPL-CCNC: 114 UNIT/L (ref 40–150)
ALT SERPL-CCNC: 168 UNIT/L (ref 0–55)
AST SERPL-CCNC: 348 UNIT/L (ref 5–34)
BASOPHILS # BLD AUTO: 0.02 X10(3)/MCL (ref 0–0.2)
BASOPHILS NFR BLD AUTO: 0.2 %
BILIRUBIN DIRECT+TOT PNL SERPL-MCNC: 1.7 MG/DL
BUN SERPL-MCNC: 23.8 MG/DL (ref 8.4–25.7)
CALCIUM SERPL-MCNC: 9.2 MG/DL (ref 8.8–10)
CHLORIDE SERPL-SCNC: 109 MMOL/L (ref 98–107)
CO2 SERPL-SCNC: 25 MMOL/L (ref 23–31)
CREAT SERPL-MCNC: 1.52 MG/DL (ref 0.73–1.18)
EOSINOPHIL # BLD AUTO: 0.06 X10(3)/MCL (ref 0–0.9)
EOSINOPHIL NFR BLD AUTO: 0.6 %
ERYTHROCYTE [DISTWIDTH] IN BLOOD BY AUTOMATED COUNT: 15 % (ref 11.5–17)
ETHANOL SERPL-MCNC: <10 MG/DL
GFR SERPLBLD CREATININE-BSD FMLA CKD-EPI: 47 MLS/MIN/1.73/M2
GLOBULIN SER-MCNC: 3.5 GM/DL (ref 2.4–3.5)
GLUCOSE SERPL-MCNC: 121 MG/DL (ref 82–115)
HCT VFR BLD AUTO: 53 % (ref 42–52)
HGB BLD-MCNC: 16.1 GM/DL (ref 14–18)
IMM GRANULOCYTES # BLD AUTO: 0.03 X10(3)/MCL (ref 0–0.04)
IMM GRANULOCYTES NFR BLD AUTO: 0.3 %
LACTATE SERPL-SCNC: 1.7 MMOL/L (ref 0.5–2.2)
LIPASE SERPL-CCNC: 31 U/L
LYMPHOCYTES # BLD AUTO: 1.19 X10(3)/MCL (ref 0.6–4.6)
LYMPHOCYTES NFR BLD AUTO: 11.4 %
MCH RBC QN AUTO: 27.1 PG (ref 27–31)
MCHC RBC AUTO-ENTMCNC: 30.4 MG/DL (ref 33–36)
MCV RBC AUTO: 89.2 FL (ref 80–94)
MONOCYTES # BLD AUTO: 0.25 X10(3)/MCL (ref 0.1–1.3)
MONOCYTES NFR BLD AUTO: 2.4 %
NEUTROPHILS # BLD AUTO: 8.9 X10(3)/MCL (ref 2.1–9.2)
NEUTROPHILS NFR BLD AUTO: 85.1 %
PLATELET # BLD AUTO: 150 X10(3)/MCL (ref 130–400)
PMV BLD AUTO: 9.6 FL (ref 7.4–10.4)
POTASSIUM SERPL-SCNC: 4 MMOL/L (ref 3.5–5.1)
PROT SERPL-MCNC: 7.1 GM/DL (ref 5.8–7.6)
RBC # BLD AUTO: 5.94 X10(6)/MCL (ref 4.7–6.1)
SODIUM SERPL-SCNC: 145 MMOL/L (ref 136–145)
WBC # SPEC AUTO: 10.4 X10(3)/MCL (ref 4.5–11.5)

## 2022-11-10 PROCEDURE — 83690 ASSAY OF LIPASE: CPT | Performed by: SPECIALIST

## 2022-11-10 PROCEDURE — 25500020 PHARM REV CODE 255: Performed by: SPECIALIST

## 2022-11-10 PROCEDURE — 80053 COMPREHEN METABOLIC PANEL: CPT | Performed by: SPECIALIST

## 2022-11-10 PROCEDURE — 85025 COMPLETE CBC W/AUTO DIFF WBC: CPT | Performed by: SPECIALIST

## 2022-11-10 PROCEDURE — 96361 HYDRATE IV INFUSION ADD-ON: CPT

## 2022-11-10 PROCEDURE — 87077 CULTURE AEROBIC IDENTIFY: CPT | Performed by: SPECIALIST

## 2022-11-10 PROCEDURE — 96374 THER/PROPH/DIAG INJ IV PUSH: CPT | Mod: 59

## 2022-11-10 PROCEDURE — 63600175 PHARM REV CODE 636 W HCPCS: Performed by: SPECIALIST

## 2022-11-10 PROCEDURE — 25000003 PHARM REV CODE 250: Performed by: SPECIALIST

## 2022-11-10 PROCEDURE — 96375 TX/PRO/DX INJ NEW DRUG ADDON: CPT

## 2022-11-10 PROCEDURE — 82077 ASSAY SPEC XCP UR&BREATH IA: CPT | Performed by: SPECIALIST

## 2022-11-10 PROCEDURE — 83605 ASSAY OF LACTIC ACID: CPT | Performed by: SPECIALIST

## 2022-11-10 PROCEDURE — 25000003 PHARM REV CODE 250: Performed by: NURSE PRACTITIONER

## 2022-11-10 PROCEDURE — 99285 EMERGENCY DEPT VISIT HI MDM: CPT | Mod: 25

## 2022-11-10 RX ORDER — LIDOCAINE HYDROCHLORIDE 20 MG/ML
15 SOLUTION OROPHARYNGEAL
Status: COMPLETED | OUTPATIENT
Start: 2022-11-10 | End: 2022-11-10

## 2022-11-10 RX ORDER — ONDANSETRON 2 MG/ML
4 INJECTION INTRAMUSCULAR; INTRAVENOUS
Status: COMPLETED | OUTPATIENT
Start: 2022-11-10 | End: 2022-11-10

## 2022-11-10 RX ORDER — HYDROMORPHONE HYDROCHLORIDE 2 MG/ML
1 INJECTION, SOLUTION INTRAMUSCULAR; INTRAVENOUS; SUBCUTANEOUS
Status: COMPLETED | OUTPATIENT
Start: 2022-11-10 | End: 2022-11-10

## 2022-11-10 RX ORDER — MAG HYDROX/ALUMINUM HYD/SIMETH 200-200-20
30 SUSPENSION, ORAL (FINAL DOSE FORM) ORAL
Status: COMPLETED | OUTPATIENT
Start: 2022-11-10 | End: 2022-11-10

## 2022-11-10 RX ORDER — SODIUM CHLORIDE 9 MG/ML
1000 INJECTION, SOLUTION INTRAVENOUS
Status: COMPLETED | OUTPATIENT
Start: 2022-11-10 | End: 2022-11-10

## 2022-11-10 RX ORDER — HYOSCYAMINE SULFATE 0.12 MG/1
0.12 TABLET SUBLINGUAL
Status: COMPLETED | OUTPATIENT
Start: 2022-11-10 | End: 2022-11-10

## 2022-11-10 RX ORDER — MORPHINE SULFATE 4 MG/ML
4 INJECTION, SOLUTION INTRAMUSCULAR; INTRAVENOUS
Status: COMPLETED | OUTPATIENT
Start: 2022-11-10 | End: 2022-11-10

## 2022-11-10 RX ADMIN — IOPAMIDOL 100 ML: 755 INJECTION, SOLUTION INTRAVENOUS at 08:11

## 2022-11-10 RX ADMIN — ALUMINUM HYDROXIDE, MAGNESIUM HYDROXIDE, AND SIMETHICONE 30 ML: 200; 200; 20 SUSPENSION ORAL at 07:11

## 2022-11-10 RX ADMIN — MORPHINE SULFATE 4 MG: 4 INJECTION INTRAVENOUS at 08:11

## 2022-11-10 RX ADMIN — HYOSCYAMINE SULFATE 0.12 MG: 0.12 TABLET ORAL at 07:11

## 2022-11-10 RX ADMIN — ONDANSETRON 4 MG: 2 INJECTION INTRAMUSCULAR; INTRAVENOUS at 08:11

## 2022-11-10 RX ADMIN — SODIUM CHLORIDE 1000 ML: 9 INJECTION, SOLUTION INTRAVENOUS at 08:11

## 2022-11-10 RX ADMIN — HYDROMORPHONE HYDROCHLORIDE 1 MG: 2 INJECTION INTRAMUSCULAR; INTRAVENOUS; SUBCUTANEOUS at 08:11

## 2022-11-10 RX ADMIN — LIDOCAINE HYDROCHLORIDE 15 ML: 20 SOLUTION ORAL; TOPICAL at 07:11

## 2022-11-11 NOTE — ED PROVIDER NOTES
Encounter Date: 11/10/2022       History     Chief Complaint   Patient presents with    Abdominal Pain     Pt reports gallbladder issues over the last few months (sees Dr Mcghee), has appointment to see surgeon to have gallbladder removed but the pain is unbearable at this time.      Patient presents with epigastric discomfort which seems to radiate to the right upper quadrant; notes symptoms have been present off and on for months but notes the symptoms worsened today after eating lunch; slight nausea no emesis; no fever or diarrhea; patient states he has the chills and is presently shaking; patient is here with his daughter    The history is provided by the patient and a relative.   Abdominal Pain  The current episode started 3 to 5 hours ago. The problem has not changed since onset.The abdominal pain is located in the epigastric region and RUQ. The severity of the abdominal pain is 10/10. The abdominal pain is relieved by nothing. And on for months but worsened today after lunch;  Review of patient's allergies indicates:   Allergen Reactions    Amlodipine      Other reaction(s): Propensity to adverse reactions to drug    Atorvastatin      Other reaction(s): body aches    Metoprolol      Other reaction(s): body aches    Morphine      Other reaction(s): Hallucinations    Ranitidine      Other reaction(s): Propensity to adverse reactions to drug    Grass pollen-june grass standard Rash     No past medical history on file.  Past Surgical History:   Procedure Laterality Date    ANGIOGRAPHY      BACK SURGERY      CAROTID STENT      COLONOSCOPY  06/01/2016    FLUOROSCOPY      GANGLION CYST EXCISION      HERNIA REPAIR      NECK SURGERY      PENILE PROSTHESIS IMPLANT      PROSTATECTOMY      ROTATOR CUFF REPAIR      TOTAL HIP ARTHROPLASTY      TOTAL KNEE ARTHROPLASTY  12/17/2018    TOTAL KNEE ARTHROPLASTY Left     TRANSURETHRAL RESECTION OF PROSTATE       Family History   Problem Relation Age of Onset    Hyperlipidemia  Mother     Heart failure Mother     Hyperlipidemia Father     Heart attack Sister     Coronary artery disease Sister     Cancer Brother      Social History     Tobacco Use    Smoking status: Never    Smokeless tobacco: Never     Review of Systems   Constitutional: Negative.    HENT: Negative.     Respiratory: Negative.     Cardiovascular: Negative.    Gastrointestinal:  Positive for abdominal pain.   Genitourinary: Negative.    Musculoskeletal:  Positive for arthralgias.   Neurological: Negative.      Physical Exam     Initial Vitals [11/10/22 1906]   BP Pulse Resp Temp SpO2   (!) 151/74 64 18 97.9 °F (36.6 °C) 95 %      MAP       --         Physical Exam    Nursing note and vitals reviewed.  Constitutional: He appears well-developed and well-nourished.   HENT:   Head: Normocephalic and atraumatic.   Eyes: EOM are normal. Pupils are equal, round, and reactive to light.   Neck: Neck supple.   Normal range of motion.  Cardiovascular:  Normal rate, regular rhythm and normal heart sounds.           Pulmonary/Chest: Breath sounds normal.   Abdominal: Abdomen is soft. Bowel sounds are normal. He exhibits distension (Mild). There is abdominal tenderness (moderate, epigastric).   Protuberant There is no rebound and no guarding.   Musculoskeletal:         General: Normal range of motion.      Cervical back: Normal range of motion and neck supple.     Neurological: He is alert and oriented to person, place, and time.   Skin: Skin is warm and dry.       ED Course   Procedures  Labs Reviewed   COMPREHENSIVE METABOLIC PANEL - Abnormal; Notable for the following components:       Result Value    Chloride 109 (*)     Glucose Level 121 (*)     Creatinine 1.52 (*)     Albumin/Globulin Ratio 1.0 (*)     Bilirubin Total 1.7 (*)     Alanine Aminotransferase 168 (*)     Aspartate Aminotransferase 348 (*)     All other components within normal limits   CBC WITH DIFFERENTIAL - Abnormal; Notable for the following components:    Hct 53.0  (*)     MCHC 30.4 (*)     All other components within normal limits   LIPASE - Normal   LACTIC ACID, PLASMA - Normal   ALCOHOL,MEDICAL (ETHANOL) - Normal   BLOOD CULTURE OLG   BLOOD CULTURE OLG   CBC W/ AUTO DIFFERENTIAL    Narrative:     The following orders were created for panel order CBC auto differential.  Procedure                               Abnormality         Status                     ---------                               -----------         ------                     CBC with Differential[[id number]]        Abnormal            Final result                 Please view results for these tests on the individual orders.   URINALYSIS, REFLEX TO URINE CULTURE          Imaging Results              CT Abdomen Pelvis With Contrast (Final result)  Result time 11/10/22 21:26:03      Final result by Patrick Mccall MD (11/10/22 21:26:03)                   Narrative:    EXAMINATION  CT ABDOMEN PELVIS WITH CONTRAST    CLINICAL HISTORY  Peritonitis or perforation suspected;    TECHNIQUE  Post-contrast helical-acquisition CT images were obtained and multiplanar reformats accomplished by a CT technologist at a separate workstation, pushed to PACS for physician review.    CONTRAST  *IV: ISOVUE-370, 100 mL  *Enteric: none    COMPARISON  21 April 2021    FINDINGS  Images were reviewed in soft tissue, lung, and bone windows.    Exam quality: Markedly limited, near nondiagnostic assessment secondary to patient movement throughout image acquisition with resulting artifact.    Lines/tubes: Components of penile prosthesis are again noted, similar appearance.    Chronic alterations of the bilateral visualized lung parenchyma are similar in comparison, with no convincing acute abnormality through the lung bases.  There is no significant pericardial or pleural fluid.  Cardiac chambers and mediastinal vascular structures are without acute process or abnormal dilatation.  Aortoiliac mural calcification is similar to the  prior.    The gallbladder and bile ducts, liver, pancreas, and spleen are without acute abnormality.  Portal vein is widely patent.  There is no suspicious focal lesion of the adrenal glands or kidneys.  No evidence of distal obstructive uropathy.  The urinary bladder is poorly visualized secondary to extensive streak artifact produced by bilateral hip arthroplasty hardware, but the adequately assessed portions are without convincing focal abnormality.    The included esophagus and the stomach are unremarkable.  There are no abnormally dilated loops of bowel or discrete transition point to suggest high-grade mechanical obstruction.  No convincing focal bowel lesion is evident.  As before, there is scattered sigmoid diverticulosis; no changes of acute diverticulitis are identified.  There is no free intra-abdominal air or fluid.  No drainable collections.    No pathologic lymph node enlargement or necrotic adenopathy.    Degenerative alterations of the spinal column and bony pelvis are similar to the prior study.  No acute or destructive osseous process is visualized.  The body wall subcutaneous tissues and regional muscular structures are without acute or focal finding.  Uncomplicated fat containing umbilical and bilateral inguinal hernias are similar.    IMPRESSION  Exam quality is near nondiagnostic secondary to patient movement throughout image acquisition.  Within limitations:    1. No convincing CT evidence of acute abdominopelvic abnormality.  2. Chronic secondary details are without significant interval change, as discussed above.    RADIATION DOSE  Automated tube current modulation, weight-based exposure dosing, and/or iterative reconstruction technique utilized to reach lowest reasonably achievable exposure rate.    DLP: 1449.3 mGy*cm      Electronically signed by: Patrick Mccall  Date:    11/10/2022  Time:    21:26                                     Medications   LIDOcaine HCl 2% oral solution 15 mL (15 mLs  "Oral Given 11/10/22 1922)   hyoscyamine ODT 0.125 mg (0.125 mg Oral Given 11/10/22 1922)   aluminum-magnesium hydroxide-simethicone 200-200-20 mg/5 mL suspension 30 mL (30 mLs Oral Given 11/10/22 1922)   ondansetron injection 4 mg (4 mg Intravenous Given 11/10/22 2024)   morphine injection 4 mg (4 mg Intravenous Given 11/10/22 2024)   0.9%  NaCl infusion (0 mLs Intravenous Stopped 11/10/22 2207)   HYDROmorphone (PF) injection 1 mg (1 mg Intravenous Given 11/10/22 2049)   iopamidoL (ISOVUE-370) injection 100 mL (100 mLs Intravenous Given 11/10/22 2038)                       Patient Vitals for the past 24 hrs:   BP Temp Temp src Pulse Resp SpO2 Height Weight   11/10/22 2148 (!) 136/56 -- -- 82 -- 95 % -- --   11/10/22 2101 (!) 157/60 -- -- 88 -- (!) 92 % -- --   11/10/22 2052 136/69 -- -- 91 18 (!) 94 % -- --   11/10/22 2049 -- -- -- -- 18 -- -- --   11/10/22 2024 -- -- -- -- (!) 24 -- -- --   11/10/22 2008 -- -- -- 79 -- (!) 85 % -- --   11/10/22 1906 (!) 151/74 97.9 °F (36.6 °C) Oral 64 18 95 % 5' 8" (1.727 m) 105.2 kg (232 lb)   Patient's pain is managed and he agrees to contact his gastroenterologist or primary care physician in the morning regarding his elevated liver enzymes; he will also stop his Crestor      The patient is resting comfortably and in no acute distress.   He states that his symptoms have improved after treatment in Emergency Department. I personally discussed his test results and treatment plan.  Gave strict ED precautions.  Specific conditions for return to the emergency department and importance of follow up with his primary care provided or the physician listed on the discharge instructions.  Patient voices understanding and agrees to the plan discussed. All patients' questions have been answered at this time.   He has remained hemodynamically stable throughout entire stay in ED and is stable for discharge home.     Clinical Impression:   Final diagnoses:  [R74.8] Elevated liver enzymes " (Primary)  [R10.13] Epigastric abdominal pain  [N17.9] DAVID (acute kidney injury)        ED Disposition Condition    Discharge Stable          ED Prescriptions    None       Follow-up Information       Follow up With Specialties Details Why Contact Info    Gastroenterologist  In 1 day               Kvng Del Cid MD  11/11/22 0037

## 2022-11-11 NOTE — ED PROVIDER NOTES
Encounter Date: 11/10/2022       History     Chief Complaint   Patient presents with    Abdominal Pain     Pt reports gallbladder issues over the last few months (sees Dr Mcghee), has appointment to see surgeon to have gallbladder removed but the pain is unbearable at this time.      HPI  Review of patient's allergies indicates:   Allergen Reactions    Amlodipine      Other reaction(s): Propensity to adverse reactions to drug    Atorvastatin      Other reaction(s): body aches    Metoprolol      Other reaction(s): body aches    Morphine      Other reaction(s): Hallucinations    Ranitidine      Other reaction(s): Propensity to adverse reactions to drug    Grass pollen-june grass standard Rash     No past medical history on file.  Past Surgical History:   Procedure Laterality Date    ANGIOGRAPHY      BACK SURGERY      CAROTID STENT      COLONOSCOPY  06/01/2016    FLUOROSCOPY      GANGLION CYST EXCISION      HERNIA REPAIR      NECK SURGERY      PENILE PROSTHESIS IMPLANT      PROSTATECTOMY      ROTATOR CUFF REPAIR      TOTAL HIP ARTHROPLASTY      TOTAL KNEE ARTHROPLASTY  12/17/2018    TOTAL KNEE ARTHROPLASTY Left     TRANSURETHRAL RESECTION OF PROSTATE       Family History   Problem Relation Age of Onset    Hyperlipidemia Mother     Heart failure Mother     Hyperlipidemia Father     Heart attack Sister     Coronary artery disease Sister     Cancer Brother      Social History     Tobacco Use    Smoking status: Never    Smokeless tobacco: Never     Review of Systems    Physical Exam     Initial Vitals [11/10/22 1906]   BP Pulse Resp Temp SpO2   (!) 151/74 64 18 97.9 °F (36.6 °C) 95 %      MAP       --         Physical Exam    ED Course   Procedures  Labs Reviewed   BLOOD CULTURE [facility]LG   BLOOD CULTURE OLG   CBC W/ AUTO DIFFERENTIAL    Narrative:     The following orders were created for panel order CBC auto differential.  Procedure                               Abnormality         Status       "               ---------                               -----------         ------                     CBC with Differential[[id number]]                                                         Please view results for these tests on the individual orders.   COMPREHENSIVE METABOLIC PANEL   LIPASE   URINALYSIS, REFLEX TO URINE CULTURE   CBC WITH DIFFERENTIAL   LACTIC ACID, PLASMA     EKG Readings: (Independently Interpreted)   Rhythm: Sinus Bradycardia. ST Segments: Normal ST Segments. T Waves: Normal.   Premature atrial complexes in a bigeminy pattern     Imaging Results    None          Medications   ondansetron injection 4 mg (has no administration in time range)   morphine injection 4 mg (has no administration in time range)   LIDOcaine HCl 2% oral solution 15 mL (15 mLs Oral Given 11/10/22 1922)   hyoscyamine ODT 0.125 mg (0.125 mg Oral Given 11/10/22 1922)   aluminum-magnesium hydroxide-simethicone 200-200-20 mg/5 mL suspension 30 mL (30 mLs Oral Given 11/10/22 1922)                              Clinical Impression:    ***Please document a Clinical Impression and click the "Refresh" button to refresh your note and automatically pull in before signing.***         "

## 2022-11-13 LAB
BACTERIA BLD CULT: ABNORMAL
GRAM STN SPEC: ABNORMAL

## 2022-11-16 LAB — BACTERIA BLD CULT: NORMAL

## 2022-11-17 ENCOUNTER — HOSPITAL ENCOUNTER (OUTPATIENT)
Dept: RADIOLOGY | Facility: HOSPITAL | Age: 77
Discharge: HOME | End: 2022-11-17
Attending: SURGERY
Payer: MEDICARE

## 2022-11-17 DIAGNOSIS — Z01.818 PREOPERATIVE TESTING: ICD-10-CM

## 2022-11-17 DIAGNOSIS — Z01.818 PREOPERATIVE TESTING: Primary | ICD-10-CM

## 2022-11-17 PROCEDURE — 71046 X-RAY EXAM CHEST 2 VIEWS: CPT | Mod: TC

## 2022-11-18 ENCOUNTER — HOSPITAL ENCOUNTER (OUTPATIENT)
Facility: HOSPITAL | Age: 77
Discharge: HOME | End: 2022-11-19
Attending: SURGERY | Admitting: SURGERY
Payer: MEDICARE

## 2022-11-18 ENCOUNTER — ANESTHESIA (OUTPATIENT)
Dept: SURGERY | Facility: HOSPITAL | Age: 77
End: 2022-11-18
Payer: MEDICARE

## 2022-11-18 ENCOUNTER — ANESTHESIA EVENT (OUTPATIENT)
Dept: SURGERY | Facility: HOSPITAL | Age: 77
End: 2022-11-18
Payer: MEDICARE

## 2022-11-18 DIAGNOSIS — K80.20 CHOLELITHIASES: ICD-10-CM

## 2022-11-18 DIAGNOSIS — K80.20 CALCULUS OF GALLBLADDER WITHOUT CHOLECYSTITIS WITHOUT OBSTRUCTION: ICD-10-CM

## 2022-11-18 PROCEDURE — C1729 CATH, DRAINAGE: HCPCS | Performed by: SURGERY

## 2022-11-18 PROCEDURE — 63600175 PHARM REV CODE 636 W HCPCS: Performed by: ANESTHESIOLOGY

## 2022-11-18 PROCEDURE — 63600175 PHARM REV CODE 636 W HCPCS: Performed by: NURSE PRACTITIONER

## 2022-11-18 PROCEDURE — 88304 TISSUE EXAM BY PATHOLOGIST: CPT | Performed by: SURGERY

## 2022-11-18 PROCEDURE — 37000008 HC ANESTHESIA 1ST 15 MINUTES: Performed by: SURGERY

## 2022-11-18 PROCEDURE — 25000003 PHARM REV CODE 250: Performed by: NURSE PRACTITIONER

## 2022-11-18 PROCEDURE — 25000003 PHARM REV CODE 250: Performed by: SURGERY

## 2022-11-18 PROCEDURE — 37000009 HC ANESTHESIA EA ADD 15 MINS: Performed by: SURGERY

## 2022-11-18 PROCEDURE — 36000709 HC OR TIME LEV III EA ADD 15 MIN: Performed by: SURGERY

## 2022-11-18 PROCEDURE — 63600175 PHARM REV CODE 636 W HCPCS: Performed by: NURSE ANESTHETIST, CERTIFIED REGISTERED

## 2022-11-18 PROCEDURE — 71000039 HC RECOVERY, EACH ADD'L HOUR: Performed by: SURGERY

## 2022-11-18 PROCEDURE — 36000708 HC OR TIME LEV III 1ST 15 MIN: Performed by: SURGERY

## 2022-11-18 PROCEDURE — 71000016 HC POSTOP RECOV ADDL HR: Performed by: SURGERY

## 2022-11-18 PROCEDURE — 25000003 PHARM REV CODE 250: Performed by: NURSE ANESTHETIST, CERTIFIED REGISTERED

## 2022-11-18 PROCEDURE — 27201423 OPTIME MED/SURG SUP & DEVICES STERILE SUPPLY: Performed by: SURGERY

## 2022-11-18 PROCEDURE — 71000015 HC POSTOP RECOV 1ST HR: Performed by: SURGERY

## 2022-11-18 PROCEDURE — 71000033 HC RECOVERY, INTIAL HOUR: Performed by: SURGERY

## 2022-11-18 RX ORDER — ACETAMINOPHEN 500 MG
1000 TABLET ORAL
Status: COMPLETED | OUTPATIENT
Start: 2022-11-18 | End: 2022-11-18

## 2022-11-18 RX ORDER — ONDANSETRON 4 MG/1
4 TABLET, ORALLY DISINTEGRATING ORAL
Status: COMPLETED | OUTPATIENT
Start: 2022-11-18 | End: 2022-11-18

## 2022-11-18 RX ORDER — TRAMADOL HYDROCHLORIDE 50 MG/1
50 TABLET ORAL EVERY 4 HOURS PRN
Status: DISCONTINUED | OUTPATIENT
Start: 2022-11-18 | End: 2023-01-13

## 2022-11-18 RX ORDER — ENOXAPARIN SODIUM 100 MG/ML
40 INJECTION SUBCUTANEOUS
Status: COMPLETED | OUTPATIENT
Start: 2022-11-18 | End: 2022-11-18

## 2022-11-18 RX ORDER — CEFAZOLIN SODIUM 1 G/3ML
INJECTION, POWDER, FOR SOLUTION INTRAMUSCULAR; INTRAVENOUS
Status: DISPENSED
Start: 2022-11-18 | End: 2022-11-19

## 2022-11-18 RX ORDER — PROCHLORPERAZINE EDISYLATE 5 MG/ML
5 INJECTION INTRAMUSCULAR; INTRAVENOUS EVERY 6 HOURS PRN
Status: DISCONTINUED | OUTPATIENT
Start: 2022-11-18 | End: 2023-01-13

## 2022-11-18 RX ORDER — BUPIVACAINE HYDROCHLORIDE AND EPINEPHRINE 5; 5 MG/ML; UG/ML
INJECTION, SOLUTION EPIDURAL; INTRACAUDAL; PERINEURAL
Status: DISPENSED
Start: 2022-11-18 | End: 2022-11-19

## 2022-11-18 RX ORDER — BUPIVACAINE HYDROCHLORIDE AND EPINEPHRINE 2.5; 5 MG/ML; UG/ML
INJECTION, SOLUTION EPIDURAL; INFILTRATION; INTRACAUDAL; PERINEURAL
Status: DISCONTINUED | OUTPATIENT
Start: 2022-11-18 | End: 2022-11-18 | Stop reason: HOSPADM

## 2022-11-18 RX ORDER — PROPOFOL 10 MG/ML
VIAL (ML) INTRAVENOUS
Status: DISCONTINUED | OUTPATIENT
Start: 2022-11-18 | End: 2022-11-18

## 2022-11-18 RX ORDER — SODIUM CHLORIDE, SODIUM LACTATE, POTASSIUM CHLORIDE, CALCIUM CHLORIDE 600; 310; 30; 20 MG/100ML; MG/100ML; MG/100ML; MG/100ML
INJECTION, SOLUTION INTRAVENOUS CONTINUOUS
Status: DISCONTINUED | OUTPATIENT
Start: 2022-11-18 | End: 2023-01-13

## 2022-11-18 RX ORDER — SODIUM CITRATE AND CITRIC ACID MONOHYDRATE 334; 500 MG/5ML; MG/5ML
30 SOLUTION ORAL ONCE
Status: CANCELLED | OUTPATIENT
Start: 2022-11-18 | End: 2022-11-18

## 2022-11-18 RX ORDER — ENOXAPARIN SODIUM 100 MG/ML
40 INJECTION SUBCUTANEOUS EVERY 24 HOURS
Status: DISCONTINUED | OUTPATIENT
Start: 2022-11-18 | End: 2023-01-13

## 2022-11-18 RX ORDER — SODIUM CHLORIDE, SODIUM LACTATE, POTASSIUM CHLORIDE, CALCIUM CHLORIDE 600; 310; 30; 20 MG/100ML; MG/100ML; MG/100ML; MG/100ML
1000 INJECTION, SOLUTION INTRAVENOUS CONTINUOUS
Status: CANCELLED | OUTPATIENT
Start: 2022-11-18

## 2022-11-18 RX ORDER — ACETAMINOPHEN 10 MG/ML
1000 INJECTION, SOLUTION INTRAVENOUS ONCE
Status: DISCONTINUED | OUTPATIENT
Start: 2022-11-18 | End: 2022-11-18

## 2022-11-18 RX ORDER — SODIUM CITRATE AND CITRIC ACID MONOHYDRATE 334; 500 MG/5ML; MG/5ML
SOLUTION ORAL
Status: DISPENSED
Start: 2022-11-18 | End: 2022-11-18

## 2022-11-18 RX ORDER — EPHEDRINE SULFATE 50 MG/ML
INJECTION, SOLUTION INTRAVENOUS
Status: DISCONTINUED | OUTPATIENT
Start: 2022-11-18 | End: 2022-11-18

## 2022-11-18 RX ORDER — FENTANYL CITRATE 50 UG/ML
INJECTION, SOLUTION INTRAMUSCULAR; INTRAVENOUS
Status: DISCONTINUED | OUTPATIENT
Start: 2022-11-18 | End: 2022-11-18

## 2022-11-18 RX ORDER — ONDANSETRON 2 MG/ML
4 INJECTION INTRAMUSCULAR; INTRAVENOUS EVERY 6 HOURS PRN
Status: DISCONTINUED | OUTPATIENT
Start: 2022-11-18 | End: 2023-01-13

## 2022-11-18 RX ORDER — ROCURONIUM BROMIDE 10 MG/ML
INJECTION, SOLUTION INTRAVENOUS
Status: DISCONTINUED | OUTPATIENT
Start: 2022-11-18 | End: 2022-11-18

## 2022-11-18 RX ORDER — MEPERIDINE HYDROCHLORIDE 25 MG/ML
25 INJECTION INTRAMUSCULAR; INTRAVENOUS; SUBCUTANEOUS EVERY 4 HOURS PRN
Status: ACTIVE | OUTPATIENT
Start: 2022-11-18 | End: 2022-11-19

## 2022-11-18 RX ORDER — LIDOCAINE HYDROCHLORIDE 10 MG/ML
INJECTION, SOLUTION EPIDURAL; INFILTRATION; INTRACAUDAL; PERINEURAL
Status: DISCONTINUED | OUTPATIENT
Start: 2022-11-18 | End: 2022-11-18

## 2022-11-18 RX ORDER — HYDROCODONE BITARTRATE AND ACETAMINOPHEN 5; 325 MG/1; MG/1
1 TABLET ORAL EVERY 6 HOURS PRN
Status: DISCONTINUED | OUTPATIENT
Start: 2022-11-18 | End: 2023-01-13

## 2022-11-18 RX ORDER — HYDROMORPHONE HYDROCHLORIDE 2 MG/ML
INJECTION, SOLUTION INTRAMUSCULAR; INTRAVENOUS; SUBCUTANEOUS
Status: DISCONTINUED | OUTPATIENT
Start: 2022-11-18 | End: 2022-11-18

## 2022-11-18 RX ORDER — MIDAZOLAM HYDROCHLORIDE 1 MG/ML
2 INJECTION INTRAMUSCULAR; INTRAVENOUS ONCE AS NEEDED
Status: CANCELLED | OUTPATIENT
Start: 2022-11-18 | End: 2034-04-16

## 2022-11-18 RX ORDER — IPRATROPIUM BROMIDE AND ALBUTEROL SULFATE 2.5; .5 MG/3ML; MG/3ML
3 SOLUTION RESPIRATORY (INHALATION) ONCE AS NEEDED
Status: DISCONTINUED | OUTPATIENT
Start: 2022-11-18 | End: 2022-11-18

## 2022-11-18 RX ORDER — ONDANSETRON 4 MG/1
4 TABLET, ORALLY DISINTEGRATING ORAL EVERY 6 HOURS PRN
Status: CANCELLED | OUTPATIENT
Start: 2022-11-18

## 2022-11-18 RX ORDER — LIDOCAINE HYDROCHLORIDE 10 MG/ML
1 INJECTION, SOLUTION EPIDURAL; INFILTRATION; INTRACAUDAL; PERINEURAL ONCE
Status: CANCELLED | OUTPATIENT
Start: 2022-11-18 | End: 2022-11-18

## 2022-11-18 RX ORDER — ONDANSETRON HYDROCHLORIDE 2 MG/ML
INJECTION, SOLUTION INTRAMUSCULAR; INTRAVENOUS
Status: DISCONTINUED | OUTPATIENT
Start: 2022-11-18 | End: 2022-11-18

## 2022-11-18 RX ORDER — OXYMETAZOLINE HCL 0.05 %
SPRAY, NON-AEROSOL (ML) NASAL
Status: DISPENSED
Start: 2022-11-18 | End: 2022-11-19

## 2022-11-18 RX ORDER — HYDROMORPHONE HYDROCHLORIDE 2 MG/ML
0.2 INJECTION, SOLUTION INTRAMUSCULAR; INTRAVENOUS; SUBCUTANEOUS EVERY 5 MIN PRN
Status: DISCONTINUED | OUTPATIENT
Start: 2022-11-18 | End: 2022-11-18

## 2022-11-18 RX ORDER — DEXAMETHASONE SODIUM PHOSPHATE 4 MG/ML
INJECTION, SOLUTION INTRA-ARTICULAR; INTRALESIONAL; INTRAMUSCULAR; INTRAVENOUS; SOFT TISSUE
Status: DISCONTINUED | OUTPATIENT
Start: 2022-11-18 | End: 2022-11-18

## 2022-11-18 RX ORDER — MEPERIDINE HYDROCHLORIDE 25 MG/ML
12.5 INJECTION INTRAMUSCULAR; INTRAVENOUS; SUBCUTANEOUS ONCE AS NEEDED
Status: DISCONTINUED | OUTPATIENT
Start: 2022-11-18 | End: 2022-11-18

## 2022-11-18 RX ORDER — GABAPENTIN 300 MG/1
300 CAPSULE ORAL
Status: COMPLETED | OUTPATIENT
Start: 2022-11-18 | End: 2022-11-18

## 2022-11-18 RX ADMIN — ONDANSETRON 4 MG: 2 INJECTION INTRAMUSCULAR; INTRAVENOUS at 02:11

## 2022-11-18 RX ADMIN — SODIUM CHLORIDE, POTASSIUM CHLORIDE, SODIUM LACTATE AND CALCIUM CHLORIDE: 600; 310; 30; 20 INJECTION, SOLUTION INTRAVENOUS at 02:11

## 2022-11-18 RX ADMIN — HYDROCODONE BITARTRATE AND ACETAMINOPHEN 1 TABLET: 5; 325 TABLET ORAL at 07:11

## 2022-11-18 RX ADMIN — ENOXAPARIN SODIUM 40 MG: 40 INJECTION SUBCUTANEOUS at 11:11

## 2022-11-18 RX ADMIN — HYDROMORPHONE HYDROCHLORIDE 0.2 MG: 2 INJECTION INTRAMUSCULAR; INTRAVENOUS; SUBCUTANEOUS at 03:11

## 2022-11-18 RX ADMIN — PROPOFOL 50 MG: 10 INJECTION, EMULSION INTRAVENOUS at 02:11

## 2022-11-18 RX ADMIN — FENTANYL CITRATE 100 MCG: 50 INJECTION, SOLUTION INTRAMUSCULAR; INTRAVENOUS at 02:11

## 2022-11-18 RX ADMIN — ROCURONIUM BROMIDE 50 MG: 50 INJECTION INTRAVENOUS at 02:11

## 2022-11-18 RX ADMIN — ACETAMINOPHEN 1000 MG: 500 TABLET ORAL at 11:11

## 2022-11-18 RX ADMIN — PROPOFOL 80 MG: 10 INJECTION, EMULSION INTRAVENOUS at 02:11

## 2022-11-18 RX ADMIN — EPHEDRINE SULFATE 25 MG: 50 INJECTION INTRAVENOUS at 02:11

## 2022-11-18 RX ADMIN — LIDOCAINE HYDROCHLORIDE 50 MG: 10 INJECTION, SOLUTION EPIDURAL; INFILTRATION; INTRACAUDAL; PERINEURAL at 02:11

## 2022-11-18 RX ADMIN — DEXTROSE MONOHYDRATE 1 G: 5 INJECTION INTRAVENOUS at 01:11

## 2022-11-18 RX ADMIN — DEXAMETHASONE SODIUM PHOSPHATE 4 MG: 4 INJECTION, SOLUTION INTRA-ARTICULAR; INTRALESIONAL; INTRAMUSCULAR; INTRAVENOUS; SOFT TISSUE at 02:11

## 2022-11-18 RX ADMIN — PROPOFOL 100 MG: 10 INJECTION, EMULSION INTRAVENOUS at 02:11

## 2022-11-18 RX ADMIN — ONDANSETRON 4 MG: 4 TABLET, ORALLY DISINTEGRATING ORAL at 11:11

## 2022-11-18 RX ADMIN — HYDROMORPHONE HYDROCHLORIDE 0.6 MG: 2 INJECTION, SOLUTION INTRAMUSCULAR; INTRAVENOUS; SUBCUTANEOUS at 03:11

## 2022-11-18 RX ADMIN — GABAPENTIN 300 MG: 300 CAPSULE ORAL at 11:11

## 2022-11-18 NOTE — ANESTHESIA PREPROCEDURE EVALUATION
11/18/2022  Augusto Snow is a 77 y.o., male with h/o CAD, stents, followed by [facility], EF nl, HTN, carotids clear, and other medical problems listed in the EMR      Pre-op Assessment    I have reviewed the Patient Summary Reports.     I have reviewed the Nursing Notes. I have reviewed the NPO Status.   I have reviewed the Medications.     Review of Systems      Physical Exam  General: Well nourished and Cooperative    Airway:  Mallampati: III   Mouth Opening: Normal  TM Distance: Normal  Tongue: Normal  Neck ROM: Normal ROM    Dental:Broken upper L front tooth and remaining teeth in poor shape, pt is aware that he may loose the broken tooth or other teeth during intubation and accepts the risk  Chest/Lungs:  Clear to auscultation    Heart:  Rate: Normal        Anesthesia Plan  Type of Anesthesia, risks & benefits discussed:    Anesthesia Type: Gen ETT  Intra-op Monitoring Plan: Standard ASA Monitors  Post Op Pain Control Plan: multimodal analgesia  Induction:  IV  Informed Consent: Informed consent signed with the Patient and all parties understand the risks and agree with anesthesia plan.  All questions answered.   ASA Score: 3  Day of Surgery Review of History & Physical: H&P Update referred to the surgeon/provider.  Anesthesia Plan Notes: Pt has been told that due to his posterior approach neck surgery, that care needs to be taken to keep neck in neutral position  And he has no bony protection for his spinal cord from the posterior aspect.    Ready For Surgery From Anesthesia Perspective.     .  I explained anesthesia plan to patient/responsbile party if available.  Anesthesia consent done going over the material facts, risks, complications & alternatives, obtained which includes the possibility of altering the anesthesia plan.  I reviewed problem list, appropriate labs, any workup, Xray, EKG etc noted  "below.  Patients condition is satisfactory to proceed with anesthesia plan unless otherwise noted (see anesthesia chart for details of the anesthesia plan carried out).      Pre-operative evaluation for Procedure(s) (LRB):  CHOLECYSTECTOMY, LAPAROSCOPIC (N/A)    BP (!) 157/72   Pulse (!) 59   Temp 36.7 °C (98.1 °F) (Oral)   Resp 18   Ht 5' 8" (1.727 m)   Wt 101.6 kg (223 lb 15.8 oz)   SpO2 95%   BMI 34.06 kg/m²     Patient Active Problem List   Diagnosis    Chronic congestive heart failure    Coronary arteriosclerosis    Degeneration of intervertebral disc of lumbar region    Depressive disorder    Gastroesophageal reflux disease    Hyperlipidemia    Hypertension    Osteoarthritis of right knee    Sleep apnea    History of tobacco use    Presence of stent in artery    Obesity (BMI 35.0-39.9 without comorbidity)       Review of patient's allergies indicates:   Allergen Reactions    Amlodipine Itching     Other reaction(s): Propensity to adverse reactions to drug    Atorvastatin      Other reaction(s): body aches    Metoprolol      Other reaction(s): body aches    Morphine Itching     Other reaction(s): Hallucinations    Ranitidine      Other reaction(s): Propensity to adverse reactions to drug    Grass pollen-darek grass standard Rash       Current Outpatient Medications   Medication Instructions    aspirin (ECOTRIN) 81 mg, Oral, Daily    atenoloL (TENORMIN) 50 mg, Oral, 2 times daily    cholecalciferol (vitamin D3) (VITAMIN D3) 1,000 Units, Oral, Daily    ciprofloxacin HCl (CIPRO) 500 mg, Oral, 2 times daily    citalopram (CELEXA) 20 MG tablet TAKE 1 AND 1/2 TABLETS BY MOUTH EVERY DAY    cyanocobalamin (vitamin B-12) 50 mcg, Oral    dexlansoprazole (DEXILANT) 60 mg capsule Dexilant 60 mg capsule, delayed release    diazePAM (VALIUM) 10 MG Tab TAKE ONE (1) TABLET BY MOUTH EVERY EIGHT (8) HOURS AS NEEDED FOR ANXIETY    dicyclomine (BENTYL) 20 mg, Oral, Daily    fenofibrate micronized " (LOFIBRA) 200 mg, Oral, With breakfast    hydrALAZINE (APRESOLINE) 25 mg, Every 8 hours    HYDROcodone-acetaminophen (NORCO)  mg per tablet 1 tablet, Oral, Every 6 hours PRN    icosapent ethyL (VASCEPA) 2 g, Oral, 2 times daily    isosorbide mononitrate (IMDUR) 30 mg, Oral    lysine 1,000 mg Tab 1 tablet, Oral, Daily    niacin 250 mg, Oral, Nightly    nitroGLYCERIN (NITROSTAT) 0.4 MG SL tablet Nitrostat 0.4 mg sublingual tablet   Place by sublingual route.    potassium gluconate 595 mg (99 mg) Tab 1 tablet, Oral, Once    rosuvastatin (CRESTOR) 20 MG tablet rosuvastatin 20 mg tablet    triamcinolone acetonide 0.1% (KENALOG) 0.1 % cream Topical    XARELTO 2.5 mg, Oral, 2 times daily       Past Surgical History:   Procedure Laterality Date    ANGIOGRAPHY      BACK SURGERY      x 2    COLONOSCOPY  06/01/2016    coronary stents      6  stents    FLUOROSCOPY      GANGLION CYST EXCISION      HERNIA REPAIR      NECK SURGERY      plates /screws    PENILE PROSTHESIS IMPLANT      PROSTATECTOMY      ROTATOR CUFF REPAIR Left     TONSILLECTOMY      TOTAL HIP ARTHROPLASTY Bilateral     TOTAL KNEE ARTHROPLASTY Bilateral 12/17/2018       Social History     Socioeconomic History    Marital status:    Tobacco Use    Smoking status: Never    Smokeless tobacco: Never   Substance and Sexual Activity    Alcohol use: Not Currently    Drug use: Never    Sexual activity: Not Currently       Lab Results   Component Value Date    WBC 7.0 11/16/2022    HGB 15.4 11/16/2022    HCT 48.5 11/16/2022    MCV 86.3 11/16/2022     11/16/2022          BMP  Lab Results   Component Value Date    HCT 48.5 11/16/2022     11/16/2022    K 5.1 11/16/2022    BUN 20.0 (H) 11/16/2022    CREATININE 1.16 11/16/2022    CALCIUM 9.7 11/16/2022        INR  No results for input(s): PT, INR, PROTIME, APTT in the last 72 hours.        Diagnostic Studies:      EKG:  Results for orders placed or performed in visit on  11/10/22   EKG 12-lead    Collection Time: 11/10/22  7:15 PM    Narrative    Test Reason :     Vent. Rate : 058 BPM     Atrial Rate : 058 BPM     P-R Int : 164 ms          QRS Dur : 088 ms      QT Int : 440 ms       P-R-T Axes : 072 -23 006 degrees     QTc Int : 431 ms    Sinus bradycardia  Baseline Artifact    Otherwise normal ECG  No previous ECGs available  Confirmed by Geo Elizabeth MD ([id number]) on 11/11/2022 6:11:43 PM    Referred By: JORGE   SELF           Confirmed By:Geo Elizabeth MD

## 2022-11-18 NOTE — OP NOTE
[facility] - Periop Services  Surgery Department  Operative Note    SUMMARY     Date of Procedure: 11/18/2022     Procedure: Procedure(s) (LRB):  CHOLECYSTECTOMY, LAPAROSCOPIC (N/A)     Surgeon(s) and Role:     * Tito Palomino MD - Primary    Assisting Surgeon: None    Pre-Operative Diagnosis: Calculus of gallbladder without cholecystitis without obstruction [K80.20]    Post-Operative Diagnosis: Post-Op Diagnosis Codes:     * Calculus of gallbladder without cholecystitis without obstruction [K80.20]    Anesthesia: General    Operative Findings (including complications, if any): chronic cholecystitis    Description of Technical Procedures: The patient was taken to the operating room. Patient was placed under general anesthesia. Abdomen was prepped and draped in the usual sterile fashion. An appropriate timeout was performed. Optical tipped trocar was used to access the peritoneal cavity. Pneumoperitoneum was instituted. Abdominal exploration was negative. Gallbladder was noted and held in a cephalad direction. The infundibulum was noted and held in a lateral direction. The peritoneum overlying the infundibulum was dissected revealing the cystic duct gallbladder junction and the cystic artery. The critical view was obtained. The cystic duct and cystic artery were clipped and transected. The gallbladder was removed from the undersurface of the liver with sharp and electric dissection. There was some bleeding from the liver .  Hemostasis assured with cautery and topical hemostatic agents. Hemostasis was assured. The clips were in excellent position and there was no bleeding or leakage of bile.  The gallbladder was removed from the abdomen. Once again hemostasis was assured the operative site was irrigated until clear. Lizandro drain left in subhepatic space. Trocars were removed and pneumoperitoneum released the incisions were closed with Vicryl.      Estimated Blood Loss (EBL): * No values recorded  between 11/18/2022  2:20 PM and 11/18/2022  3:09 PM *             Specimens:   Specimen (24h ago, onward)       Start     Ordered    11/18/22 1436  Specimen to Pathology  RELEASE UPON ORDERING        References:    Click here for ordering Quick Tip   Question:  Release to patient  Answer:  Immediate    11/18/22 1436                            Condition: Good    Disposition: PACU - hemodynamically stable.    Attestation: I was present and scrubbed for the entire procedure.

## 2022-11-18 NOTE — ANESTHESIA PROCEDURE NOTES
Intubation    Date/Time: 11/18/2022 2:01 PM  Performed by: Roxanna Johnson CRNA  Authorized by: Marcel Harden MD     Intubation:     Induction:  Intravenous    Intubated:  Postinduction    Mask Ventilation:  Easy mask    Attempts:  1    Attempted By:  CRNA    Method of Intubation:  Direct    Blade:  Glidescope 3    Laryngeal View Grade: Grade I - full view of cords      Difficult Airway Encountered?: No      Complications:  None    Airway Device:  Oral endotracheal tube    Airway Device Size:  7.5    Style/Cuff Inflation:  Cuffed (inflated to minimal occlusive pressure)    Inflation Amount (mL):  6    Tube secured:  22    Secured at:  The lips    Placement Verified By:  Capnometry    Complicating Factors:  None    Findings Post-Intubation:  BS equal bilateral  Notes:      Easy mask ventilation. No oral airway used. Neck alignment maintained on pink foam neck pillow to pt comfort prior to induction. No neck extension used. Glidescope gvl 3 with full visualization. Dentition intact as preop.no changes in dentition.

## 2022-11-18 NOTE — DISCHARGE INSTRUCTIONS
Dr. Palomino's Discharge Instructions   Laparoscopic Cholecystectomy     Dr. Palomino's Sutured Wound Care Instructions:    - Keep surgical dressing clean and dry for 48 hours post op, then ok to remove dressing and shower.  - Wash incision daily with antibacterial soap and water. Pat dry.   - Do not remove steri strips for 5-7 days post op. After post op day 7, ok to remove steri strips once edges of steri strips begin to curl. Do not keep steri strips in place longer than 10 days after surgery.     No lifting (over 10 pounds) or straining for 2 weeks after surgery.    No driving for 3 days after surgery, or as long as taking narcotic pain medication.     Follow low fat diet (gallbladder eating plan listed below) for the first 4 weeks after surgery. After 4 weeks, ok to advance to regular diet as tolerated.    Resume xarelto and crestor tomorrow 11/20/22.  Continue to hold aspirin for 2 more weeks.  Resume December 3rd.    Apply neosporin ointment to skin tear from tape removal site twice a day until healed.     Contact Dr. Palomino's office with any questions or concerns. [phone]      Cholelithiasis    Cholelithiasis is a form of gallbladder disease in which gallstones form in the gallbladder. The gallbladder is an organ that stores bile. Bile is made in the liver, and it helps to digest fats. Gallstones begin as small crystals and slowly grow into stones. They may cause no symptoms until the gallbladder tightens (contracts) and a gallstone is blocking the duct (gallbladder attack), which can cause pain. Cholelithiasis is also referred to as gallstones.  There are two main types of gallstones:   Cholesterol stones. These are made of hardened cholesterol and are usually yellow-green in color. They are the most common type of gallstone. Cholesterol is a white, waxy, fat-like substance that is made in the liver.   Pigment stones. These are dark in color and are made of a red-yellow substance that forms when  hemoglobin from red blood cells breaks down (bilirubin).  What are the causes?  This condition may be caused by an imbalance in the substances that bile is made of. This can happen if the bile:   Has too much bilirubin.   Has too much cholesterol.   Does not have enough bile salts. These salts help the body absorb and digest fats.  In some cases, this condition can also be caused by the gallbladder not emptying completely or often enough.  What increases the risk?  The following factors may make you more likely to develop this condition:   Being female.   Having multiple pregnancies. Health care providers sometimes advise removing diseased gallbladders before future pregnancies.   Eating a diet that is heavy in fried foods, fat, and refined carbohydrates, like white bread and white rice.   Being obese.   Being older than age 40.   Prolonged use of medicines that contain female hormones (estrogen).   Having diabetes mellitus.   Rapidly losing weight.   Having a family history of gallstones.   Being of  or [place] descent.   Having an intestinal disease such as Crohn disease.   Having metabolic syndrome.   Having cirrhosis.   Having severe types of anemia such as sickle cell anemia.  What are the signs or symptoms?  In most cases, there are no symptoms. These are known as silent gallstones. If a gallstone blocks the bile ducts, it can cause a gallbladder attack. The main symptom of a gallbladder attack is sudden pain in the upper right abdomen. The pain usually comes at night or after eating a large meal. The pain can last for one or several hours and can spread to the right shoulder or chest.  If the bile duct is blocked for more than a few hours, it can cause infection or inflammation of the gallbladder, liver, or pancreas, which may cause:   Nausea.   Vomiting.   Abdominal pain that lasts for 5 hours or more.   Fever or chills.   Yellowing of the skin or the whites of the eyes (jaundice).   Dark  urine.   Light-colored stools.  How is this diagnosed?  This condition may be diagnosed based on:   A physical exam.   Your medical history.   An ultrasound of your gallbladder.   CT scan.   MRI.   Blood tests to check for signs of infection or inflammation.   A scan of your gallbladder and bile ducts (biliary system) using nonharmful radioactive material and special cameras that can see the radioactive material (cholescintigram). This test checks to see how your gallbladder contracts and whether bile ducts are blocked.   Inserting a small tube with a camera on the end (endoscope) through your mouth to inspect bile ducts and check for blockages (endoscopic retrograde cholangiopancreatogram).  How is this treated?  Treatment for gallstones depends on the severity of the condition. Silent gallstones do not need treatment. If the gallstones cause a gallbladder attack or other symptoms, treatment may be required. Options for treatment include:   Surgery to remove the gallbladder (cholecystectomy). This is the most common treatment.   Medicines to dissolve gallstones. These are most effective at treating small gallstones. You may need to take medicines for up to 6-12 months.   Shock wave treatment (extracorporeal biliary lithotripsy). In this treatment, an ultrasound machine sends shock waves to the gallbladder to break gallstones into smaller pieces. These pieces can then be passed into the intestines or be dissolved by medicine. This is rarely used.   Removing gallstones through endoscopic retrograde cholangiopancreatogram. A small basket can be attached to the endoscope and used to capture and remove gallstones.  Follow these instructions at home:   Take over-the-counter and prescription medicines only as told by your health care provider.   Maintain a healthy weight and follow a healthy diet. This includes:  ? Reducing fatty foods, such as fried food.  ? Reducing refined carbohydrates, like white bread and white  rice.  ? Increasing fiber. Aim for foods like almonds, fruit, and beans.   Keep all follow-up visits as told by your health care provider. This is important.  Contact a health care provider if:   You think you have had a gallbladder attack.   You have been diagnosed with silent gallstones and you develop abdominal pain or indigestion.  Get help right away if:   You have pain from a gallbladder attack that lasts for more than 2 hours.   You have abdominal pain that lasts for more than 5 hours.   You have a fever or chills.   You have persistent nausea and vomiting.   You develop jaundice.   You have dark urine or light-colored stools.  Summary   Cholelithiasis (also called gallstones) is a form of gallbladder disease in which gallstones form in the gallbladder.   This condition is caused by an imbalance in the substances that make up bile. This can happen if the bile has too much cholesterol, too much bilirubin, or not enough bile salts.   You are more likely to develop this condition if you are female, pregnant, using medicines with estrogen, obese, older than age 40, or have a family history of gallstones. You may also develop gallstones if you have diabetes, an intestinal disease, cirrhosis, or metabolic syndrome.   Treatment for gallstones depends on the severity of the condition. Silent gallstones do not need treatment.   If gallstones cause a gallbladder attack or other symptoms, treatment may be needed. The most common treatment is surgery to remove the gallbladder.  This information is not intended to replace advice given to you by your health care provider. Make sure you discuss any questions you have with your health care provider.  Document Released: 12/14/2006 Document Revised: 11/30/2018 Document Reviewed: 09/03/2017  Elsevier Patient Education © 2020 [employer]    Laparoscopic Cholecystectomy  Laparoscopic cholecystectomy is surgery to remove the gallbladder. The gallbladder is a pear-shaped organ  that lies beneath the liver on the right side of the body. The gallbladder stores bile, which is a fluid that helps the body to digest fats. Cholecystectomy is often done for inflammation of the gallbladder (cholecystitis). This condition is usually caused by a buildup of gallstones (cholelithiasis) in the gallbladder. Gallstones can block the flow of bile, which can result in inflammation and pain. In severe cases, emergency surgery may be required.  This procedure is done though small incisions in your abdomen (laparoscopic surgery). A thin scope with a camera (laparoscope) is inserted through one incision. Thin surgical instruments are inserted through the other incisions. In some cases, a laparoscopic procedure may be turned into a type of surgery that is done through a larger incision (open surgery).  What happens during the procedure?     To reduce your risk of infection:  ? Your health care team will wash or sanitize their hands.  ? Your skin will be washed with soap.  ? Hair may be removed from the surgical area.   An IV tube may be inserted into one of your veins.   You will be given one or more of the following:  ? A medicine to help you relax (sedative).  ? A medicine to make you fall asleep (general anesthetic).   A breathing tube will be placed in your mouth.   Your surgeon will make several small cuts (incisions) in your abdomen.   The laparoscope will be inserted through one of the small incisions. The camera on the laparoscope will send images to a TV screen (monitor) in the operating room. This lets your surgeon see inside your abdomen.   Air-like gas will be pumped into your abdomen. This will expand your abdomen to give the surgeon more room to perform the surgery.   Other tools that are needed for the procedure will be inserted through the other incisions. The gallbladder will be removed through one of the incisions.   Your common bile duct may be examined. If stones are found in the common bile  duct, they may be removed.   After your gallbladder has been removed, the incisions will be closed with stitches (sutures), staples, or skin glue.   Your incisions may be covered with a bandage (dressing).  The procedure may vary among health care providers and hospitals.  What happens after the procedure?   Your blood pressure, heart rate, breathing rate, and blood oxygen level will be monitored until the medicines you were given have worn off.   You will be given medicines as needed to control your pain.    This information is not intended to replace advice given to you by your health care provider. Make sure you discuss any questions you have with your health care provider.  Document Released: 12/18/2006 Document Revised: 11/30/2018 Document Reviewed: 06/05/2017  [employer] Patient Education © 2020 [employer]    Gallbladder Eating Plan  If you have a gallbladder condition, you may have trouble digesting fats. Eating a low-fat diet can help reduce your symptoms, and may be helpful before and after having surgery to remove your gallbladder (cholecystectomy). Your health care provider may recommend that you work with a diet and nutrition specialist (dietitian) to help you reduce the amount of fat in your diet.  What are tips for following this plan?  General guidelines   Limit your fat intake to less than 30% of your total daily calories. If you eat around 1,800 calories each day, this is less than 60 grams (g) of fat per day.   Fat is an important part of a healthy diet. Eating a low-fat diet can make it hard to maintain a healthy body weight. Ask your dietitian how much fat, calories, and other nutrients you need each day.   Eat small, frequent meals throughout the day instead of three large meals.   Drink at least 8-10 cups of fluid a day. Drink enough fluid to keep your urine clear or pale yellow.   Limit alcohol intake to no more than 1 drink a day for nonpregnant women and 2 drinks a day for men. One drink  equals 12 oz of beer, 5 oz of wine, or 1½ oz of hard liquor.  Reading food labels   Check Nutrition Facts on food labels for the amount of fat per serving. Choose foods with less than 3 grams of fat per serving.  Shopping   Choose nonfat and low-fat healthy foods. Look for the words nonfat, low fat, or fat free.   Avoid buying processed or prepackaged foods.  Cooking   Cook using low-fat methods, such as baking, broiling, grilling, or boiling.   Cook with small amounts of healthy fats, such as olive oil, grapeseed oil, canola oil, or sunflower oil.  What foods are recommended?     All fresh, frozen, or canned fruits and vegetables.   Whole grains.   Low-fat or non-fat (skim) milk and yogurt.   Lean meat, skinless poultry, fish, eggs, and beans.   Low-fat protein supplement powders or drinks.   Spices and herbs.  What foods are not recommended?   High-fat foods. These include baked goods, fast food, fatty cuts of meat, ice cream, french toast, sweet rolls, pizza, cheese bread, foods covered with butter, creamy sauces, or cheese.   Fried foods. These include french fries, tempura, battered fish, breaded chicken, fried breads, and sweets.   Foods with strong odors.   Foods that cause bloating and gas.  Summary   A low-fat diet can be helpful if you have a gallbladder condition, or before and after gallbladder surgery.   Limit your fat intake to less than 30% of your total daily calories. This is about 60 g of fat if you eat 1,800 calories each day.   Eat small, frequent meals throughout the day instead of three large meals.  This information is not intended to replace advice given to you by your health care provider. Make sure you discuss any questions you have with your health care provider.  Document Released: 12/23/2014 Document Revised: 04/09/2020 Document Reviewed: 01/25/2018  Elsevier Patient Education © 2020 [employer]    Laparoscopic Cholecystectomy, Care After  This sheet gives you information about  how to care for yourself after your procedure. Your health care provider may also give you more specific instructions. If you have problems or questions, contact your health care provider.  What can I expect after the procedure?  After the procedure, it is common to have:   Pain at your incision sites. You will be given medicines to control this pain.   Mild nausea or vomiting.   Bloating and possible shoulder pain from the air-like gas that was used during the procedure.  Follow these instructions at home:  Incision care     Follow instructions from your health care provider about how to take care of your incisions. Make sure you:  ? Wash your hands with soap and water before you change your bandage (dressing). If soap and water are not available, use hand .  ? Change your dressing as told by your health care provider.  ? Leave stitches (sutures), skin glue, or adhesive strips in place. These skin closures may need to be in place for 2 weeks or longer. If adhesive strip edges start to loosen and curl up, you may trim the loose edges. Do not remove adhesive strips completely unless your health care provider tells you to do that.   Do not take baths, swim, or use a hot tub until your health care provider approves. Ask your health care provider if you can take showers. You may only be allowed to take sponge baths for bathing.   Check your incision area every day for signs of infection. Check for:  ? More redness, swelling, or pain.  ? More fluid or blood.  ? Warmth.  ? Pus or a bad smell.  Activity   Do not drive or use heavy machinery while taking prescription pain medicine.   Do not lift anything that is heavier than 10 lb (4.5 kg) until your health care provider approves.   Do not play contact sports until your health care provider approves.   Rest as needed. Do not return to work or school until your health care provider approves.  General instructions   Take over-the-counter and prescription medicines  only as told by your health care provider.   To prevent or treat constipation while you are taking prescription pain medicine, your health care provider may recommend that you:  ? Drink enough fluid to keep your urine clear or pale yellow.  ? Take over-the-counter or prescription medicines.  ? Eat foods that are high in fiber, such as fresh fruits and vegetables, whole grains, and beans.  ? Limit foods that are high in fat and processed sugars, such as fried and sweet foods.  Contact a health care provider if:   You develop a rash.   You have more redness, swelling, or pain around your incisions.   You have more fluid or blood coming from your incisions.   Your incisions feel warm to the touch.   You have pus or a bad smell coming from your incisions.   You have a fever.   One or more of your incisions breaks open.  Get help right away if:   You have trouble breathing.   You have chest pain.   You have increasing pain in your shoulders.   You faint or feel dizzy when you stand.   You have severe pain in your abdomen.   You have nausea or vomiting that lasts for more than one day.   You have leg pain.  This information is not intended to replace advice given to you by your health care provider. Make sure you discuss any questions you have with your health care provider.  Document Released: 12/18/2006 Document Revised: 11/30/2018 Document Reviewed: 06/05/2017  Elsevier Patient Education © 2020 [employer] Inc[employer]

## 2022-11-18 NOTE — ANESTHESIA POSTPROCEDURE EVALUATION
Anesthesia Post Evaluation    Patient: Augusto Snow    Procedure(s) Performed: Procedure(s) (LRB):  CHOLECYSTECTOMY, LAPAROSCOPIC (N/A)    Final Anesthesia Type: general (/Regional//MAC)      Patient location during evaluation: PACU  Post-procedure mental status: @ basline.  Post-procedure vital signs: reviewed and stable  Pain management: adequate    PONV status: See postop meds for drugs used to control n/v if any.  Anesthetic complications: no      Cardiovascular status: blood pressure returned to baseline  Respiratory status: @ baseline.  Hydration status: euvolemic            Vitals Value Taken Time   /70 11/18/22 1525   Temp 98 11/18/22 1529   Pulse 72 11/18/22 1529   Resp 18 11/18/22 1529   SpO2 99 % 11/18/22 1529   Vitals shown include unvalidated device data.      No case tracking events are documented in the log.      Pain/Ernesto Score: Pain Rating Prior to Med Admin: 0 (11/18/2022 11:31 AM)

## 2022-11-18 NOTE — TRANSFER OF CARE
"Anesthesia Transfer of Care Note    Patient: Augusto Snow    Procedure(s) Performed: Procedure(s) (LRB):  CHOLECYSTECTOMY, LAPAROSCOPIC (N/A)    Patient location: PACU    Anesthesia Type: general    Transport from OR: Transported from OR on room air with adequate spontaneous ventilation    Post pain: adequate analgesia    Post assessment: no apparent anesthetic complications    Post vital signs: stable    Level of consciousness: sedated    Nausea/Vomiting: no nausea/vomiting    Complications: none    Transfer of care protocol was followedComments: Afrin applied bilateral nares and nasal airway 70 placed left nare prior to extubation. No oral airway placed due to poor dentition. Neck alignment maintained on pink foam pillow throughout procedure and through transport to PACU.      Last vitals:   Visit Vitals  BP (!) 157/72   Pulse (!) 59   Temp 36.7 °C (98.1 °F) (Oral)   Resp 18   Ht 5' 8" (1.727 m)   Wt 101.6 kg (223 lb 15.8 oz)   SpO2 95%   BMI 34.06 kg/m²     "

## 2022-11-19 LAB
ALBUMIN SERPL-MCNC: 2.7 GM/DL (ref 3.4–4.8)
ALBUMIN/GLOB SERPL: 0.9 RATIO (ref 1.1–2)
ALP SERPL-CCNC: 100 UNIT/L (ref 40–150)
ALT SERPL-CCNC: 93 UNIT/L (ref 0–55)
AST SERPL-CCNC: 103 UNIT/L (ref 5–34)
BASOPHILS # BLD AUTO: 0.02 X10(3)/MCL (ref 0–0.2)
BASOPHILS NFR BLD AUTO: 0.3 %
BILIRUBIN DIRECT+TOT PNL SERPL-MCNC: 1.3 MG/DL
BUN SERPL-MCNC: 19.1 MG/DL (ref 8.4–25.7)
CALCIUM SERPL-MCNC: 8.5 MG/DL (ref 8.8–10)
CHLORIDE SERPL-SCNC: 106 MMOL/L (ref 98–107)
CO2 SERPL-SCNC: 24 MMOL/L (ref 23–31)
CREAT SERPL-MCNC: 1.04 MG/DL (ref 0.73–1.18)
EOSINOPHIL # BLD AUTO: 0 X10(3)/MCL (ref 0–0.9)
EOSINOPHIL NFR BLD AUTO: 0 %
ERYTHROCYTE [DISTWIDTH] IN BLOOD BY AUTOMATED COUNT: 15.4 % (ref 11.5–17)
GFR SERPLBLD CREATININE-BSD FMLA CKD-EPI: >60 MLS/MIN/1.73/M2
GLOBULIN SER-MCNC: 3.1 GM/DL (ref 2.4–3.5)
GLUCOSE SERPL-MCNC: 103 MG/DL (ref 82–115)
HCT VFR BLD AUTO: 44.6 % (ref 42–52)
HGB BLD-MCNC: 13.7 GM/DL (ref 14–18)
IMM GRANULOCYTES # BLD AUTO: 0.09 X10(3)/MCL (ref 0–0.04)
IMM GRANULOCYTES NFR BLD AUTO: 1.2 %
LYMPHOCYTES # BLD AUTO: 0.74 X10(3)/MCL (ref 0.6–4.6)
LYMPHOCYTES NFR BLD AUTO: 9.7 %
MCH RBC QN AUTO: 27.6 PG (ref 27–31)
MCHC RBC AUTO-ENTMCNC: 30.7 MG/DL (ref 33–36)
MCV RBC AUTO: 89.7 FL (ref 80–94)
MONOCYTES # BLD AUTO: 0.41 X10(3)/MCL (ref 0.1–1.3)
MONOCYTES NFR BLD AUTO: 5.4 %
NEUTROPHILS # BLD AUTO: 6.3 X10(3)/MCL (ref 2.1–9.2)
NEUTROPHILS NFR BLD AUTO: 83.4 %
NRBC BLD AUTO-RTO: 0 %
PLATELET # BLD AUTO: 163 X10(3)/MCL (ref 130–400)
PMV BLD AUTO: 10 FL (ref 7.4–10.4)
POTASSIUM SERPL-SCNC: 4.2 MMOL/L (ref 3.5–5.1)
PROT SERPL-MCNC: 5.8 GM/DL (ref 5.8–7.6)
RBC # BLD AUTO: 4.97 X10(6)/MCL (ref 4.7–6.1)
SODIUM SERPL-SCNC: 139 MMOL/L (ref 136–145)
WBC # SPEC AUTO: 7.6 X10(3)/MCL (ref 4.5–11.5)

## 2022-11-19 PROCEDURE — 80053 COMPREHEN METABOLIC PANEL: CPT | Performed by: SURGERY

## 2022-11-19 PROCEDURE — 85025 COMPLETE CBC W/AUTO DIFF WBC: CPT | Performed by: SURGERY

## 2022-11-19 PROCEDURE — 25000003 PHARM REV CODE 250: Performed by: NURSE PRACTITIONER

## 2022-11-19 RX ADMIN — HYDROCODONE BITARTRATE AND ACETAMINOPHEN 1 TABLET: 5; 325 TABLET ORAL at 04:11

## 2022-11-19 NOTE — PLAN OF CARE
Patient resting quietly in bed. Tolerating powerade without difficulty or nausea.  LEANDRO drain with scant serosanguineous drainage

## 2022-11-19 NOTE — PLAN OF CARE
Discussed discharge instructions with patient.  All questions answered.  Written discharge folder given to patient and asked him to review it when he gets home.  Vital signs stable, patient with minimal discomfort to abdomen.  Daughter to drive patient home.

## 2022-11-19 NOTE — PROGRESS NOTES
HPI:  The patient is status post-laparoscopic cholecystectomy on 11/18/22. He has 1/10 pain. He is eating well. The patient denies nausea, fever, or difficulty urinating. The patient complains of some discomfort at drain sit.  Feels much better.   PHYSICAL EXAM:  Physical Exam  Constitutional:       General: He is not in acute distress.     Appearance: He is not ill-appearing.   Eyes:      General: No scleral icterus.     Conjunctiva/sclera: Conjunctivae normal.   Abdominal:      General: Bowel sounds are normal. There is no distension.      Palpations: Abdomen is soft.      Tenderness: There is no abdominal tenderness. There is no guarding.      Comments: Drain w scant sero sanguinous discharge, no evidence of bile   Neurological:      Mental Status: He is alert.       ASSESSMENT:    The patient is doing well after surgery.     PLAN:    Follow up  2 weeks .  Activity:  No lifting 2 weeks, no driving 3 days  Remove drain  Discharge home

## 2022-11-19 NOTE — PLAN OF CARE
Problem: Hypertension Acute  Goal: Blood Pressure Within Desired Range  Outcome: Ongoing, Progressing     Problem: Pain Acute  Goal: Acceptable Pain Control and Functional Ability  Outcome: Ongoing, Progressing     Problem: Fall Injury Risk  Goal: Absence of Fall and Fall-Related Injury  Outcome: Ongoing, Progressing     Problem: Infection  Goal: Absence of Infection Signs and Symptoms  Outcome: Ongoing, Progressing     Problem: Adult Inpatient Plan of Care  Goal: Plan of Care Review  Outcome: Ongoing, Progressing  Goal: Patient-Specific Goal (Individualized)  Outcome: Ongoing, Progressing  Goal: Absence of Hospital-Acquired Illness or Injury  Outcome: Ongoing, Progressing  Goal: Optimal Comfort and Wellbeing  Outcome: Ongoing, Progressing  Goal: Readiness for Transition of Care  Outcome: Ongoing, Progressing

## 2022-11-22 VITALS
SYSTOLIC BLOOD PRESSURE: 155 MMHG | BODY MASS INDEX: 33.95 KG/M2 | DIASTOLIC BLOOD PRESSURE: 64 MMHG | HEIGHT: 68 IN | RESPIRATION RATE: 20 BRPM | WEIGHT: 224 LBS | OXYGEN SATURATION: 92 % | TEMPERATURE: 98 F | HEART RATE: 64 BPM

## 2022-11-22 NOTE — DISCHARGE SUMMARY
[facility]  General Surgery  Discharge Summary      Patient Name: Augusto Snow  MRN: [id number]  Admission Date: 11/18/2022  Hospital Length of Stay: 0 days  Discharge Date and Time: 11/19/2022 11:10 AM  Attending Physician: No att. providers found   Discharging Provider: Tito Palomino MD  Primary Care Provider: Johny Griffith MD     HPI: Admitted for elective cholecystectomy.  Procedure performed.  Admitted overnight due to complex nature of patient and procedure. POD 1 felt much better.  Labs, vitals, and drainage normal.  LEANDRO drain removed.  Patient discharged.      Procedure(s) (LRB):  CHOLECYSTECTOMY, LAPAROSCOPIC (N/A)     Hospital Course: Admitted for elective cholecystectomy.  Procedure performed.  Admitted overnight due to complex nature of patient and procedure. POD 1 felt much better.  Labs, vitals, and drainage normal.  LEANDRO drain removed.  Patient discharged.     Consults:     Significant Diagnostic Studies: Labs: All labs within the past 24 hours have been reviewed    Pending Diagnostic Studies:       Procedure Component Value Units Date/Time    Specimen to Pathology [[id number]] Collected: 11/18/22 1436    Order Status: Sent Lab Status: In process Updated: 11/21/22 0915    Specimen: Tissue from Gallbladder           There are no hospital problems to display for this patient.     Discharged Condition: good    Disposition: Home or Self Care    Follow Up:   Follow-up Information       Tito Palomino MD Follow up in 2 week(s).    Specialty: General Surgery  Why: FU 2 weeks in office with NP  Contact information:  [address]  [address]  [facility] [address]  [phone]               Tito Palomino MD Follow up.    Specialty: General Surgery  Why: you will need to call the office on Monday to schedule your follow up appointment.   the office was closed on friday afternoon  Contact information:  [address]  [address]  [facility] [address]  [phone]                            Patient Instructions:   No discharge procedures on file.  Medications:  Reconciled Home Medications:      Medication List        ASK your doctor about these medications      aspirin 81 MG EC tablet  Commonly known as: ECOTRIN  Take 81 mg by mouth once daily.     atenoloL 50 MG tablet  Commonly known as: TENORMIN  Take 1 tablet (50 mg total) by mouth 2 (two) times a day.     cholecalciferol (vitamin D3) 25 mcg (1,000 unit) capsule  Commonly known as: VITAMIN D3  Take 1,000 Units by mouth once daily.     ciprofloxacin HCl 500 MG tablet  Commonly known as: CIPRO  Take 500 mg by mouth 2 (two) times daily.     citalopram 20 MG tablet  Commonly known as: CeleXA  TAKE 1 AND 1/2 TABLETS BY MOUTH EVERY DAY     cyanocobalamin (vitamin B-12) 50 mcg tablet  Take 50 mcg by mouth.     dexlansoprazole 60 mg capsule  Commonly known as: DEXILANT  Dexilant 60 mg capsule, delayed release     diazePAM 10 MG Tab  Commonly known as: VALIUM  TAKE ONE (1) TABLET BY MOUTH EVERY EIGHT (8) HOURS AS NEEDED FOR ANXIETY     dicyclomine 20 mg tablet  Commonly known as: BENTYL  Take 20 mg by mouth once daily at 6am.     fenofibrate micronized 200 MG Cap  Commonly known as: LOFIBRA  Take 200 mg by mouth daily with breakfast.     hydrALAZINE 25 MG tablet  Commonly known as: APRESOLINE  25 mg every 8 (eight) hours.     HYDROcodone-acetaminophen  mg per tablet  Commonly known as: NORCO  Take 1 tablet by mouth every 6 (six) hours as needed for Pain.     icosapent ethyL 1 gram Cap  Commonly known as: VASCEPA  Take 2 capsules (2 g total) by mouth 2 (two) times a day.     isosorbide mononitrate 30 MG 24 hr tablet  Commonly known as: IMDUR  Take 30 mg by mouth.     lysine 1,000 mg Tab  Take 1 tablet by mouth once daily.     niacin 500 MG Cpsr  Take 250 mg by mouth every evening.     nitroGLYCERIN 0.4 MG SL tablet  Commonly known as: NITROSTAT  Nitrostat 0.4 mg sublingual tablet   Place by sublingual route.     potassium gluconate 595 mg  (99 mg) Tab  Take 1 tablet by mouth once.     rosuvastatin 20 MG tablet  Commonly known as: CRESTOR  rosuvastatin 20 mg tablet     triamcinolone acetonide 0.1% 0.1 % cream  Commonly known as: KENALOG  Apply topically.     XARELTO 2.5 mg Tab  Generic drug: rivaroxaban  Take 2.5 mg by mouth 2 (two) times daily.              Tito Palomino MD  General Surgery  [facility]

## 2022-11-23 LAB
ESTROGEN SERPL-MCNC: NORMAL PG/ML
INSULIN SERPL-ACNC: NORMAL U[IU]/ML
LAB AP CLINICAL INFORMATION: NORMAL
LAB AP GROSS DESCRIPTION: NORMAL
LAB AP REPORT FOOTNOTES: NORMAL
T3RU NFR SERPL: NORMAL %

## 2023-01-13 PROBLEM — E55.9 VITAMIN D DEFICIENCY: Status: ACTIVE | Noted: 2023-01-13

## 2023-02-08 ENCOUNTER — LAB VISIT (OUTPATIENT)
Dept: LAB | Facility: HOSPITAL | Age: 78
End: 2023-02-08
Attending: INTERNAL MEDICINE
Payer: MEDICARE

## 2023-02-08 DIAGNOSIS — E78.5 HYPERLIPIDEMIA, UNSPECIFIED HYPERLIPIDEMIA TYPE: ICD-10-CM

## 2023-02-08 DIAGNOSIS — I10 ESSENTIAL HYPERTENSION, MALIGNANT: Primary | ICD-10-CM

## 2023-02-08 LAB
ALBUMIN SERPL-MCNC: 3.6 G/DL (ref 3.4–4.8)
ALP SERPL-CCNC: 69 UNIT/L (ref 40–150)
ALT SERPL-CCNC: 22 UNIT/L (ref 0–55)
AST SERPL-CCNC: 29 UNIT/L (ref 5–34)
BILIRUBIN DIRECT+TOT PNL SERPL-MCNC: 0.4 MG/DL (ref 0–0.8)
BILIRUBIN DIRECT+TOT PNL SERPL-MCNC: 0.4 MG/DL (ref 0–?)
BILIRUBIN DIRECT+TOT PNL SERPL-MCNC: 0.8 MG/DL
CHOLEST SERPL-MCNC: 122 MG/DL
CHOLEST/HDLC SERPL: 4 {RATIO} (ref 0–5)
HDLC SERPL-MCNC: 33 MG/DL (ref 35–60)
LDLC SERPL CALC-MCNC: 69 MG/DL (ref 50–140)
PROT SERPL-MCNC: 6.8 GM/DL (ref 5.8–7.6)
TRIGL SERPL-MCNC: 101 MG/DL (ref 34–140)
VLDLC SERPL CALC-MCNC: 20 MG/DL

## 2023-02-08 PROCEDURE — 36415 COLL VENOUS BLD VENIPUNCTURE: CPT

## 2023-02-08 PROCEDURE — 80061 LIPID PANEL: CPT

## 2023-02-08 PROCEDURE — 80076 HEPATIC FUNCTION PANEL: CPT

## 2023-03-01 PROBLEM — I70.0 AORTIC ATHEROSCLEROSIS: Status: ACTIVE | Noted: 2023-03-01

## 2023-03-01 PROBLEM — E21.3 HYPERPARATHYROIDISM, UNSPECIFIED: Status: ACTIVE | Noted: 2023-03-01

## 2023-03-01 PROBLEM — E66.01 MORBID OBESITY: Status: ACTIVE | Noted: 2023-03-01

## 2023-03-01 PROBLEM — N18.31 STAGE 3A CHRONIC KIDNEY DISEASE: Status: ACTIVE | Noted: 2023-03-01

## 2023-03-01 PROBLEM — C61 MALIGNANT NEOPLASM OF PROSTATE: Status: ACTIVE | Noted: 2023-03-01

## 2023-03-01 PROBLEM — J84.112 UIP (USUAL INTERSTITIAL PNEUMONITIS): Status: ACTIVE | Noted: 2023-03-01

## 2023-04-03 PROBLEM — F41.9 ANXIETY AND DEPRESSION: Status: ACTIVE | Noted: 2018-04-02

## 2023-04-03 PROBLEM — E66.09 CLASS 1 OBESITY DUE TO EXCESS CALORIES WITH SERIOUS COMORBIDITY AND BODY MASS INDEX (BMI) OF 33.0 TO 33.9 IN ADULT: Status: ACTIVE | Noted: 2023-04-03

## 2023-04-03 PROBLEM — E66.811 CLASS 1 OBESITY DUE TO EXCESS CALORIES WITH SERIOUS COMORBIDITY AND BODY MASS INDEX (BMI) OF 33.0 TO 33.9 IN ADULT: Status: ACTIVE | Noted: 2023-04-03

## 2023-04-03 PROBLEM — E66.9 OBESITY (BMI 35.0-39.9 WITHOUT COMORBIDITY): Status: RESOLVED | Noted: 2022-09-27 | Resolved: 2023-04-03

## 2023-04-03 PROBLEM — Z85.46 HISTORY OF PROSTATE CANCER: Status: ACTIVE | Noted: 2023-04-03

## 2023-04-03 PROBLEM — Z00.00 MEDICARE ANNUAL WELLNESS VISIT, SUBSEQUENT: Status: ACTIVE | Noted: 2023-04-03

## 2023-06-28 PROCEDURE — 83880 ASSAY OF NATRIURETIC PEPTIDE: CPT | Performed by: NURSE PRACTITIONER

## 2023-07-03 PROBLEM — Z00.00 MEDICARE ANNUAL WELLNESS VISIT, SUBSEQUENT: Status: RESOLVED | Noted: 2023-04-03 | Resolved: 2023-07-03

## 2023-10-24 ENCOUNTER — HOSPITAL ENCOUNTER (EMERGENCY)
Facility: HOSPITAL | Age: 78
Discharge: HOME | End: 2023-10-24
Attending: EMERGENCY MEDICINE
Payer: MEDICARE

## 2023-10-24 VITALS
SYSTOLIC BLOOD PRESSURE: 162 MMHG | TEMPERATURE: 98 F | DIASTOLIC BLOOD PRESSURE: 78 MMHG | WEIGHT: 232 LBS | OXYGEN SATURATION: 95 % | HEART RATE: 63 BPM | RESPIRATION RATE: 18 BRPM | HEIGHT: 68 IN | BODY MASS INDEX: 35.16 KG/M2

## 2023-10-24 DIAGNOSIS — M54.9 BACK PAIN: ICD-10-CM

## 2023-10-24 DIAGNOSIS — R07.9 CHEST PAIN: ICD-10-CM

## 2023-10-24 DIAGNOSIS — M54.6 ACUTE BILATERAL THORACIC BACK PAIN: Primary | ICD-10-CM

## 2023-10-24 LAB
ALBUMIN SERPL-MCNC: 3.3 G/DL (ref 3.4–4.8)
ALBUMIN/GLOB SERPL: 1.1 RATIO (ref 1.1–2)
ALP SERPL-CCNC: 91 UNIT/L (ref 40–150)
ALT SERPL-CCNC: 24 UNIT/L (ref 0–55)
AST SERPL-CCNC: 32 UNIT/L (ref 5–34)
BASOPHILS # BLD AUTO: 0.03 X10(3)/MCL
BASOPHILS NFR BLD AUTO: 0.4 %
BILIRUB SERPL-MCNC: 0.6 MG/DL
BUN SERPL-MCNC: 19.5 MG/DL (ref 8.4–25.7)
CALCIUM SERPL-MCNC: 9.1 MG/DL (ref 8.8–10)
CHLORIDE SERPL-SCNC: 109 MMOL/L (ref 98–107)
CO2 SERPL-SCNC: 28 MMOL/L (ref 23–31)
CREAT SERPL-MCNC: 0.98 MG/DL (ref 0.73–1.18)
D DIMER PPP IA.FEU-MCNC: <0.19 UG/ML FEU (ref 0–0.5)
EOSINOPHIL # BLD AUTO: 0.22 X10(3)/MCL (ref 0–0.9)
EOSINOPHIL NFR BLD AUTO: 3 %
ERYTHROCYTE [DISTWIDTH] IN BLOOD BY AUTOMATED COUNT: 13.5 % (ref 11.5–17)
GFR SERPLBLD CREATININE-BSD FMLA CKD-EPI: >60 MLS/MIN/1.73/M2
GLOBULIN SER-MCNC: 3.1 GM/DL (ref 2.4–3.5)
GLUCOSE SERPL-MCNC: 98 MG/DL (ref 82–115)
HCT VFR BLD AUTO: 49.3 % (ref 42–52)
HGB BLD-MCNC: 15.5 G/DL (ref 14–18)
IMM GRANULOCYTES # BLD AUTO: 0.01 X10(3)/MCL (ref 0–0.04)
IMM GRANULOCYTES NFR BLD AUTO: 0.1 %
LYMPHOCYTES # BLD AUTO: 1.67 X10(3)/MCL (ref 0.6–4.6)
LYMPHOCYTES NFR BLD AUTO: 22.9 %
MCH RBC QN AUTO: 27.8 PG (ref 27–31)
MCHC RBC AUTO-ENTMCNC: 31.4 G/DL (ref 33–36)
MCV RBC AUTO: 88.5 FL (ref 80–94)
MONOCYTES # BLD AUTO: 0.65 X10(3)/MCL (ref 0.1–1.3)
MONOCYTES NFR BLD AUTO: 8.9 %
NEUTROPHILS # BLD AUTO: 4.72 X10(3)/MCL (ref 2.1–9.2)
NEUTROPHILS NFR BLD AUTO: 64.7 %
PLATELET # BLD AUTO: 124 X10(3)/MCL (ref 130–400)
PMV BLD AUTO: 9.1 FL (ref 7.4–10.4)
POC CARDIAC TROPONIN I: 0 NG/ML (ref 0–0.08)
POC CARDIAC TROPONIN I: 0.01 NG/ML (ref 0–0.08)
POTASSIUM SERPL-SCNC: 4.5 MMOL/L (ref 3.5–5.1)
PROT SERPL-MCNC: 6.4 GM/DL (ref 5.8–7.6)
RBC # BLD AUTO: 5.57 X10(6)/MCL (ref 4.7–6.1)
SAMPLE: NORMAL
SAMPLE: NORMAL
SODIUM SERPL-SCNC: 142 MMOL/L (ref 136–145)
WBC # SPEC AUTO: 7.3 X10(3)/MCL (ref 4.5–11.5)

## 2023-10-24 PROCEDURE — 85379 FIBRIN DEGRADATION QUANT: CPT | Performed by: EMERGENCY MEDICINE

## 2023-10-24 PROCEDURE — 93005 ELECTROCARDIOGRAM TRACING: CPT

## 2023-10-24 PROCEDURE — 85025 COMPLETE CBC W/AUTO DIFF WBC: CPT | Performed by: EMERGENCY MEDICINE

## 2023-10-24 PROCEDURE — 93010 EKG 12-LEAD: ICD-10-PCS | Mod: ,,, | Performed by: INTERNAL MEDICINE

## 2023-10-24 PROCEDURE — 84484 ASSAY OF TROPONIN QUANT: CPT | Mod: 91

## 2023-10-24 PROCEDURE — 99285 EMERGENCY DEPT VISIT HI MDM: CPT | Mod: 25

## 2023-10-24 PROCEDURE — 80053 COMPREHEN METABOLIC PANEL: CPT | Performed by: EMERGENCY MEDICINE

## 2023-10-24 PROCEDURE — 93010 ELECTROCARDIOGRAM REPORT: CPT | Mod: ,,, | Performed by: INTERNAL MEDICINE

## 2023-10-24 NOTE — ED PROVIDER NOTES
Encounter Date: 10/24/2023       History     Chief Complaint   Patient presents with    Back Pain     Upper back pain between shoulder blades today --     This 78-year-old man presents to the emergency room with complaints of back pain between his shoulder blades and chest pain which he describes as somebody sitting on his chest.  He states the symptoms started 2 hours prior to arrival.  He reports a history of myocardial infarction and stents x 6.  He took a hydrocodone but gotten no relief.  Risk factors also include hyperlipidemia.       Review of patient's allergies indicates:   Allergen Reactions    Amlodipine Itching     Other reaction(s): Propensity to adverse reactions to drug    Atorvastatin      Other reaction(s): body aches    Metoprolol      Other reaction(s): body aches    Morphine Itching     Other reaction(s): Hallucinations    Ranitidine      Other reaction(s): Propensity to adverse reactions to drug    Grass pollen-june grass standard Rash     Past Medical History:   Diagnosis Date    Arthritis     Heart attack     High cholesterol     Hypertension     Sleep apnea     wears CPAP     Past Surgical History:   Procedure Laterality Date    ANGIOGRAPHY      BACK SURGERY      x 2    COLONOSCOPY  06/01/2016    coronary stents      6  stents    FLUOROSCOPY      GANGLION CYST EXCISION      HERNIA REPAIR      LAPAROSCOPIC CHOLECYSTECTOMY N/A 11/18/2022    Procedure: CHOLECYSTECTOMY, LAPAROSCOPIC;  Surgeon: Tiot Palomino MD;  Location: [facility];  Service: General;  Laterality: N/A;    NECK SURGERY      plates /screws    PENILE PROSTHESIS IMPLANT      PROSTATECTOMY      ROTATOR CUFF REPAIR Left     TONSILLECTOMY      TOTAL HIP ARTHROPLASTY Bilateral     TOTAL KNEE ARTHROPLASTY Bilateral 12/17/2018     Family History   Problem Relation Age of Onset    Hyperlipidemia Mother     Heart failure Mother     Hyperlipidemia Father     Heart attack Sister     Coronary artery disease Sister     Cancer Brother       Social History     Tobacco Use    Smoking status: Never    Smokeless tobacco: Never   Substance Use Topics    Alcohol use: Not Currently    Drug use: Never     Review of Systems   Constitutional:  Negative for diaphoresis.   Respiratory:  Positive for shortness of breath (chronic since covid).    Cardiovascular:  Positive for chest pain.   Gastrointestinal:  Negative for nausea and vomiting.   Musculoskeletal:  Positive for back pain.   Neurological:  Positive for numbness (Of the legs has US planned for tomorrow to r/o arterial insufficiency).       Physical Exam     Initial Vitals [10/24/23 1150]   BP Pulse Resp Temp SpO2   (!) 168/72 70 18 98.3 °F (36.8 °C) 96 %      MAP       --         Physical Exam    Constitutional: He appears well-developed and well-nourished.   HENT:   Head: Normocephalic and atraumatic.   Mouth/Throat: Mucous membranes are normal.   Eyes: EOM are normal. Pupils are equal, round, and reactive to light.   Neck: Neck supple.   Normal range of motion.  Cardiovascular:  Normal rate, regular rhythm, normal heart sounds and intact distal pulses.           Pulmonary/Chest: Breath sounds normal.   Abdominal: Abdomen is soft. Bowel sounds are normal.   Musculoskeletal:         General: Tenderness (Reproducible tenderness to palpation in paraspinal muscles) present. Normal range of motion.      Cervical back: Normal range of motion and neck supple.     Neurological: He is alert and oriented to person, place, and time. He has normal strength.   Skin: Skin is warm and dry. Capillary refill takes less than 2 seconds.   Psychiatric: He has a normal mood and affect. His behavior is normal. Judgment and thought content normal.         ED Course   Procedures  Labs Reviewed   COMPREHENSIVE METABOLIC PANEL - Abnormal; Notable for the following components:       Result Value    Chloride 109 (*)     Albumin Level 3.3 (*)     All other components within normal limits   CBC WITH DIFFERENTIAL - Abnormal;  Notable for the following components:    MCHC 31.4 (*)     Platelet 124 (*)     All other components within normal limits   D DIMER, QUANTITATIVE - Normal   CBC W/ AUTO DIFFERENTIAL    Narrative:     The following orders were created for panel order CBC auto differential.  Procedure                               Abnormality         Status                     ---------                               -----------         ------                     CBC with Differential[[id number]]       Abnormal            Final result                 Please view results for these tests on the individual orders.   TROPONIN ISTAT   TROPONIN ISTAT   POCT TROPONIN   POCT TROPONIN     EKG Readings: (Independently Interpreted)   Rhythm: Normal Sinus Rhythm. Heart Rate: 70. Ectopy: No Ectopy. Conduction: Normal. ST Segments: Normal ST Segments. T Waves: Normal. Axis: Normal.   10/24/23 1143       Imaging Results              X-Ray Chest PA And Lateral (Final result)  Result time 10/24/23 13:17:14      Final result by Thad Amador MD (10/24/23 13:17:14)                   Impression:      No acute findings identified.      Electronically signed by: Thad Amador  Date:    10/24/2023  Time:    13:17               Narrative:    EXAMINATION:  XR CHEST PA AND LATERAL    CLINICAL HISTORY:  Chest pain, unspecified    TECHNIQUE:  Two views    COMPARISON:  November 17, 2022.    FINDINGS:  Cardiopericardial silhouette is within normal limits.  Lungs chronic atelectasis or scarring involving the right mid lung zone and left lower lung lobe are without significant interval change in overall appearance.  No overt edema, dense consolidation or significant fluid within the pleural spaces.  No pneumothorax.                                       Medications - No data to display  Medical Decision Making  This 78-year-old man presents to the emergency room with complaints of back pain between his shoulder blades and chest pain which he describes as somebody  sitting on his chest.  He states the symptoms started 2 hours prior to arrival.  He reports a history of myocardial infarction and stents x 6.  He took a hydrocodone but gotten no relief.  Risk factors also include hyperlipidemia.     Amount and/or Complexity of Data Reviewed  Labs: ordered. Decision-making details documented in ED Course.  Radiology: ordered. Decision-making details documented in ED Course.  Discussion of management or test interpretation with external provider(s): Patient has reproducible pain on palpation of his paraspinal muscles of the upper back in addition his cardiac enzymes 2 sets are negative.  I think he can be discharged safely                               Clinical Impression:   Final diagnoses:  [M54.9] Back pain  [R07.9] Chest pain  [M54.6] Acute bilateral thoracic back pain (Primary)        ED Disposition Condition    Discharge Stable          ED Prescriptions    None       Follow-up Information       Follow up With Specialties Details Why Contact Info    Johny Griffith MD Family Medicine Schedule an appointment as soon as possible for a visit   [address] [facility] [address]  [phone]               Arslan Hernandez MD  10/24/23 3638

## 2023-12-11 ENCOUNTER — LAB VISIT (OUTPATIENT)
Dept: LAB | Facility: HOSPITAL | Age: 78
End: 2023-12-11
Attending: INTERNAL MEDICINE
Payer: MEDICARE

## 2023-12-11 DIAGNOSIS — N18.9 ANEMIA OF CHRONIC RENAL FAILURE, UNSPECIFIED CKD STAGE: Primary | ICD-10-CM

## 2023-12-11 DIAGNOSIS — E87.8 DILATED CARDIOMYOPATHY SECONDARY TO ELECTROLYTE DEFICIENCY: ICD-10-CM

## 2023-12-11 DIAGNOSIS — E21.3 HYPERPARATHYROIDISM, UNSPECIFIED: ICD-10-CM

## 2023-12-11 DIAGNOSIS — N39.0 URINARY TRACT INFECTION, SITE NOT SPECIFIED: ICD-10-CM

## 2023-12-11 DIAGNOSIS — N18.2 CHRONIC KIDNEY DISEASE, STAGE II (MILD): ICD-10-CM

## 2023-12-11 DIAGNOSIS — R80.9 PROTEINURIA, UNSPECIFIED TYPE: ICD-10-CM

## 2023-12-11 DIAGNOSIS — I43 DILATED CARDIOMYOPATHY SECONDARY TO ELECTROLYTE DEFICIENCY: ICD-10-CM

## 2023-12-11 DIAGNOSIS — E55.9 AVITAMINOSIS D: ICD-10-CM

## 2023-12-11 DIAGNOSIS — D63.1 ANEMIA OF CHRONIC RENAL FAILURE, UNSPECIFIED CKD STAGE: Primary | ICD-10-CM

## 2023-12-11 LAB
ALBUMIN SERPL-MCNC: 3.5 G/DL (ref 3.4–4.8)
ALBUMIN/GLOB SERPL: 1.3 RATIO (ref 1.1–2)
ALP SERPL-CCNC: 91 UNIT/L (ref 40–150)
ALT SERPL-CCNC: 16 UNIT/L (ref 0–55)
APPEARANCE UR: CLEAR
AST SERPL-CCNC: 28 UNIT/L (ref 5–34)
BACTERIA #/AREA URNS AUTO: ABNORMAL /HPF
BILIRUB SERPL-MCNC: 0.5 MG/DL
BILIRUB UR QL STRIP.AUTO: NEGATIVE
BUN SERPL-MCNC: 22.2 MG/DL (ref 8.4–25.7)
CALCIUM SERPL-MCNC: 8.9 MG/DL (ref 8.8–10)
CHLORIDE SERPL-SCNC: 108 MMOL/L (ref 98–107)
CO2 SERPL-SCNC: 31 MMOL/L (ref 23–31)
COLOR UR AUTO: YELLOW
CREAT SERPL-MCNC: 1.09 MG/DL (ref 0.73–1.18)
CREAT UR-MCNC: 239.3 MG/DL (ref 63–166)
DEPRECATED CALCIDIOL+CALCIFEROL SERPL-MC: 48.9 NG/ML (ref 30–80)
ERYTHROCYTE [DISTWIDTH] IN BLOOD BY AUTOMATED COUNT: 14.7 % (ref 11.5–17)
GFR SERPLBLD CREATININE-BSD FMLA CKD-EPI: >60 MLS/MIN/1.73/M2
GLOBULIN SER-MCNC: 2.7 GM/DL (ref 2.4–3.5)
GLUCOSE SERPL-MCNC: 85 MG/DL (ref 82–115)
GLUCOSE UR QL STRIP.AUTO: NORMAL
HCT VFR BLD AUTO: 49.4 % (ref 42–52)
HGB BLD-MCNC: 15.6 G/DL (ref 14–18)
HYALINE CASTS #/AREA URNS LPF: ABNORMAL /LPF
KETONES UR QL STRIP.AUTO: NEGATIVE
LEUKOCYTE ESTERASE UR QL STRIP.AUTO: NEGATIVE
MAGNESIUM SERPL-MCNC: 2 MG/DL (ref 1.6–2.6)
MCH RBC QN AUTO: 27.9 PG (ref 27–31)
MCHC RBC AUTO-ENTMCNC: 31.6 G/DL (ref 33–36)
MCV RBC AUTO: 88.2 FL (ref 80–94)
MUCOUS THREADS URNS QL MICRO: ABNORMAL /LPF
NITRITE UR QL STRIP.AUTO: NEGATIVE
NRBC BLD AUTO-RTO: 0 %
PH UR STRIP.AUTO: 5.5 [PH]
PHOSPHATE SERPL-MCNC: 3.6 MG/DL (ref 2.3–4.7)
PLATELET # BLD AUTO: 135 X10(3)/MCL (ref 130–400)
PMV BLD AUTO: 9.3 FL (ref 7.4–10.4)
POTASSIUM SERPL-SCNC: 4.5 MMOL/L (ref 3.5–5.1)
PROT SERPL-MCNC: 6.2 GM/DL (ref 5.8–7.6)
PROT UR QL STRIP.AUTO: ABNORMAL
PROT UR STRIP-MCNC: 41.2 MG/DL
PTH-INTACT SERPL-MCNC: 42.5 PG/ML (ref 8.7–77)
RBC # BLD AUTO: 5.6 X10(6)/MCL (ref 4.7–6.1)
RBC #/AREA URNS AUTO: ABNORMAL /HPF
RBC UR QL AUTO: NEGATIVE
SODIUM SERPL-SCNC: 144 MMOL/L (ref 136–145)
SP GR UR STRIP.AUTO: 1.05 (ref 1–1.03)
SQUAMOUS #/AREA URNS LPF: ABNORMAL /HPF
TSH SERPL-ACNC: 2.69 UIU/ML (ref 0.35–4.94)
URINE PROTEIN/CREATININE RATIO (OHS): 0.2
UROBILINOGEN UR STRIP-ACNC: 2
WBC # SPEC AUTO: 6.48 X10(3)/MCL (ref 4.5–11.5)
WBC #/AREA URNS AUTO: ABNORMAL /HPF

## 2023-12-11 PROCEDURE — 85027 COMPLETE CBC AUTOMATED: CPT

## 2023-12-11 PROCEDURE — 83735 ASSAY OF MAGNESIUM: CPT

## 2023-12-11 PROCEDURE — 82570 ASSAY OF URINE CREATININE: CPT

## 2023-12-11 PROCEDURE — 36415 COLL VENOUS BLD VENIPUNCTURE: CPT

## 2023-12-11 PROCEDURE — 84100 ASSAY OF PHOSPHORUS: CPT

## 2023-12-11 PROCEDURE — 81001 URINALYSIS AUTO W/SCOPE: CPT

## 2023-12-11 PROCEDURE — 80053 COMPREHEN METABOLIC PANEL: CPT

## 2023-12-11 PROCEDURE — 83970 ASSAY OF PARATHORMONE: CPT

## 2023-12-11 PROCEDURE — 84443 ASSAY THYROID STIM HORMONE: CPT

## 2023-12-11 PROCEDURE — 82306 VITAMIN D 25 HYDROXY: CPT

## 2024-03-08 ENCOUNTER — HOSPITAL ENCOUNTER (INPATIENT)
Facility: HOSPITAL | Age: 79
LOS: 2 days | Discharge: HOME | DRG: 313 | End: 2024-03-10
Attending: EMERGENCY MEDICINE | Admitting: INTERNAL MEDICINE
Payer: MEDICARE

## 2024-03-08 DIAGNOSIS — F41.1 GAD (GENERALIZED ANXIETY DISORDER): ICD-10-CM

## 2024-03-08 DIAGNOSIS — R07.9 CHEST PAIN: ICD-10-CM

## 2024-03-08 DIAGNOSIS — R29.90 STROKE-LIKE SYMPTOMS: Primary | ICD-10-CM

## 2024-03-08 PROBLEM — R29.898 LEFT ARM WEAKNESS: Status: ACTIVE | Noted: 2024-03-08

## 2024-03-08 LAB
ALBUMIN SERPL-MCNC: 3.6 G/DL (ref 3.4–4.8)
ALBUMIN/GLOB SERPL: 1.2 RATIO (ref 1.1–2)
ALP SERPL-CCNC: 84 UNIT/L (ref 40–150)
ALT SERPL-CCNC: 28 UNIT/L (ref 0–55)
AST SERPL-CCNC: 44 UNIT/L (ref 5–34)
AV INDEX (PROSTH): 0.85
AV MEAN GRADIENT: 4 MMHG
AV PEAK GRADIENT: 8 MMHG
AV VALVE AREA BY VELOCITY RATIO: 2.83 CM²
AV VALVE AREA: 2.95 CM²
AV VELOCITY RATIO: 0.82
BASOPHILS # BLD AUTO: 0.04 X10(3)/MCL
BASOPHILS NFR BLD AUTO: 0.7 %
BILIRUB SERPL-MCNC: 0.8 MG/DL
BNP BLD-MCNC: 15.2 PG/ML
BSA FOR ECHO PROCEDURE: 2.19 M2
BUN SERPL-MCNC: 23.7 MG/DL (ref 8.4–25.7)
CALCIUM SERPL-MCNC: 8.9 MG/DL (ref 8.8–10)
CHLORIDE SERPL-SCNC: 113 MMOL/L (ref 98–107)
CO2 SERPL-SCNC: 21 MMOL/L (ref 23–31)
CREAT SERPL-MCNC: 0.93 MG/DL (ref 0.73–1.18)
CV ECHO LV RWT: 0.62 CM
DOP CALC AO PEAK VEL: 1.37 M/S
DOP CALC AO VTI: 28.9 CM
DOP CALC LVOT AREA: 3.5 CM2
DOP CALC LVOT DIAMETER: 2.1 CM
DOP CALC LVOT PEAK VEL: 1.12 M/S
DOP CALC LVOT STROKE VOLUME: 85.16 CM3
DOP CALC MV VTI: 19.2 CM
DOP CALCLVOT PEAK VEL VTI: 24.6 CM
E WAVE DECELERATION TIME: 309 MSEC
E/A RATIO: 0.55
E/E' RATIO: 4.71 M/S
ECHO LV POSTERIOR WALL: 1.4 CM (ref 0.6–1.1)
EOSINOPHIL # BLD AUTO: 0.1 X10(3)/MCL (ref 0–0.9)
EOSINOPHIL NFR BLD AUTO: 1.7 %
ERYTHROCYTE [DISTWIDTH] IN BLOOD BY AUTOMATED COUNT: 14.6 % (ref 11.5–17)
FRACTIONAL SHORTENING: 31 % (ref 28–44)
GFR SERPLBLD CREATININE-BSD FMLA CKD-EPI: >60 MLS/MIN/1.73/M2
GLOBULIN SER-MCNC: 3.1 GM/DL (ref 2.4–3.5)
GLUCOSE SERPL-MCNC: 116 MG/DL (ref 82–115)
HCT VFR BLD AUTO: 49 % (ref 42–52)
HGB BLD-MCNC: 16.8 G/DL (ref 14–18)
IMM GRANULOCYTES # BLD AUTO: 0.03 X10(3)/MCL (ref 0–0.04)
IMM GRANULOCYTES NFR BLD AUTO: 0.5 %
INTERVENTRICULAR SEPTUM: 1.4 CM (ref 0.6–1.1)
LEFT ATRIUM SIZE: 3.7 CM
LEFT ATRIUM VOLUME INDEX MOD: 20.9 ML/M2
LEFT ATRIUM VOLUME MOD: 44.6 CM3
LEFT INTERNAL DIMENSION IN SYSTOLE: 3.1 CM (ref 2.1–4)
LEFT VENTRICLE DIASTOLIC VOLUME INDEX: 43.38 ML/M2
LEFT VENTRICLE DIASTOLIC VOLUME: 92.4 ML
LEFT VENTRICLE MASS INDEX: 117 G/M2
LEFT VENTRICLE SYSTOLIC VOLUME INDEX: 17.8 ML/M2
LEFT VENTRICLE SYSTOLIC VOLUME: 37.9 ML
LEFT VENTRICULAR INTERNAL DIMENSION IN DIASTOLE: 4.5 CM (ref 3.5–6)
LEFT VENTRICULAR MASS: 248.45 G
LV LATERAL E/E' RATIO: 3.64 M/S
LV SEPTAL E/E' RATIO: 6.67 M/S
LVOT MG: 3 MMHG
LVOT MV: 0.85 CM/S
LYMPHOCYTES # BLD AUTO: 1.51 X10(3)/MCL (ref 0.6–4.6)
LYMPHOCYTES NFR BLD AUTO: 26.3 %
MCH RBC QN AUTO: 29 PG (ref 27–31)
MCHC RBC AUTO-ENTMCNC: 34.3 G/DL (ref 33–36)
MCV RBC AUTO: 84.5 FL (ref 80–94)
MONOCYTES # BLD AUTO: 0.51 X10(3)/MCL (ref 0.1–1.3)
MONOCYTES NFR BLD AUTO: 8.9 %
MV MEAN GRADIENT: 2 MMHG
MV PEAK A VEL: 0.73 M/S
MV PEAK E VEL: 0.4 M/S
MV PEAK GRADIENT: 3 MMHG
MV VALVE AREA BY CONTINUITY EQUATION: 4.44 CM2
NEUTROPHILS # BLD AUTO: 3.56 X10(3)/MCL (ref 2.1–9.2)
NEUTROPHILS NFR BLD AUTO: 61.9 %
NRBC BLD AUTO-RTO: 0 %
OHS QRS DURATION: 94 MS
OHS QTC CALCULATION: 459 MS
PLATELET # BLD AUTO: 143 X10(3)/MCL (ref 130–400)
PMV BLD AUTO: 9.9 FL (ref 7.4–10.4)
POCT GLUCOSE: 153 MG/DL (ref 70–110)
POTASSIUM SERPL-SCNC: 4 MMOL/L (ref 3.5–5.1)
PROT SERPL-MCNC: 6.7 GM/DL (ref 5.8–7.6)
RA MAJOR: 4.5 CM
RA WIDTH: 3 CM
RBC # BLD AUTO: 5.8 X10(6)/MCL (ref 4.7–6.1)
RIGHT VENTRICULAR END-DIASTOLIC DIMENSION: 3.3 CM
SODIUM SERPL-SCNC: 141 MMOL/L (ref 136–145)
TDI LATERAL: 0.11 M/S
TDI SEPTAL: 0.06 M/S
TDI: 0.09 M/S
TROPONIN I SERPL-MCNC: 0.03 NG/ML (ref 0–0.04)
TROPONIN I SERPL-MCNC: <0.01 NG/ML (ref 0–0.04)
TROPONIN I SERPL-MCNC: <0.01 NG/ML (ref 0–0.04)
WBC # SPEC AUTO: 5.75 X10(3)/MCL (ref 4.5–11.5)
Z-SCORE OF LEFT VENTRICULAR DIMENSION IN END DIASTOLE: -4.11
Z-SCORE OF LEFT VENTRICULAR DIMENSION IN END SYSTOLE: -2.29

## 2024-03-08 PROCEDURE — 99223 1ST HOSP IP/OBS HIGH 75: CPT | Mod: ,,, | Performed by: PSYCHIATRY & NEUROLOGY

## 2024-03-08 PROCEDURE — 93005 ELECTROCARDIOGRAM TRACING: CPT

## 2024-03-08 PROCEDURE — 11000001 HC ACUTE MED/SURG PRIVATE ROOM

## 2024-03-08 PROCEDURE — 85025 COMPLETE CBC W/AUTO DIFF WBC: CPT | Performed by: EMERGENCY MEDICINE

## 2024-03-08 PROCEDURE — 84484 ASSAY OF TROPONIN QUANT: CPT | Performed by: EMERGENCY MEDICINE

## 2024-03-08 PROCEDURE — 63600175 PHARM REV CODE 636 W HCPCS: Performed by: NURSE PRACTITIONER

## 2024-03-08 PROCEDURE — 84484 ASSAY OF TROPONIN QUANT: CPT | Performed by: NURSE PRACTITIONER

## 2024-03-08 PROCEDURE — 25000003 PHARM REV CODE 250: Performed by: NURSE PRACTITIONER

## 2024-03-08 PROCEDURE — 93010 ELECTROCARDIOGRAM REPORT: CPT | Mod: ,,, | Performed by: INTERNAL MEDICINE

## 2024-03-08 PROCEDURE — 25000242 PHARM REV CODE 250 ALT 637 W/ HCPCS: Performed by: EMERGENCY MEDICINE

## 2024-03-08 PROCEDURE — 82962 GLUCOSE BLOOD TEST: CPT

## 2024-03-08 PROCEDURE — 21400001 HC TELEMETRY ROOM

## 2024-03-08 PROCEDURE — 25000003 PHARM REV CODE 250: Performed by: EMERGENCY MEDICINE

## 2024-03-08 PROCEDURE — 99285 EMERGENCY DEPT VISIT HI MDM: CPT | Mod: 25

## 2024-03-08 PROCEDURE — 83880 ASSAY OF NATRIURETIC PEPTIDE: CPT | Performed by: EMERGENCY MEDICINE

## 2024-03-08 PROCEDURE — 80053 COMPREHEN METABOLIC PANEL: CPT | Performed by: EMERGENCY MEDICINE

## 2024-03-08 PROCEDURE — 96360 HYDRATION IV INFUSION INIT: CPT

## 2024-03-08 RX ORDER — VORTIOXETINE 10 MG/1
10 TABLET, FILM COATED ORAL NIGHTLY
Status: ON HOLD | COMMUNITY
End: 2024-03-10 | Stop reason: HOSPADM

## 2024-03-08 RX ORDER — NITROGLYCERIN 0.4 MG/1
0.4 TABLET SUBLINGUAL EVERY 5 MIN PRN
Status: COMPLETED | OUTPATIENT
Start: 2024-03-08 | End: 2024-03-08

## 2024-03-08 RX ORDER — ACETAMINOPHEN 325 MG/1
650 TABLET ORAL EVERY 8 HOURS PRN
Status: DISCONTINUED | OUTPATIENT
Start: 2024-03-08 | End: 2024-03-10 | Stop reason: HOSPADM

## 2024-03-08 RX ORDER — FENOFIBRATE 145 MG/1
145 TABLET, FILM COATED ORAL DAILY
Status: DISCONTINUED | OUTPATIENT
Start: 2024-03-09 | End: 2024-03-10 | Stop reason: HOSPADM

## 2024-03-08 RX ORDER — NITROGLYCERIN 0.4 MG/1
0.4 TABLET SUBLINGUAL EVERY 5 MIN PRN
Status: DISCONTINUED | OUTPATIENT
Start: 2024-03-08 | End: 2024-03-10 | Stop reason: HOSPADM

## 2024-03-08 RX ORDER — ONDANSETRON HYDROCHLORIDE 2 MG/ML
4 INJECTION, SOLUTION INTRAVENOUS EVERY 8 HOURS PRN
Status: DISCONTINUED | OUTPATIENT
Start: 2024-03-08 | End: 2024-03-10 | Stop reason: HOSPADM

## 2024-03-08 RX ORDER — CARVEDILOL 3.12 MG/1
6.25 TABLET ORAL 2 TIMES DAILY WITH MEALS
Status: DISCONTINUED | OUTPATIENT
Start: 2024-03-08 | End: 2024-03-10 | Stop reason: HOSPADM

## 2024-03-08 RX ORDER — HYDRALAZINE HYDROCHLORIDE 25 MG/1
25 TABLET, FILM COATED ORAL EVERY 8 HOURS
Status: DISCONTINUED | OUTPATIENT
Start: 2024-03-08 | End: 2024-03-09

## 2024-03-08 RX ORDER — HYDRALAZINE HYDROCHLORIDE 20 MG/ML
10 INJECTION INTRAMUSCULAR; INTRAVENOUS EVERY 4 HOURS PRN
Status: DISCONTINUED | OUTPATIENT
Start: 2024-03-08 | End: 2024-03-10 | Stop reason: HOSPADM

## 2024-03-08 RX ORDER — EZETIMIBE 10 MG/1
10 TABLET ORAL NIGHTLY
Status: DISCONTINUED | OUTPATIENT
Start: 2024-03-08 | End: 2024-03-10 | Stop reason: HOSPADM

## 2024-03-08 RX ORDER — ENOXAPARIN SODIUM 100 MG/ML
40 INJECTION SUBCUTANEOUS EVERY 24 HOURS
Status: DISCONTINUED | OUTPATIENT
Start: 2024-03-08 | End: 2024-03-08

## 2024-03-08 RX ORDER — HYDRALAZINE HYDROCHLORIDE 25 MG/1
25 TABLET, FILM COATED ORAL
Status: COMPLETED | OUTPATIENT
Start: 2024-03-08 | End: 2024-03-08

## 2024-03-08 RX ORDER — ISOSORBIDE MONONITRATE 30 MG/1
30 TABLET, EXTENDED RELEASE ORAL DAILY
Status: DISCONTINUED | OUTPATIENT
Start: 2024-03-09 | End: 2024-03-10 | Stop reason: HOSPADM

## 2024-03-08 RX ORDER — ASPIRIN 81 MG/1
81 TABLET ORAL DAILY
Status: DISCONTINUED | OUTPATIENT
Start: 2024-03-09 | End: 2024-03-10 | Stop reason: HOSPADM

## 2024-03-08 RX ORDER — ACETAMINOPHEN 325 MG/1
650 TABLET ORAL EVERY 4 HOURS PRN
Status: DISCONTINUED | OUTPATIENT
Start: 2024-03-08 | End: 2024-03-10 | Stop reason: HOSPADM

## 2024-03-08 RX ORDER — ASPIRIN 325 MG
325 TABLET ORAL
Status: COMPLETED | OUTPATIENT
Start: 2024-03-08 | End: 2024-03-08

## 2024-03-08 RX ADMIN — ACETAMINOPHEN 650 MG: 325 TABLET, FILM COATED ORAL at 06:03

## 2024-03-08 RX ADMIN — SODIUM CHLORIDE 500 ML: 9 INJECTION, SOLUTION INTRAVENOUS at 11:03

## 2024-03-08 RX ADMIN — NITROGLYCERIN 0.4 MG: 0.4 TABLET, ORALLY DISINTEGRATING SUBLINGUAL at 10:03

## 2024-03-08 RX ADMIN — HYDRALAZINE HYDROCHLORIDE 25 MG: 25 TABLET, FILM COATED ORAL at 01:03

## 2024-03-08 RX ADMIN — EZETIMIBE 10 MG: 10 TABLET ORAL at 09:03

## 2024-03-08 RX ADMIN — ASPIRIN 325 MG ORAL TABLET 325 MG: 325 PILL ORAL at 10:03

## 2024-03-08 RX ADMIN — NITROGLYCERIN 0.4 MG: 0.4 TABLET, ORALLY DISINTEGRATING SUBLINGUAL at 11:03

## 2024-03-08 RX ADMIN — CARVEDILOL 6.25 MG: 3.12 TABLET, FILM COATED ORAL at 04:03

## 2024-03-08 RX ADMIN — HYDRALAZINE HYDROCHLORIDE 25 MG: 25 TABLET, FILM COATED ORAL at 09:03

## 2024-03-08 RX ADMIN — HYDRALAZINE HYDROCHLORIDE 10 MG: 20 INJECTION INTRAMUSCULAR; INTRAVENOUS at 05:03

## 2024-03-08 NOTE — HPI
78-year-old male with a past medical history of HTN, HLD, CAD, remote tobacco use, LUIS presented to ED on 03/08 for chest pain.  He reported his chest pain started about 2 weeks ago.  He describes this as constant ache, radiating down his left arm.  He also reported left-sided numbness to his head and left arm weakness.  CT head was negative for acute intracranial abnormalities.  Cardiology was consulted and recommending heparin drip but wanted clearance from Neurology.  Neurology was consulted for stroke workup.    He reports he has had chest pain for a few weeks.  He reports upon arrival to ED, someone noticed his left arm was weak.  He denies any weakness, reports he is at baseline neurologically.

## 2024-03-08 NOTE — SUBJECTIVE & OBJECTIVE
Past Medical History:   Diagnosis Date    Arthritis     Heart attack     High cholesterol     Hypertension     Sleep apnea     wears CPAP       Past Surgical History:   Procedure Laterality Date    ANGIOGRAPHY      BACK SURGERY      x 2    COLONOSCOPY  06/01/2016    coronary stents      6  stents    FLUOROSCOPY      GANGLION CYST EXCISION      HERNIA REPAIR      LAPAROSCOPIC CHOLECYSTECTOMY N/A 11/18/2022    Procedure: CHOLECYSTECTOMY, LAPAROSCOPIC;  Surgeon: Tito Palomino MD;  Location: [facility];  Service: General;  Laterality: N/A;    NECK SURGERY      plates /screws    PENILE PROSTHESIS IMPLANT      PROSTATECTOMY      ROTATOR CUFF REPAIR Left     TONSILLECTOMY      TOTAL HIP ARTHROPLASTY Bilateral     TOTAL KNEE ARTHROPLASTY Bilateral 12/17/2018       Review of patient's allergies indicates:   Allergen Reactions    Amlodipine Itching     Other reaction(s): Propensity to adverse reactions to drug    Atorvastatin      Other reaction(s): body aches    Metoprolol      Other reaction(s): body aches    Morphine Itching     Other reaction(s): Hallucinations    Ranitidine      Other reaction(s): Propensity to adverse reactions to drug    Grass pollen-darek grass standard Rash       Current Neurological Medications:     No current facility-administered medications on file prior to encounter.     Current Outpatient Medications on File Prior to Encounter   Medication Sig    butalbital-aspirin-caffeine -40 mg (FIORINAL) -40 mg Cap Take 1 capsule by mouth every 4 (four) hours as needed.    diazePAM (VALIUM) 10 MG Tab TAKE ONE (1) TABLET BY MOUTH EVERY EIGHT (8) HOURS AS NEEDED FOR ANXIETY (Patient taking differently: Take 10 mg by mouth as needed (at bedtime).)    ezetimibe (ZETIA) 10 mg tablet Take 1 tablet by mouth Daily.    hydrALAZINE (APRESOLINE) 25 MG tablet TAKE TWO (2) TABLETS BY MOUTH (3) TIMES DAILY (Patient taking differently: Take 25 mg by mouth 3 (three) times daily.)    HYDROcodone-acetaminophen  (NORCO)  mg per tablet Take 1 tablet by mouth every 6 (six) hours as needed for Pain.    isosorbide mononitrate (IMDUR) 30 MG 24 hr tablet Take 30 mg by mouth once daily.    potassium gluconate 595 mg (99 mg) Tab Take 1 tablet by mouth once.    rivaroxaban (XARELTO) 2.5 mg Tab Take 2.5 mg by mouth 2 (two) times daily.    vortioxetine (TRINTELLIX) 10 mg Tab Take 10 mg by mouth nightly.    amLODIPine (NORVASC) 5 MG tablet Take 1 tablet by mouth Daily.    aspirin (ECOTRIN) 81 MG EC tablet Take 81 mg by mouth once daily.    buPROPion (WELLBUTRIN XL) 150 MG TB24 tablet Take 150 mg by mouth once daily.    cholecalciferol, vitamin D3, (VITAMIN D3) 25 mcg (1,000 unit) capsule Take 1,000 Units by mouth once daily.    cyanocobalamin, vitamin B-12, 50 mcg tablet Take 50 mcg by mouth.    DEXILANT 60 mg capsule TAKE ONE CAPSULE BY MOUTH EVERY DAY    fenofibrate micronized (LOFIBRA) 200 MG Cap Take 200 mg by mouth daily with breakfast.    icosapent ethyL (VASCEPA) 1 gram Cap TAKE 2 CAPSULES BY MOUTH 2 TIMES A DAY. (Patient not taking: Reported on 12/20/2023)    lysine 1,000 mg Tab Take 1 tablet by mouth once daily.    multivitamin with minerals tablet Take 1 tablet by mouth once daily.    niacin 500 MG CpSR Take 250 mg by mouth every evening.    nitroGLYCERIN (NITROSTAT) 0.4 MG SL tablet Nitrostat 0.4 mg sublingual tablet   Place by sublingual route.    rosuvastatin (CRESTOR) 40 MG Tab Take 1 tablet by mouth Daily.    sertraline (ZOLOFT) 50 MG tablet Take 1 tablet (50 mg total) by mouth once daily.    triamcinolone acetonide 0.1% (KENALOG) 0.1 % cream Apply topically.     Family History       Problem Relation (Age of Onset)    Cancer Brother    Coronary artery disease Sister    Heart attack Sister    Heart failure Mother    Hyperlipidemia Mother, Father          Tobacco Use    Smoking status: Never    Smokeless tobacco: Never   Substance and Sexual Activity    Alcohol use: Not Currently    Drug use: Never    Sexual  activity: Not Currently     Review of Systems   Cardiovascular:  Positive for chest pain.   Neurological:  Negative for dizziness, tremors, seizures, syncope, facial asymmetry, speech difficulty, weakness, light-headedness, numbness and headaches.   All other systems reviewed and are negative.    Objective:     Vital Signs (Most Recent):  Temp: 97.7 °F (36.5 °C) (03/08/24 1028)  Pulse: 74 (03/08/24 1358)  Resp: 18 (03/08/24 1358)  BP: (!) 161/78 (03/08/24 1358)  SpO2: (!) 93 % (03/08/24 1358) Vital Signs (24h Range):  Temp:  [97.7 °F (36.5 °C)] 97.7 °F (36.5 °C)  Pulse:  [68-80] 74  Resp:  [18-24] 18  SpO2:  [93 %-96 %] 93 %  BP: (108-175)/(65-87) 161/78     Weight: 99.8 kg (220 lb)  Body mass index is 33.45 kg/m².     Physical Exam  Vitals and nursing note reviewed.   Constitutional:       General: He is not in acute distress.     Appearance: Normal appearance. He is not toxic-appearing.   HENT:      Head: Normocephalic.   Eyes:      General: No visual field deficit.     Extraocular Movements:      Right eye: Normal extraocular motion and no nystagmus.      Left eye: Normal extraocular motion and no nystagmus.      Pupils: Pupils are equal, round, and reactive to light.   Cardiovascular:      Rate and Rhythm: Normal rate.   Pulmonary:      Effort: Pulmonary effort is normal.      Breath sounds: Normal breath sounds.   Musculoskeletal:         General: No swelling. Normal range of motion.      Cervical back: Normal range of motion.      Right lower leg: No edema.      Left lower leg: No edema.   Skin:     General: Skin is warm and dry.      Capillary Refill: Capillary refill takes less than 2 seconds.   Neurological:      General: No focal deficit present.      Mental Status: He is alert and oriented to person, place, and time.      Cranial Nerves: No dysarthria or facial asymmetry.      Sensory: Sensation is intact. No sensory deficit.      Motor: Motor function is intact. No weakness, tremor, atrophy, abnormal  muscle tone, seizure activity or pronator drift.      Coordination: Coordination normal. Finger-Nose-Finger Test normal.   Psychiatric:         Attention and Perception: Attention normal.         Mood and Affect: Affect normal.         Speech: Speech normal.         Behavior: Behavior normal.          NEUROLOGICAL EXAMINATION:     MENTAL STATUS   Oriented to person, place, and time.   Speech: speech is normal     CRANIAL NERVES     CN III, IV, VI   Pupils are equal, round, and reactive to light.    GAIT AND COORDINATION      Coordination   Finger to nose coordination: normal      Significant Labs:   Recent Lab Results         03/08/24  1355   03/08/24  1056   03/08/24  1024        Albumin/Globulin Ratio   1.2         Albumin   3.6         ALP   84         ALT   28         AST   44         Baso #   0.04         Basophil %   0.7         BILIRUBIN TOTAL   0.8         BNP   15.2         BUN   23.7         Calcium   8.9         Chloride   113         CO2   21         Creatinine   0.93         eGFR   >60         Eos #   0.10         Eos %   1.7         Globulin, Total   3.1         Glucose   116         Hematocrit   49.0         Hemoglobin   16.8         Immature Grans (Abs)   0.03         Immature Granulocytes   0.5         Lymph #   1.51         LYMPH %   26.3         MCH   29.0         MCHC   34.3         MCV   84.5         Mono #   0.51         Mono %   8.9         MPV   9.9         Neut #   3.56         Neut %   61.9         nRBC   0.0         QRS Duration     94       OHS QTC Calculation     459       Platelet Count   143         Potassium   4.0         PROTEIN TOTAL   6.7         RBC   5.80         RDW   14.6         Sodium   141         Troponin I <0.010   <0.010         WBC   5.75                 Significant Imaging: I have reviewed all pertinent imaging results/findings within the past 24 hours.

## 2024-03-08 NOTE — ED PROVIDER NOTES
Encounter Date: 3/8/2024    SCRIBE #1 NOTE: I, Kiya Ibrahim, am scribing for, and in the presence of,  Judah Shelley III, MD. I have scribed the following portions of the note - Other sections scribed: HPI, ROS, PE, MDM.       History     Chief Complaint   Patient presents with    Chest Pain     Pt. C/o intermittent CP x 2 weeks worsening today. [facility] Cardiac stents x 6, Cardiologist Dr. [place].      78 year old male with a history of 6 cardiac stents, MI, HTN, arthritis presents to ED complaining of chest pain that began 2 weeks ago. He says currently he feels a constant ache in his chest.  Pt is also complaining of left sided head numbness and left sided arm weakness .  He denies any N/V.  Denies any current weakness        Review of patient's allergies indicates:   Allergen Reactions    Amlodipine Itching     Other reaction(s): Propensity to adverse reactions to drug    Atorvastatin      Other reaction(s): body aches    Metoprolol      Other reaction(s): body aches    Morphine Itching     Other reaction(s): Hallucinations    Ranitidine      Other reaction(s): Propensity to adverse reactions to drug    Grass pollen-june grass standard Rash     Past Medical History:   Diagnosis Date    Arthritis     Heart attack     High cholesterol     Hypertension     Sleep apnea     wears CPAP     Past Surgical History:   Procedure Laterality Date    ANGIOGRAPHY      BACK SURGERY      x 2    COLONOSCOPY  06/01/2016    coronary stents      6  stents    FLUOROSCOPY      GANGLION CYST EXCISION      HERNIA REPAIR      LAPAROSCOPIC CHOLECYSTECTOMY N/A 11/18/2022    Procedure: CHOLECYSTECTOMY, LAPAROSCOPIC;  Surgeon: Tito Palomino MD;  Location: [facility];  Service: General;  Laterality: N/A;    NECK SURGERY      plates /screws    PENILE PROSTHESIS IMPLANT      PROSTATECTOMY      ROTATOR CUFF REPAIR Left     TONSILLECTOMY      TOTAL HIP ARTHROPLASTY Bilateral     TOTAL KNEE ARTHROPLASTY Bilateral 12/17/2018     Family  History   Problem Relation Age of Onset    Hyperlipidemia Mother     Heart failure Mother     Hyperlipidemia Father     Heart attack Sister     Coronary artery disease Sister     Cancer Brother      Social History     Tobacco Use    Smoking status: Never    Smokeless tobacco: Never   Substance Use Topics    Alcohol use: Not Currently    Drug use: Never     Review of Systems   Constitutional:  Negative for fatigue, fever and unexpected weight change.   HENT:  Negative for congestion and rhinorrhea.    Eyes:  Negative for pain.   Respiratory:  Negative for chest tightness, shortness of breath and wheezing.    Cardiovascular:  Positive for chest pain.   Gastrointestinal:  Negative for abdominal pain, constipation, diarrhea, nausea and vomiting.   Genitourinary:  Negative for dysuria.   Musculoskeletal:  Negative for back pain and neck pain.   Skin:  Negative for rash.   Allergic/Immunologic: Negative for environmental allergies, food allergies and immunocompromised state.   Neurological:  Positive for weakness and headaches. Negative for dizziness and speech difficulty.   Hematological:  Does not bruise/bleed easily.   Psychiatric/Behavioral:  Negative for sleep disturbance and suicidal ideas.        Physical Exam     Initial Vitals [03/08/24 1028]   BP Pulse Resp Temp SpO2   (!) 154/79 80 18 97.7 °F (36.5 °C) (!) 94 %      MAP       --         Physical Exam    Nursing note and vitals reviewed.  Constitutional: He appears distressed.   Anxious   HENT:   Head: Normocephalic and atraumatic.   Eyes: EOM are normal. Pupils are equal, round, and reactive to light.   Neck: Trachea normal. Neck supple.   Normal range of motion.  Cardiovascular:  Normal rate and regular rhythm.           No murmur heard.  Pulmonary/Chest: Breath sounds normal. No respiratory distress.   Abdominal: Abdomen is soft. Bowel sounds are normal. He exhibits no distension. There is no abdominal tenderness.   Musculoskeletal:         General: Normal  range of motion.      Cervical back: Normal range of motion and neck supple.      Lumbar back: Normal.     Neurological: He is alert and oriented to person, place, and time. He has normal strength. No cranial nerve deficit.   Tremulous    Skin: Skin is warm and dry. No rash noted.   Psychiatric: Judgment normal. His mood appears anxious.         ED Course   Procedures  Labs Reviewed   COMPREHENSIVE METABOLIC PANEL - Abnormal; Notable for the following components:       Result Value    Chloride 113 (*)     Carbon Dioxide 21 (*)     Glucose Level 116 (*)     Aspartate Aminotransferase 44 (*)     All other components within normal limits   TROPONIN I - Normal   B-TYPE NATRIURETIC PEPTIDE - Normal   CBC W/ AUTO DIFFERENTIAL    Narrative:     The following orders were created for panel order CBC auto differential.  Procedure                               Abnormality         Status                     ---------                               -----------         ------                     CBC with Differential[[id number]]                           Final result                 Please view results for these tests on the individual orders.   CBC WITH DIFFERENTIAL   TROPONIN I     EKG Readings: (Independently Interpreted)   Initial Reading: No STEMI. Rhythm: Normal Sinus Rhythm. Heart Rate: 80. Clinical Impression: Left Ventricular Hypertrophy (LDH)   Time: 1024       Imaging Results              CT Head Without Contrast (Final result)  Result time 03/08/24 12:43:03      Final result by Roddy Almanza MD (03/08/24 12:43:03)                   Impression:      No acute intracranial abnormality identified.      Electronically signed by: Roddy Almanza  Date:    03/08/2024  Time:    12:43               Narrative:    EXAMINATION:  CT HEAD WITHOUT CONTRAST    CLINICAL HISTORY:  Stroke, follow up;    TECHNIQUE:  Low dose axial images were obtained through the head.  Coronal and sagittal reformations were also performed. Contrast  was not administered.    Automatic exposure control was utilized to reduce the patient's radiation dose.    DLP= 933    COMPARISON:  None.    FINDINGS:  No acute intracranial hemorrhage, edema or mass. No acute parenchymal abnormality.    Mild cerebral atrophy with concordant ventricular enlargement.    There is normal gray white differentiation.    The osseous structures are normal.    The mastoid air cells are clear.    The auditory canals are patent bilaterally.    The globes and orbital contents are normal bilaterally.    The visualized maxillary, ethmoid and sphenoid sinuses are clear.                                       X-Ray Chest 1 View (Final result)  Result time 03/08/24 12:07:28      Final result by Roddy Almanza MD (03/08/24 12:07:28)                   Impression:      No acute cardiopulmonary process.  Findings of chronic lung disease.      Electronically signed by: Roddy Almanza  Date:    03/08/2024  Time:    12:07               Narrative:    EXAMINATION:  XR CHEST 1 VIEW    CLINICAL HISTORY:  weakness;    TECHNIQUE:  Single view of the chest    COMPARISON:  10/24/2023    FINDINGS:  Prominent interstitial markings with no focal opacification.  No pleural effusion or pneumothorax.    The cardiomediastinal silhouette is within normal limits.    No acute osseous abnormality.                                       Medications   sodium chloride 0.9% bolus 500 mL 500 mL (0 mLs Intravenous Stopped 3/8/24 1200)   aspirin tablet 325 mg (325 mg Oral Given 3/8/24 1059)   nitroGLYCERIN SL tablet 0.4 mg (0.4 mg Sublingual Given 3/8/24 1149)   hydrALAZINE tablet 25 mg (25 mg Oral Given 3/8/24 1320)     Medical Decision Making  Differential diagnosis includes but is not limited to CVA, ACS, unstable angina, electrolyte derangement, anemia, NSTEMI.    EKG cardiac enzymes without abnormality patient also complaining of intermittent stroke-like symptoms patient with no weakness on direct exam.  CT head without  abnormality discussed case with cardiology service will see in consult will start on aspirin patient did get 1 dose of nitroglycerin was some transient hypotension.  Discussed case with [place] with Hospital Medicine will admit    Problems Addressed:  Chest pain: complicated acute illness or injury that poses a threat to life or bodily functions  Stroke-like symptoms: complicated acute illness or injury that poses a threat to life or bodily functions    Amount and/or Complexity of Data Reviewed  Labs: ordered.  Radiology: ordered.  ECG/medicine tests: ordered and independent interpretation performed.     Details: Time: 1024, 80 bpm, LVH, no other ST abnormalities, no dysrhythmias.    Risk  OTC drugs.  Prescription drug management.            Scribe Attestation:   Scribe #1: I performed the above scribed service and the documentation accurately describes the services I performed. I attest to the accuracy of the note.    Attending Attestation:           Physician Attestation for Scribe:  Physician Attestation Statement for Scribe #1: I, Judah Shelley III, MD, reviewed documentation, as scribed by Kiya Ibrahim in my presence, and it is both accurate and complete.                                    Clinical Impression:  Final diagnoses:  [R07.9] Chest pain  [R29.90] Stroke-like symptoms (Primary)          ED Disposition Condition    Admit Stable                Judah Shelley III, MD  03/08/24 9437

## 2024-03-08 NOTE — CONSULTS
Inpatient consult to Cardiology  Consult performed by: Laura Christy FNP  Consult ordered by: Garima Green, AGACNP-BC  Reason for consult: Chest Pain        [facility] - Emergency Dept    Cardiology  Consult Note    Patient Name: Augusto Snow  MRN: [id number]  Admission Date: 3/8/2024  Hospital Length of Stay: 0 days  Code Status: Full Code   Attending Provider: Cammy Tam MD   Consulting Provider: ANDREA Smith  Primary Care Physician: Chanda Lloyd NP  Principal Problem:<principal problem not specified>    Patient information was obtained from patient, past medical records, ER records, and primary team.     Subjective:   Chief Complaint/Reason for Consult: Chest Pain    HPI:   Mr. Snow is a 78 year old male, known to Dr. Gotti in [place], who presented to the hospital with reports of CP. Symptoms reportedly began about 2 weeks ago. Constant pattern with aching sensation as description. Also reported left-sided head and arm numbness and weakness. ER lab work is unremarkable. Troponin is normal and BNP is normal. CT Head revealed no acute intracranial abnormality. Chest XR revealed no acute abnormal cardiopulmonary process. EKG revealed SR with no evidence of ST-Segment elevation or depression. Patient admitted to Hospital Medicine Team. CIS consulted for cardiac evaluation of his CP.    PMH: Hypertension, Hyperlipidemia, CAD/Stent, ASCVD, Remote Tobacco Use, GERD, LUIS/CPAP, Diverticulitis   PSH: Back Surgery, Colonoscopy, LHC/PCI, Hernia Repair, Prostatectomy, Tonsillectomy, Total Knee Replacement, Total Hip Replacement, Neck Surgery, Rotator Cuff Repair, Penile Prosthesis   Family History: Mother- Heart Disease, Sister- Heart Disease  Social History: Tobacco- Remote Use, Alcohol- Negative, Substance Abuse- Negative    Previous Cardiac Diagnostics:   Echocardiogram (5.22.23):  The study quality is average.   Global left ventricular systolic function is normal. The left  ventricular ejection fraction is 60-65%. The left ventricle diastolic function is impaired (Grade I) with normal left atrial pressure. Noted left ventricular hypertrophy. It is mild.   No obvious valvular dysfunction noted.    The pulmonary artery appears normal.     Cardiac PET (8.18.22):  This is a normal perfusion study, no perfusion defects noted. There is no evidence of ischemia.   This scan is suggestive of low risk for future cardiovascular events.   The left ventricular cavity is noted to be normal on the stress studies. The stress left ventricular ejection fraction was calculated to be 62% and left ventricular global function is normal. The rest left ventricular cavity is noted to be normal. The rest left ventricular ejection fraction was calculated to be 55% and rest left ventricular global function is normal.   When compared to the resting ejection fraction (55%), the stress ejection fraction (62%) has increased.   The study quality is good.     Carotid US (3.18.21):  The study quality is average.   1-39% stenosis in the proximal right internal carotid artery based on Bluth Criteria.   1-39% stenosis in the proximal left internal carotid artery based on Bluth Criteria.   Antegrade right vertebral artery flow.   Antegrade left vertebral artery flow.     Coronary Angiogram (3.9.16):  LM: Normal, LAD: Normal with Patent Mid Existing Stent, LCX: Normal with OM1 Proximal 30% Stenosis/Previous Stent in OM2 Widely Patent, RCA: Previous Stent Proximal Region Widely Patent.  FFR Proximal RCA- 0.8- PCI RCA     Review of patient's allergies indicates:   Allergen Reactions    Amlodipine Itching     Other reaction(s): Propensity to adverse reactions to drug    Atorvastatin      Other reaction(s): body aches    Metoprolol      Other reaction(s): body aches    Morphine Itching     Other reaction(s): Hallucinations    Ranitidine      Other reaction(s): Propensity to adverse reactions to drug    Grass pollen-june grass  standard Rash     No current facility-administered medications on file prior to encounter.     Current Outpatient Medications on File Prior to Encounter   Medication Sig    butalbital-aspirin-caffeine -40 mg (FIORINAL) -40 mg Cap Take 1 capsule by mouth every 4 (four) hours as needed.    diazePAM (VALIUM) 10 MG Tab TAKE ONE (1) TABLET BY MOUTH EVERY EIGHT (8) HOURS AS NEEDED FOR ANXIETY (Patient taking differently: Take 10 mg by mouth as needed (at bedtime).)    ezetimibe (ZETIA) 10 mg tablet Take 1 tablet by mouth Daily.    hydrALAZINE (APRESOLINE) 25 MG tablet TAKE TWO (2) TABLETS BY MOUTH (3) TIMES DAILY (Patient taking differently: Take 25 mg by mouth 3 (three) times daily.)    HYDROcodone-acetaminophen (NORCO)  mg per tablet Take 1 tablet by mouth every 6 (six) hours as needed for Pain.    isosorbide mononitrate (IMDUR) 30 MG 24 hr tablet Take 30 mg by mouth once daily.    potassium gluconate 595 mg (99 mg) Tab Take 1 tablet by mouth once.    rivaroxaban (XARELTO) 2.5 mg Tab Take 2.5 mg by mouth 2 (two) times daily.    vortioxetine (TRINTELLIX) 10 mg Tab Take 10 mg by mouth nightly.    amLODIPine (NORVASC) 5 MG tablet Take 1 tablet by mouth Daily.    aspirin (ECOTRIN) 81 MG EC tablet Take 81 mg by mouth once daily.    buPROPion (WELLBUTRIN XL) 150 MG TB24 tablet Take 150 mg by mouth once daily.    cholecalciferol, vitamin D3, (VITAMIN D3) 25 mcg (1,000 unit) capsule Take 1,000 Units by mouth once daily.    cyanocobalamin, vitamin B-12, 50 mcg tablet Take 50 mcg by mouth.    DEXILANT 60 mg capsule TAKE ONE CAPSULE BY MOUTH EVERY DAY    fenofibrate micronized (LOFIBRA) 200 MG Cap Take 200 mg by mouth daily with breakfast.    icosapent ethyL (VASCEPA) 1 gram Cap TAKE 2 CAPSULES BY MOUTH 2 TIMES A DAY. (Patient not taking: Reported on 12/20/2023)    lysine 1,000 mg Tab Take 1 tablet by mouth once daily.    multivitamin with minerals tablet Take 1 tablet by mouth once daily.    niacin 500 MG CpSR  Take 250 mg by mouth every evening.    nitroGLYCERIN (NITROSTAT) 0.4 MG SL tablet Nitrostat 0.4 mg sublingual tablet   Place by sublingual route.    rosuvastatin (CRESTOR) 40 MG Tab Take 1 tablet by mouth Daily.    sertraline (ZOLOFT) 50 MG tablet Take 1 tablet (50 mg total) by mouth once daily.    triamcinolone acetonide 0.1% (KENALOG) 0.1 % cream Apply topically.     Review of Systems   Respiratory:  Negative for shortness of breath.    Cardiovascular:  Positive for chest pain.   Gastrointestinal:  Negative for abdominal distention.   Neurological:  Positive for weakness.   All other systems reviewed and are negative.    Objective:     Vital Signs (Most Recent):  Temp: 97.7 °F (36.5 °C) (03/08/24 1028)  Pulse: 74 (03/08/24 1228)  Resp: 19 (03/08/24 1228)  BP: (!) 167/85 (03/08/24 1228)  SpO2: (!) 94 % (03/08/24 1228) Vital Signs (24h Range):  Temp:  [97.7 °F (36.5 °C)] 97.7 °F (36.5 °C)  Pulse:  [68-80] 74  Resp:  [18-24] 19  SpO2:  [93 %-95 %] 94 %  BP: (108-172)/(65-87) 167/85   Weight: 99.8 kg (220 lb)  Body mass index is 33.45 kg/m².  SpO2: (!) 94 %     No intake or output data in the 24 hours ending 03/08/24 1419  Lines/Drains/Airways       Peripheral Intravenous Line  Duration                  Peripheral IV - Single Lumen 03/08/24 1035 20 G Anterior;Left;Proximal Forearm <1 day                  Significant Labs:  Recent Results (from the past 72 hour(s))   Comprehensive metabolic panel    Collection Time: 03/08/24 10:56 AM   Result Value Ref Range    Sodium Level 141 136 - 145 mmol/L    Potassium Level 4.0 3.5 - 5.1 mmol/L    Chloride 113 (H) 98 - 107 mmol/L    Carbon Dioxide 21 (L) 23 - 31 mmol/L    Glucose Level 116 (H) 82 - 115 mg/dL    Blood Urea Nitrogen 23.7 8.4 - 25.7 mg/dL    Creatinine 0.93 0.73 - 1.18 mg/dL    Calcium Level Total 8.9 8.8 - 10.0 mg/dL    Protein Total 6.7 5.8 - 7.6 gm/dL    Albumin Level 3.6 3.4 - 4.8 g/dL    Globulin 3.1 2.4 - 3.5 gm/dL    Albumin/Globulin Ratio 1.2 1.1 - 2.0 ratio     Bilirubin Total 0.8 <=1.5 mg/dL    Alkaline Phosphatase 84 40 - 150 unit/L    Alanine Aminotransferase 28 0 - 55 unit/L    Aspartate Aminotransferase 44 (H) 5 - 34 unit/L    eGFR >60 mls/min/1.73/m2   Troponin I #1    Collection Time: 03/08/24 10:56 AM   Result Value Ref Range    Troponin-I <0.010 0.000 - 0.045 ng/mL   B-Type natriuretic peptide (BNP)    Collection Time: 03/08/24 10:56 AM   Result Value Ref Range    Natriuretic Peptide 15.2 <=100.0 pg/mL   CBC with Differential    Collection Time: 03/08/24 10:56 AM   Result Value Ref Range    WBC 5.75 4.50 - 11.50 x10(3)/mcL    RBC 5.80 4.70 - 6.10 x10(6)/mcL    Hgb 16.8 14.0 - 18.0 g/dL    Hct 49.0 42.0 - 52.0 %    MCV 84.5 80.0 - 94.0 fL    MCH 29.0 27.0 - 31.0 pg    MCHC 34.3 33.0 - 36.0 g/dL    RDW 14.6 11.5 - 17.0 %    Platelet 143 130 - 400 x10(3)/mcL    MPV 9.9 7.4 - 10.4 fL    Neut % 61.9 %    Lymph % 26.3 %    Mono % 8.9 %    Eos % 1.7 %    Basophil % 0.7 %    Lymph # 1.51 0.6 - 4.6 x10(3)/mcL    Neut # 3.56 2.1 - 9.2 x10(3)/mcL    Mono # 0.51 0.1 - 1.3 x10(3)/mcL    Eos # 0.10 0 - 0.9 x10(3)/mcL    Baso # 0.04 <=0.2 x10(3)/mcL    IG# 0.03 0 - 0.04 x10(3)/mcL    IG% 0.5 %    NRBC% 0.0 %     Significant Imaging:  Imaging Results              CT Head Without Contrast (Final result)  Result time 03/08/24 12:43:03      Final result by Roddy Almanza MD (03/08/24 12:43:03)                   Impression:      No acute intracranial abnormality identified.      Electronically signed by: Roddy Almanza  Date:    03/08/2024  Time:    12:43               Narrative:    EXAMINATION:  CT HEAD WITHOUT CONTRAST    CLINICAL HISTORY:  Stroke, follow up;    TECHNIQUE:  Low dose axial images were obtained through the head.  Coronal and sagittal reformations were also performed. Contrast was not administered.    Automatic exposure control was utilized to reduce the patient's radiation dose.    DLP= 933    COMPARISON:  None.    FINDINGS:  No acute intracranial hemorrhage,  edema or mass. No acute parenchymal abnormality.    Mild cerebral atrophy with concordant ventricular enlargement.    There is normal gray white differentiation.    The osseous structures are normal.    The mastoid air cells are clear.    The auditory canals are patent bilaterally.    The globes and orbital contents are normal bilaterally.    The visualized maxillary, ethmoid and sphenoid sinuses are clear.                                       X-Ray Chest 1 View (Final result)  Result time 03/08/24 12:07:28      Final result by Roddy Almanza MD (03/08/24 12:07:28)                   Impression:      No acute cardiopulmonary process.  Findings of chronic lung disease.      Electronically signed by: Roddy Almanza  Date:    03/08/2024  Time:    12:07               Narrative:    EXAMINATION:  XR CHEST 1 VIEW    CLINICAL HISTORY:  weakness;    TECHNIQUE:  Single view of the chest    COMPARISON:  10/24/2023    FINDINGS:  Prominent interstitial markings with no focal opacification.  No pleural effusion or pneumothorax.    The cardiomediastinal silhouette is within normal limits.    No acute osseous abnormality.                                    EKG:       Telemetry:  SR    Physical Exam  Vitals and nursing note reviewed.   Constitutional:       General: He is not in acute distress.     Appearance: Normal appearance. He is not ill-appearing.   HENT:      Head: Normocephalic.      Mouth/Throat:      Mouth: Mucous membranes are moist.      Pharynx: Oropharynx is clear.   Cardiovascular:      Rate and Rhythm: Normal rate and regular rhythm.   Pulmonary:      Effort: Pulmonary effort is normal. No respiratory distress.      Breath sounds: Normal breath sounds. No wheezing or rales.   Abdominal:      Palpations: Abdomen is soft.   Musculoskeletal:      Cervical back: Neck supple.   Skin:     General: Skin is warm and dry.   Neurological:      Mental Status: He is alert.   Psychiatric:         Behavior: Behavior normal.          Home Medications:   No current facility-administered medications on file prior to encounter.     Current Outpatient Medications on File Prior to Encounter   Medication Sig Dispense Refill    butalbital-aspirin-caffeine -40 mg (FIORINAL) -40 mg Cap Take 1 capsule by mouth every 4 (four) hours as needed.      diazePAM (VALIUM) 10 MG Tab TAKE ONE (1) TABLET BY MOUTH EVERY EIGHT (8) HOURS AS NEEDED FOR ANXIETY (Patient taking differently: Take 10 mg by mouth as needed (at bedtime).) 30 tablet 2    ezetimibe (ZETIA) 10 mg tablet Take 1 tablet by mouth Daily.      hydrALAZINE (APRESOLINE) 25 MG tablet TAKE TWO (2) TABLETS BY MOUTH (3) TIMES DAILY (Patient taking differently: Take 25 mg by mouth 3 (three) times daily.) 540 tablet 2    HYDROcodone-acetaminophen (NORCO)  mg per tablet Take 1 tablet by mouth every 6 (six) hours as needed for Pain. 90 tablet 0    isosorbide mononitrate (IMDUR) 30 MG 24 hr tablet Take 30 mg by mouth once daily.      potassium gluconate 595 mg (99 mg) Tab Take 1 tablet by mouth once.      rivaroxaban (XARELTO) 2.5 mg Tab Take 2.5 mg by mouth 2 (two) times daily.      vortioxetine (TRINTELLIX) 10 mg Tab Take 10 mg by mouth nightly.      amLODIPine (NORVASC) 5 MG tablet Take 1 tablet by mouth Daily.      aspirin (ECOTRIN) 81 MG EC tablet Take 81 mg by mouth once daily.      buPROPion (WELLBUTRIN XL) 150 MG TB24 tablet Take 150 mg by mouth once daily.      cholecalciferol, vitamin D3, (VITAMIN D3) 25 mcg (1,000 unit) capsule Take 1,000 Units by mouth once daily.      cyanocobalamin, vitamin B-12, 50 mcg tablet Take 50 mcg by mouth.      DEXILANT 60 mg capsule TAKE ONE CAPSULE BY MOUTH EVERY DAY 90 capsule 3    fenofibrate micronized (LOFIBRA) 200 MG Cap Take 200 mg by mouth daily with breakfast.      icosapent ethyL (VASCEPA) 1 gram Cap TAKE 2 CAPSULES BY MOUTH 2 TIMES A DAY. (Patient not taking: Reported on 12/20/2023) 360 capsule 1    lysine 1,000 mg Tab Take 1 tablet by  mouth once daily.      multivitamin with minerals tablet Take 1 tablet by mouth once daily.      niacin 500 MG CpSR Take 250 mg by mouth every evening.      nitroGLYCERIN (NITROSTAT) 0.4 MG SL tablet Nitrostat 0.4 mg sublingual tablet   Place by sublingual route.      rosuvastatin (CRESTOR) 40 MG Tab Take 1 tablet by mouth Daily.      sertraline (ZOLOFT) 50 MG tablet Take 1 tablet (50 mg total) by mouth once daily. 90 tablet 0    triamcinolone acetonide 0.1% (KENALOG) 0.1 % cream Apply topically.       Current Inpatient Medications:    Current Facility-Administered Medications:     acetaminophen tablet 650 mg, 650 mg, Oral, Q8H PRN, Garima Green AGACNP-BC    acetaminophen tablet 650 mg, 650 mg, Oral, Q4H PRN, Garima Green AGACNP-BC    ondansetron injection 4 mg, 4 mg, Intravenous, Q8H PRN, Garima Green AGACNP-BC    Current Outpatient Medications:     butalbital-aspirin-caffeine -40 mg (FIORINAL) -40 mg Cap, Take 1 capsule by mouth every 4 (four) hours as needed., Disp: , Rfl:     diazePAM (VALIUM) 10 MG Tab, TAKE ONE (1) TABLET BY MOUTH EVERY EIGHT (8) HOURS AS NEEDED FOR ANXIETY (Patient taking differently: Take 10 mg by mouth as needed (at bedtime).), Disp: 30 tablet, Rfl: 2    ezetimibe (ZETIA) 10 mg tablet, Take 1 tablet by mouth Daily., Disp: , Rfl:     hydrALAZINE (APRESOLINE) 25 MG tablet, TAKE TWO (2) TABLETS BY MOUTH (3) TIMES DAILY (Patient taking differently: Take 25 mg by mouth 3 (three) times daily.), Disp: 540 tablet, Rfl: 2    HYDROcodone-acetaminophen (NORCO)  mg per tablet, Take 1 tablet by mouth every 6 (six) hours as needed for Pain., Disp: 90 tablet, Rfl: 0    isosorbide mononitrate (IMDUR) 30 MG 24 hr tablet, Take 30 mg by mouth once daily., Disp: , Rfl:     potassium gluconate 595 mg (99 mg) Tab, Take 1 tablet by mouth once., Disp: , Rfl:     rivaroxaban (XARELTO) 2.5 mg Tab, Take 2.5 mg by mouth 2 (two) times daily., Disp: , Rfl:     vortioxetine (TRINTELLIX)  10 mg Tab, Take 10 mg by mouth nightly., Disp: , Rfl:     amLODIPine (NORVASC) 5 MG tablet, Take 1 tablet by mouth Daily., Disp: , Rfl:     aspirin (ECOTRIN) 81 MG EC tablet, Take 81 mg by mouth once daily., Disp: , Rfl:     buPROPion (WELLBUTRIN XL) 150 MG TB24 tablet, Take 150 mg by mouth once daily., Disp: , Rfl:     cholecalciferol, vitamin D3, (VITAMIN D3) 25 mcg (1,000 unit) capsule, Take 1,000 Units by mouth once daily., Disp: , Rfl:     cyanocobalamin, vitamin B-12, 50 mcg tablet, Take 50 mcg by mouth., Disp: , Rfl:     DEXILANT 60 mg capsule, TAKE ONE CAPSULE BY MOUTH EVERY DAY, Disp: 90 capsule, Rfl: 3    fenofibrate micronized (LOFIBRA) 200 MG Cap, Take 200 mg by mouth daily with breakfast., Disp: , Rfl:     icosapent ethyL (VASCEPA) 1 gram Cap, TAKE 2 CAPSULES BY MOUTH 2 TIMES A DAY. (Patient not taking: Reported on 12/20/2023), Disp: 360 capsule, Rfl: 1    lysine 1,000 mg Tab, Take 1 tablet by mouth once daily., Disp: , Rfl:     multivitamin with minerals tablet, Take 1 tablet by mouth once daily., Disp: , Rfl:     niacin 500 MG CpSR, Take 250 mg by mouth every evening., Disp: , Rfl:     nitroGLYCERIN (NITROSTAT) 0.4 MG SL tablet, Nitrostat 0.4 mg sublingual tablet  Place by sublingual route., Disp: , Rfl:     rosuvastatin (CRESTOR) 40 MG Tab, Take 1 tablet by mouth Daily., Disp: , Rfl:     sertraline (ZOLOFT) 50 MG tablet, Take 1 tablet (50 mg total) by mouth once daily., Disp: 90 tablet, Rfl: 0    triamcinolone acetonide 0.1% (KENALOG) 0.1 % cream, Apply topically., Disp: , Rfl:   VTE Risk Mitigation (From admission, onward)           Ordered     IP VTE HIGH RISK PATIENT  Once         03/08/24 1351     Place sequential compression device  Until discontinued         03/08/24 1351                  Assessment:   Chest Pain (No CP at Current Time)    - Troponin Values Normal  CAD    - PCI RCA Balloon Angioplasty/JACKY (3.9.16) (Patent Existing Mid LAD/Distal Stents, Patent OM2 Stent)    - PET (8.18.22): No  Ischemic/Low Risk    - EF 60-65%  Hypertension/Hypertensive Heart Disease- BP Above Goal    - Mild LVH  Hyperlipidemia  Left Sided Paresthesias    - CT Head Negative  Chronic Kidney Disease Stage II  Remote Tobacco Use  GERD  Elevated BMI  History of Diverticulitis  ASCVD    - On Xarelto 2.5 Mg/Aspirin 81 Mg Outpatient  LUIS/CPAP  OA  Depression  Chronic Lung Disease/Interstitial Pneumonitis     Plan:   Resume Home Medications- Hold Xarelto for now in case cath required. Resume Aspirin 81 Mg Daily  Trend Cardiac Enzymes  Start Coreg 6.25 Mg PO BID for BP Control. Resume Home Hydralazine. Amlodipine Allergy Documented.  Plan initiation of Heparin Infusion per protocol once cleared from neurological standpoint  Echocardiogram Ordered- Will follow up results    Thank you for your consult.     ANDREA Smith  Cardiology  [facility] - Emergency Dept  03/08/2024     Physician addendum:  I have seen and examined this patient as a split-shared visit with the CHILANGO d/t complicated medical management of above problems written in assessment and high acuity requiring physician expertise in medical decision-making. I performed the substantive portion of the history and exam. Above medical decision-making is also formulated by me.    Cardiovascular exam:  S1, S2  Lungs:  fine crackles at bases.  Extremities: no edema bilaterally    Plan:  Patient appears to have unstable angina.  Since there is a concern for stroke, wait to start anticoagulation until cleared by Neurology.  Follow up echocardiogram.    Endy Hugo MD  Cardiologist

## 2024-03-08 NOTE — H&P
[facility]  Hospital Medicine History & Physical Examination       Patient Name: Augusto Snow  MRN: [id number]  Patient Class: Emergency   Admission Date: 03/08/2024   Admitting Service: Hospital Medicine   Length of Stay: 0  Attending Physician: Dr. Tam  Primary Care Provider: Chanda Lloyd NP  Face-to-Face encounter date: 03/08/2024  Code Status: Full  Chief Complaint: Chest Pain (Pt. C/o intermittent CP x 2 weeks worsening today. [facility] Cardiac stents x 6, Cardiologist Dr. Cleveland. )      Patient information was obtained from patient, patient's family, past medical records and ER records.      HISTORY OF PRESENT ILLNESS:   Augusto Snow is a 78 y.o. male with a PMHx of HTN, HLD, CKD stage 2, vitamin-D deficiency, LUIS on CPAP, CAD s/p PCI x6, osteoarthritis, depression, UIP who presented to [facility] on 3/8/[facility]24 with c/o intermittent chest pain x2 weeks.  Chest pain described as a constant ache, radiates down the left arm and under the right breast.  He also endorsed left-sided numbness to his head and left arm weakness.    Upon presentation to ED, vital signed included /79, HR 80, RR 18, SpO2 94% on room air, temperature 97.7° F.  Labs notable for chloride 113, CO2 21, glucose 116, AST 44.  BNP normal.  Initial troponin undetectable.  CXR negative for acute cardiopulmonary process, findings of chronic lung disease noted.  CT head without contrast negative for acute intracranial abnormality.  He received aspirin 325 mg p.o., hydralazine 25 mg p.o. and normal saline 500 mL IV bolus in ED. Cardiology service consulted in ED. Admitted to hospital medicine service for further medical management.    REVIEW OF SYSTEMS:   Except as documented, all other systems reviewed and negative     PAST MEDICAL HISTORY:   Essential hypertension   Hyperlipidemia  CKD stage 2   Vitamin-D deficiency   LUIS on CPAP   CAD s/p PCI x6  Osteoarthritis  Depression  Usual interstitial pneumonitis    PAST  SURGICAL HISTORY:     Past Surgical History:   Procedure Laterality Date    ANGIOGRAPHY      BACK SURGERY      x 2    COLONOSCOPY  06/01/2016    coronary stents      6  stents    FLUOROSCOPY      GANGLION CYST EXCISION      HERNIA REPAIR      LAPAROSCOPIC CHOLECYSTECTOMY N/A 11/18/2022    Procedure: CHOLECYSTECTOMY, LAPAROSCOPIC;  Surgeon: Tito Palomino MD;  Location: [facility];  Service: General;  Laterality: N/A;    NECK SURGERY      plates /screws    PENILE PROSTHESIS IMPLANT      PROSTATECTOMY      ROTATOR CUFF REPAIR Left     TONSILLECTOMY      TOTAL HIP ARTHROPLASTY Bilateral     TOTAL KNEE ARTHROPLASTY Bilateral 12/17/2018       FAMILY HISTORY:   Mother:  HLD, CHF   Father: HLD   Sister:  CAD/MI  Brother: Cancer    SOCIAL HISTORY:   Denied alcohol, tobacco or illicit drug use    ALLERGIES:   Amlodipine, Atorvastatin, Metoprolol, Morphine, Ranitidine, and Grass pollen-darek grass standard    HOME MEDICATIONS:     Prior to Admission medications    Medication Sig Start Date End Date Taking? Authorizing Provider   butalbital-aspirin-caffeine -40 mg (FIORINAL) -40 mg Cap Take 1 capsule by mouth every 4 (four) hours as needed.   Yes Provider, Historical   diazePAM (VALIUM) 10 MG Tab TAKE ONE (1) TABLET BY MOUTH EVERY EIGHT (8) HOURS AS NEEDED FOR ANXIETY  Patient taking differently: Take 10 mg by mouth as needed (at bedtime). 11/28/23  Yes Johny Griffith MD   ezetimibe (ZETIA) 10 mg tablet Take 1 tablet by mouth Daily. 5/19/23  Yes Provider, Historical   hydrALAZINE (APRESOLINE) 25 MG tablet TAKE TWO (2) TABLETS BY MOUTH (3) TIMES DAILY  Patient taking differently: Take 25 mg by mouth 3 (three) times daily. 4/4/23  Yes Johny Griffith MD   HYDROcodone-acetaminophen (NORCO)  mg per tablet Take 1 tablet by mouth every 6 (six) hours as needed for Pain. 12/18/23  Yes Johny Griffith MD   isosorbide mononitrate (IMDUR) 30 MG 24 hr tablet Take 30 mg by mouth once daily. 9/6/21  Yes Provider,  Historical   potassium gluconate 595 mg (99 mg) Tab Take 1 tablet by mouth once.   Yes Provider, Historical   rivaroxaban (XARELTO) 2.5 mg Tab Take 2.5 mg by mouth 2 (two) times daily. 2/16/22  Yes Provider, Historical   vortioxetine (TRINTELLIX) 10 mg Tab Take 10 mg by mouth nightly.   Yes Provider, Historical   amLODIPine (NORVASC) 5 MG tablet Take 1 tablet by mouth Daily. 2/8/23   Provider, Historical   aspirin (ECOTRIN) 81 MG EC tablet Take 81 mg by mouth once daily.    Provider, Historical   buPROPion (WELLBUTRIN XL) 150 MG TB24 tablet Take 150 mg by mouth once daily.    Provider, Historical   cholecalciferol, vitamin D3, (VITAMIN D3) 25 mcg (1,000 unit) capsule Take 1,000 Units by mouth once daily.    Provider, Historical   cyanocobalamin, vitamin B-12, 50 mcg tablet Take 50 mcg by mouth. 9/8/21   Provider, Historical   DEXILANT 60 mg capsule TAKE ONE CAPSULE BY MOUTH EVERY DAY 3/2/23   Johny Griffith MD   fenofibrate micronized (LOFIBRA) 200 MG Cap Take 200 mg by mouth daily with breakfast.    Provider, Historical   icosapent ethyL (VASCEPA) 1 gram Cap TAKE 2 CAPSULES BY MOUTH 2 TIMES A DAY.  Patient not taking: Reported on 12/20/2023 10/18/23   Barbara Segundo FNP   lysine 1,000 mg Tab Take 1 tablet by mouth once daily.    Provider, Historical   multivitamin with minerals tablet Take 1 tablet by mouth once daily.    Provider, Historical   niacin 500 MG CpSR Take 250 mg by mouth every evening.    Provider, Historical   nitroGLYCERIN (NITROSTAT) 0.4 MG SL tablet Nitrostat 0.4 mg sublingual tablet   Place by sublingual route. 6/3/21   Provider, Historical   rosuvastatin (CRESTOR) 40 MG Tab Take 1 tablet by mouth Daily. 2/8/23   Provider, Historical   sertraline (ZOLOFT) 50 MG tablet Take 1 tablet (50 mg total) by mouth once daily. 6/29/23 9/27/23  Barbara Segundo FNP   triamcinolone acetonide 0.1% (KENALOG) 0.1 % cream Apply topically. 9/8/21   Provider, Historical      ________________________________________________________________________  INPATIENT LIST OF MEDICATIONS   No current facility-administered medications for this encounter.    Current Outpatient Medications:     butalbital-aspirin-caffeine -40 mg (FIORINAL) -40 mg Cap, Take 1 capsule by mouth every 4 (four) hours as needed., Disp: , Rfl:     diazePAM (VALIUM) 10 MG Tab, TAKE ONE (1) TABLET BY MOUTH EVERY EIGHT (8) HOURS AS NEEDED FOR ANXIETY (Patient taking differently: Take 10 mg by mouth as needed (at bedtime).), Disp: 30 tablet, Rfl: 2    ezetimibe (ZETIA) 10 mg tablet, Take 1 tablet by mouth Daily., Disp: , Rfl:     hydrALAZINE (APRESOLINE) 25 MG tablet, TAKE TWO (2) TABLETS BY MOUTH (3) TIMES DAILY (Patient taking differently: Take 25 mg by mouth 3 (three) times daily.), Disp: 540 tablet, Rfl: 2    HYDROcodone-acetaminophen (NORCO)  mg per tablet, Take 1 tablet by mouth every 6 (six) hours as needed for Pain., Disp: 90 tablet, Rfl: 0    isosorbide mononitrate (IMDUR) 30 MG 24 hr tablet, Take 30 mg by mouth once daily., Disp: , Rfl:     potassium gluconate 595 mg (99 mg) Tab, Take 1 tablet by mouth once., Disp: , Rfl:     rivaroxaban (XARELTO) 2.5 mg Tab, Take 2.5 mg by mouth 2 (two) times daily., Disp: , Rfl:     vortioxetine (TRINTELLIX) 10 mg Tab, Take 10 mg by mouth nightly., Disp: , Rfl:     amLODIPine (NORVASC) 5 MG tablet, Take 1 tablet by mouth Daily., Disp: , Rfl:     aspirin (ECOTRIN) 81 MG EC tablet, Take 81 mg by mouth once daily., Disp: , Rfl:     buPROPion (WELLBUTRIN XL) 150 MG TB24 tablet, Take 150 mg by mouth once daily., Disp: , Rfl:     cholecalciferol, vitamin D3, (VITAMIN D3) 25 mcg (1,000 unit) capsule, Take 1,000 Units by mouth once daily., Disp: , Rfl:     cyanocobalamin, vitamin B-12, 50 mcg tablet, Take 50 mcg by mouth., Disp: , Rfl:     DEXILANT 60 mg capsule, TAKE ONE CAPSULE BY MOUTH EVERY DAY, Disp: 90 capsule, Rfl: 3    fenofibrate micronized (LOFIBRA) 200 MG  Cap, Take 200 mg by mouth daily with breakfast., Disp: , Rfl:     icosapent ethyL (VASCEPA) 1 gram Cap, TAKE 2 CAPSULES BY MOUTH 2 TIMES A DAY. (Patient not taking: Reported on 12/20/2023), Disp: 360 capsule, Rfl: 1    lysine 1,000 mg Tab, Take 1 tablet by mouth once daily., Disp: , Rfl:     multivitamin with minerals tablet, Take 1 tablet by mouth once daily., Disp: , Rfl:     niacin 500 MG CpSR, Take 250 mg by mouth every evening., Disp: , Rfl:     nitroGLYCERIN (NITROSTAT) 0.4 MG SL tablet, Nitrostat 0.4 mg sublingual tablet  Place by sublingual route., Disp: , Rfl:     rosuvastatin (CRESTOR) 40 MG Tab, Take 1 tablet by mouth Daily., Disp: , Rfl:     sertraline (ZOLOFT) 50 MG tablet, Take 1 tablet (50 mg total) by mouth once daily., Disp: 90 tablet, Rfl: 0    triamcinolone acetonide 0.1% (KENALOG) 0.1 % cream, Apply topically., Disp: , Rfl:     Scheduled Meds:  Continuous Infusions:  PRN Meds:.    PHYSICAL EXAM:     VITAL SIGNS: 24 HRS MIN & MAX LAST   Temp  Min: 97.7 °F (36.5 °C)  Max: 97.7 °F (36.5 °C) 97.7 °F (36.5 °C)   BP  Min: 108/65  Max: 172/73 (!) 167/85   Pulse  Min: 68  Max: 80  74   Resp  Min: 18  Max: 24 19   SpO2  Min: 93 %  Max: 95 % (!) 94 %       Physical Exam per attending MD     LABS AND IMAGING:     Recent Labs   Lab 03/08/24  1056   WBC 5.75   RBC 5.80   HGB 16.8   HCT 49.0   MCV 84.5   MCH 29.0   MCHC 34.3   RDW 14.6      MPV 9.9       Recent Labs   Lab 03/08/24  1056      K 4.0   CO2 21*   BUN 23.7   CREATININE 0.93   CALCIUM 8.9   ALBUMIN 3.6   ALKPHOS 84   ALT 28   AST 44*   BILITOT 0.8       Microbiology Results (last 7 days)       ** No results found for the last 168 hours. **             CT Head Without Contrast  Narrative: EXAMINATION:  CT HEAD WITHOUT CONTRAST    CLINICAL HISTORY:  Stroke, follow up;    TECHNIQUE:  Low dose axial images were obtained through the head.  Coronal and sagittal reformations were also performed. Contrast was not administered.    Automatic  exposure control was utilized to reduce the patient's radiation dose.    DLP= 933    COMPARISON:  None.    FINDINGS:  No acute intracranial hemorrhage, edema or mass. No acute parenchymal abnormality.    Mild cerebral atrophy with concordant ventricular enlargement.    There is normal gray white differentiation.    The osseous structures are normal.    The mastoid air cells are clear.    The auditory canals are patent bilaterally.    The globes and orbital contents are normal bilaterally.    The visualized maxillary, ethmoid and sphenoid sinuses are clear.  Impression: No acute intracranial abnormality identified.    Electronically signed by: Roddy Almanza  Date:    03/08/2024  Time:    12:43  X-Ray Chest 1 View  Narrative: EXAMINATION:  XR CHEST 1 VIEW    CLINICAL HISTORY:  weakness;    TECHNIQUE:  Single view of the chest    COMPARISON:  10/24/2023    FINDINGS:  Prominent interstitial markings with no focal opacification.  No pleural effusion or pneumothorax.    The cardiomediastinal silhouette is within normal limits.    No acute osseous abnormality.  Impression: No acute cardiopulmonary process.  Findings of chronic lung disease.    Electronically signed by: Roddy Almanza  Date:    03/08/2024  Time:    12:07        ASSESSMENT & PLAN:     Atypical chest pain   Left-sided paresthesias   Hypertensive urgency   CAD s/p PCI x6   Depression   History of essential hypertension, hyperlipidemia, CKD stage II, LUIS on CPAP, osteoarthritis, vitamin-D deficiency    Plan:  Cardiology consulted in ED, appreciate recommendations  Cardiac monitoring  Echocardiogram pending  P.r.n. antihypertensives   MRI brain without contrast pending  Resume appropriate home medications once updated   Labs in a.m.      VTE Prophylaxis: Lovenox    Discharge Planning and Disposition: HUNTER THOMSON, Garima Green NP have reviewed and discussed the case with Dr. Tam.  Please see the attending MD's addendum for further assessment and  plan.    Garima Green, AGAB[facility]-BC  Department of Hospital Medicine   [facility]   03/08/2024    _______________________________________________________________________________  MD Addendum:  I, Dr.amusa lópez , assumed care of this patient  on 3/8 at 3 pm  For the patient encounter, I performed the substantive portion of the visit, I reviewed the NP/PA documentation, treatment plan, and medical decision making.  I had face to face time with this patient     A. History:  78 y.o. male with a PMHx of HTN, HLD, CKD stage 2, vitamin-D deficiency, LUIS on CPAP, CAD s/p PCI x6, osteoarthritis, depression, UIP who presented to [facility] on 3/8/2024 with c/o intermittent chest pain x2 weeks.  Chest pain described as a constant ache, radiates down the left arm and under the right breast that subsided with use of SLN.  He also endorsed left-sided numbness to his head and left arm weakness after receiving Nitro sublingual and bp was noticed to drop.    He received aspirin 325 mg p.o., hydralazine 25 mg p.o. and normal saline 500 mL IV bolus in ED. Cardiology service consulted in ED. Admitted to hospital medicine service for further medical management.      vitals included   /79, HR 80, RR 18, SpO2 94% on room air, temperature 97.7° F.     Labs notable for   chloride 113, CO2 21, glucose 116, AST 44.  BNP normal.  Initial troponin undetectable.      Imaging  CXR negative for acute cardiopulmonary process, findings of chronic lung disease noted.    CT head without contrast negative for acute intracranial abnormality.        B. Physical exam:  Gen- obese male, not in distress  Heent- perrla  Cvs- RRR, S1S2 , no mrg  Resp- CTAB/L , no wheezes, rhonchi  GI- obese abdomen, soft, not tender, normal BS   - normal genitalia   SAADIA- moving all limbs symmetrically, no deficits, no facial asymmetry   Neuro- AXO X 3,, CN 2-12 normal, motor is symmetrical, no sensory deficits   Psych- appropriate affect and  mood     ASSESSMENT & PLAN:      Typical chest pain   -- Hx of CAD s/p PCI x6   Hypertensive urgency   Depression   History of essential hypertension, hyperlipidemia, CKD stage II, LUIS on CPAP, osteoarthritis, vitamin-D deficiency     Plan:  Initial trop negative   Trend troponins x 3  Cardiology consulted in ED, appreciate recommendations  Cardiac monitoring  Echocardiogram pending  P.r.n. antihypertensives   Resume appropriate home medications once updated   I have no concerns for acute cva at this time.   Labs in a.m.        VTE Prophylaxis: Lovenox     Discharge Planning and Disposition: TBD       All diagnosis and differential diagnosis have been reviewed; assessment and plan has been documented; I have personally reviewed the labs and test results that are presently available; I have reviewed the patients medication list; I have reviewed the consulting providers response and recommendations. I have reviewed or attempted to review medical records based upon their availability.    All of the patient and family questions have been addressed and answered. Patient's is agreeable to the above stated plan. I will continue to monitor closely and make adjustments to medical management as needed.      03/08/2024

## 2024-03-08 NOTE — ED NOTES
Messaged Karime Langston with Neurology per Laura NP with [facility], to see if pt was able to be seen/cleared today for heparin drip.

## 2024-03-08 NOTE — ASSESSMENT & PLAN NOTE
Presented to ED with chest pain on 03/08 and left arm weakness/numbness   Stroke risk factors:  HTN, HLD, CAD, LUIS     Stroke workup   CT head:  Negative    Plan  -He is very claustrophobic, unable to obtain MRI brain  -Will repeat CT head in a.m., if negative could start anticoagulation, this is definitely more of a conservative approach as there is no large acute infarct on CT head on admission.  Overall suspicion for stroke at this time is very low after talking to the patient.

## 2024-03-08 NOTE — CONSULTS
[facility] - Emergency Dept  Neurology  Consult Note    Patient Name: Augusto Snow  MRN: [id number]  Admission Date: 3/8/2024  Hospital Length of Stay: 0 days  Code Status: Full Code   Attending Provider: Cammy Tam MD   Consulting Provider: Karime Langston NP  Primary Care Physician: Chanda Lloyd NP  Principal Problem:<principal problem not specified>    Inpatient consult to Neurology  Consult performed by: Karime Langston NP  Consult ordered by: Garima Green, [facility]         Subjective:     Chief Complaint:  chest pain     HPI:   78-year-old male with a past medical history of HTN, HLD, CAD, remote tobacco use, LUIS presented to ED on 03/08 for chest pain.  He reported his chest pain started about 2 weeks ago.  He describes this as constant ache, radiating down his left arm.  He also reported left-sided numbness to his head and left arm weakness.  CT head was negative for acute intracranial abnormalities.  Cardiology was consulted and recommending heparin drip but wanted clearance from Neurology.  Neurology was consulted for stroke workup.    He reports he has had chest pain for a few weeks.  He reports upon arrival to ED, someone noticed his left arm was weak.  He denies any weakness, reports he is at baseline neurologically.      Past Medical History:   Diagnosis Date    Arthritis     Heart attack     High cholesterol     Hypertension     Sleep apnea     wears CPAP       Past Surgical History:   Procedure Laterality Date    ANGIOGRAPHY      BACK SURGERY      x 2    COLONOSCOPY  06/01/2016    coronary stents      6  stents    FLUOROSCOPY      GANGLION CYST EXCISION      HERNIA REPAIR      LAPAROSCOPIC CHOLECYSTECTOMY N/A 11/18/2022    Procedure: CHOLECYSTECTOMY, LAPAROSCOPIC;  Surgeon: Tito Palomino MD;  Location: [facility];  Service: General;  Laterality: N/A;    NECK SURGERY      plates /screws    PENILE PROSTHESIS IMPLANT      PROSTATECTOMY      ROTATOR CUFF REPAIR  Left     TONSILLECTOMY      TOTAL HIP ARTHROPLASTY Bilateral     TOTAL KNEE ARTHROPLASTY Bilateral 12/17/2018       Review of patient's allergies indicates:   Allergen Reactions    Amlodipine Itching     Other reaction(s): Propensity to adverse reactions to drug    Atorvastatin      Other reaction(s): body aches    Metoprolol      Other reaction(s): body aches    Morphine Itching     Other reaction(s): Hallucinations    Ranitidine      Other reaction(s): Propensity to adverse reactions to drug    Grass pollen-darek grass standard Rash       Current Neurological Medications:     No current facility-administered medications on file prior to encounter.     Current Outpatient Medications on File Prior to Encounter   Medication Sig    butalbital-aspirin-caffeine -40 mg (FIORINAL) -40 mg Cap Take 1 capsule by mouth every 4 (four) hours as needed.    diazePAM (VALIUM) 10 MG Tab TAKE ONE (1) TABLET BY MOUTH EVERY EIGHT (8) HOURS AS NEEDED FOR ANXIETY (Patient taking differently: Take 10 mg by mouth as needed (at bedtime).)    ezetimibe (ZETIA) 10 mg tablet Take 1 tablet by mouth Daily.    hydrALAZINE (APRESOLINE) 25 MG tablet TAKE TWO (2) TABLETS BY MOUTH (3) TIMES DAILY (Patient taking differently: Take 25 mg by mouth 3 (three) times daily.)    HYDROcodone-acetaminophen (NORCO)  mg per tablet Take 1 tablet by mouth every 6 (six) hours as needed for Pain.    isosorbide mononitrate (IMDUR) 30 MG 24 hr tablet Take 30 mg by mouth once daily.    potassium gluconate 595 mg (99 mg) Tab Take 1 tablet by mouth once.    rivaroxaban (XARELTO) 2.5 mg Tab Take 2.5 mg by mouth 2 (two) times daily.    vortioxetine (TRINTELLIX) 10 mg Tab Take 10 mg by mouth nightly.    amLODIPine (NORVASC) 5 MG tablet Take 1 tablet by mouth Daily.    aspirin (ECOTRIN) 81 MG EC tablet Take 81 mg by mouth once daily.    buPROPion (WELLBUTRIN XL) 150 MG TB24 tablet Take 150 mg by mouth once daily.    cholecalciferol, vitamin D3, (VITAMIN D3)  25 mcg (1,000 unit) capsule Take 1,000 Units by mouth once daily.    cyanocobalamin, vitamin B-12, 50 mcg tablet Take 50 mcg by mouth.    DEXILANT 60 mg capsule TAKE ONE CAPSULE BY MOUTH EVERY DAY    fenofibrate micronized (LOFIBRA) 200 MG Cap Take 200 mg by mouth daily with breakfast.    icosapent ethyL (VASCEPA) 1 gram Cap TAKE 2 CAPSULES BY MOUTH 2 TIMES A DAY. (Patient not taking: Reported on 12/20/2023)    lysine 1,000 mg Tab Take 1 tablet by mouth once daily.    multivitamin with minerals tablet Take 1 tablet by mouth once daily.    niacin 500 MG CpSR Take 250 mg by mouth every evening.    nitroGLYCERIN (NITROSTAT) 0.4 MG SL tablet Nitrostat 0.4 mg sublingual tablet   Place by sublingual route.    rosuvastatin (CRESTOR) 40 MG Tab Take 1 tablet by mouth Daily.    sertraline (ZOLOFT) 50 MG tablet Take 1 tablet (50 mg total) by mouth once daily.    triamcinolone acetonide 0.1% (KENALOG) 0.1 % cream Apply topically.     Family History       Problem Relation (Age of Onset)    Cancer Brother    Coronary artery disease Sister    Heart attack Sister    Heart failure Mother    Hyperlipidemia Mother, Father          Tobacco Use    Smoking status: Never    Smokeless tobacco: Never   Substance and Sexual Activity    Alcohol use: Not Currently    Drug use: Never    Sexual activity: Not Currently     Review of Systems   Cardiovascular:  Positive for chest pain.   Neurological:  Negative for dizziness, tremors, seizures, syncope, facial asymmetry, speech difficulty, weakness, light-headedness, numbness and headaches.   All other systems reviewed and are negative.    Objective:     Vital Signs (Most Recent):  Temp: 97.7 °F (36.5 °C) (03/08/24 1028)  Pulse: 74 (03/08/24 1358)  Resp: 18 (03/08/24 1358)  BP: (!) 161/78 (03/08/24 1358)  SpO2: (!) 93 % (03/08/24 1358) Vital Signs (24h Range):  Temp:  [97.7 °F (36.5 °C)] 97.7 °F (36.5 °C)  Pulse:  [68-80] 74  Resp:  [18-24] 18  SpO2:  [93 %-96 %] 93 %  BP: (108-175)/(65-87) 161/78      Weight: 99.8 kg (220 lb)  Body mass index is 33.45 kg/m².     Physical Exam  Vitals and nursing note reviewed.   Constitutional:       General: He is not in acute distress.     Appearance: Normal appearance. He is not toxic-appearing.   HENT:      Head: Normocephalic.   Eyes:      General: No visual field deficit.     Extraocular Movements:      Right eye: Normal extraocular motion and no nystagmus.      Left eye: Normal extraocular motion and no nystagmus.      Pupils: Pupils are equal, round, and reactive to light.   Cardiovascular:      Rate and Rhythm: Normal rate.   Pulmonary:      Effort: Pulmonary effort is normal.      Breath sounds: Normal breath sounds.   Musculoskeletal:         General: No swelling. Normal range of motion.      Cervical back: Normal range of motion.      Right lower leg: No edema.      Left lower leg: No edema.   Skin:     General: Skin is warm and dry.      Capillary Refill: Capillary refill takes less than 2 seconds.   Neurological:      General: No focal deficit present.      Mental Status: He is alert and oriented to person, place, and time.      Cranial Nerves: No dysarthria or facial asymmetry.      Sensory: Sensation is intact. No sensory deficit.      Motor: Motor function is intact. No weakness, tremor, atrophy, abnormal muscle tone, seizure activity or pronator drift.      Coordination: Coordination normal. Finger-Nose-Finger Test normal.   Psychiatric:         Attention and Perception: Attention normal.         Mood and Affect: Affect normal.         Speech: Speech normal.         Behavior: Behavior normal.          NEUROLOGICAL EXAMINATION:     MENTAL STATUS   Oriented to person, place, and time.   Speech: speech is normal     CRANIAL NERVES     CN III, IV, VI   Pupils are equal, round, and reactive to light.    GAIT AND COORDINATION      Coordination   Finger to nose coordination: normal      Significant Labs:   Recent Lab Results         03/08/24  1355   03/08/24  1056    03/08/24  1024        Albumin/Globulin Ratio   1.2         Albumin   3.6         ALP   84         ALT   28         AST   44         Baso #   0.04         Basophil %   0.7         BILIRUBIN TOTAL   0.8         BNP   15.2         BUN   23.7         Calcium   8.9         Chloride   113         CO2   21         Creatinine   0.93         eGFR   >60         Eos #   0.10         Eos %   1.7         Globulin, Total   3.1         Glucose   116         Hematocrit   49.0         Hemoglobin   16.8         Immature Grans (Abs)   0.03         Immature Granulocytes   0.5         Lymph #   1.51         LYMPH %   26.3         MCH   29.0         MCHC   34.3         MCV   84.5         Mono #   0.51         Mono %   8.9         MPV   9.9         Neut #   3.56         Neut %   61.9         nRBC   0.0         QRS Duration     94       [facility] QTC Calculation     459       Platelet Count   143         Potassium   4.0         PROTEIN TOTAL   6.7         RBC   5.80         RDW   14.6         Sodium   141         Troponin I <0.010   <0.010         WBC   5.75                 Significant Imaging: I have reviewed all pertinent imaging results/findings within the past 24 hours.  Assessment and Plan:     Left arm weakness  Presented to ED with chest pain on 03/08 and left arm weakness/numbness   Stroke risk factors:  HTN, HLD, CAD, LUIS     Stroke workup   CT head:  Negative    Plan  -He is very claustrophobic, unable to obtain MRI brain  -Will repeat CT head in a.m., if negative could start anticoagulation, this is definitely more of a conservative approach as there is no large acute infarct on CT head on admission.  Overall suspicion for stroke at this time is very low after talking to the patient. Will defer to MD        VTE Risk Mitigation (From admission, onward)           Ordered     IP VTE HIGH RISK PATIENT  Once         03/08/24 1351     Place sequential compression device  Until discontinued         03/08/24 1351                    Thank you  for your consult.  Further recommendations to follow from MD.     Karime Langston NP  Neurology  [facility] - Emergency Dept

## 2024-03-09 LAB
ALBUMIN SERPL-MCNC: 3.5 G/DL (ref 3.4–4.8)
ALBUMIN/GLOB SERPL: 1.1 RATIO (ref 1.1–2)
ALP SERPL-CCNC: 76 UNIT/L (ref 40–150)
ALT SERPL-CCNC: 28 UNIT/L (ref 0–55)
APTT PPP: 33.8 SECONDS (ref 23.2–33.7)
APTT PPP: 34.1 SECONDS (ref 23.2–33.7)
AST SERPL-CCNC: 39 UNIT/L (ref 5–34)
BASOPHILS # BLD AUTO: 0.06 X10(3)/MCL
BASOPHILS NFR BLD AUTO: 0.9 %
BILIRUB SERPL-MCNC: 0.6 MG/DL
BUN SERPL-MCNC: 23.4 MG/DL (ref 8.4–25.7)
CALCIUM SERPL-MCNC: 8.9 MG/DL (ref 8.8–10)
CHLORIDE SERPL-SCNC: 113 MMOL/L (ref 98–107)
CO2 SERPL-SCNC: 18 MMOL/L (ref 23–31)
CREAT SERPL-MCNC: 0.93 MG/DL (ref 0.73–1.18)
EOSINOPHIL # BLD AUTO: 0.16 X10(3)/MCL (ref 0–0.9)
EOSINOPHIL NFR BLD AUTO: 2.5 %
ERYTHROCYTE [DISTWIDTH] IN BLOOD BY AUTOMATED COUNT: 14.6 % (ref 11.5–17)
GFR SERPLBLD CREATININE-BSD FMLA CKD-EPI: >60 MLS/MIN/1.73/M2
GLOBULIN SER-MCNC: 3.2 GM/DL (ref 2.4–3.5)
GLUCOSE SERPL-MCNC: 86 MG/DL (ref 82–115)
HCT VFR BLD AUTO: 51.3 % (ref 42–52)
HGB BLD-MCNC: 17.2 G/DL (ref 14–18)
IMM GRANULOCYTES # BLD AUTO: 0.02 X10(3)/MCL (ref 0–0.04)
IMM GRANULOCYTES NFR BLD AUTO: 0.3 %
INR PPP: 1.1
LYMPHOCYTES # BLD AUTO: 2.01 X10(3)/MCL (ref 0.6–4.6)
LYMPHOCYTES NFR BLD AUTO: 30.9 %
MCH RBC QN AUTO: 28.8 PG (ref 27–31)
MCHC RBC AUTO-ENTMCNC: 33.5 G/DL (ref 33–36)
MCV RBC AUTO: 85.8 FL (ref 80–94)
MONOCYTES # BLD AUTO: 0.57 X10(3)/MCL (ref 0.1–1.3)
MONOCYTES NFR BLD AUTO: 8.8 %
NEUTROPHILS # BLD AUTO: 3.69 X10(3)/MCL (ref 2.1–9.2)
NEUTROPHILS NFR BLD AUTO: 56.6 %
NRBC BLD AUTO-RTO: 0 %
PLATELET # BLD AUTO: 136 X10(3)/MCL (ref 130–400)
PLATELETS.RETICULATED NFR BLD AUTO: 1.9 % (ref 0.9–11.2)
PMV BLD AUTO: 9.3 FL (ref 7.4–10.4)
POTASSIUM SERPL-SCNC: 4.3 MMOL/L (ref 3.5–5.1)
PROT SERPL-MCNC: 6.7 GM/DL (ref 5.8–7.6)
PROTHROMBIN TIME: 14.1 SECONDS (ref 12.5–14.5)
RBC # BLD AUTO: 5.98 X10(6)/MCL (ref 4.7–6.1)
SODIUM SERPL-SCNC: 142 MMOL/L (ref 136–145)
TROPONIN I SERPL-MCNC: 0.02 NG/ML (ref 0–0.04)
WBC # SPEC AUTO: 6.51 X10(3)/MCL (ref 4.5–11.5)

## 2024-03-09 PROCEDURE — 85730 THROMBOPLASTIN TIME PARTIAL: CPT | Performed by: INTERNAL MEDICINE

## 2024-03-09 PROCEDURE — 84484 ASSAY OF TROPONIN QUANT: CPT | Performed by: NURSE PRACTITIONER

## 2024-03-09 PROCEDURE — 85610 PROTHROMBIN TIME: CPT | Performed by: INTERNAL MEDICINE

## 2024-03-09 PROCEDURE — 21400001 HC TELEMETRY ROOM

## 2024-03-09 PROCEDURE — 25000003 PHARM REV CODE 250: Performed by: NURSE PRACTITIONER

## 2024-03-09 PROCEDURE — 85025 COMPLETE CBC W/AUTO DIFF WBC: CPT | Performed by: INTERNAL MEDICINE

## 2024-03-09 PROCEDURE — 25000003 PHARM REV CODE 250: Performed by: INTERNAL MEDICINE

## 2024-03-09 PROCEDURE — 80053 COMPREHEN METABOLIC PANEL: CPT | Performed by: NURSE PRACTITIONER

## 2024-03-09 PROCEDURE — 11000001 HC ACUTE MED/SURG PRIVATE ROOM

## 2024-03-09 PROCEDURE — 63600175 PHARM REV CODE 636 W HCPCS: Performed by: INTERNAL MEDICINE

## 2024-03-09 RX ORDER — HEPARIN SODIUM,PORCINE/D5W 25000/250
0-40 INTRAVENOUS SOLUTION INTRAVENOUS CONTINUOUS
Status: DISCONTINUED | OUTPATIENT
Start: 2024-03-09 | End: 2024-03-09

## 2024-03-09 RX ADMIN — ISOSORBIDE MONONITRATE 30 MG: 30 TABLET, EXTENDED RELEASE ORAL at 08:03

## 2024-03-09 RX ADMIN — HYDRALAZINE HYDROCHLORIDE 75 MG: 25 TABLET, FILM COATED ORAL at 08:03

## 2024-03-09 RX ADMIN — CARVEDILOL 6.25 MG: 3.12 TABLET, FILM COATED ORAL at 06:03

## 2024-03-09 RX ADMIN — ASPIRIN 81 MG: 81 TABLET, COATED ORAL at 08:03

## 2024-03-09 RX ADMIN — FENOFIBRATE 145 MG: 145 TABLET, FILM COATED ORAL at 08:03

## 2024-03-09 RX ADMIN — CARVEDILOL 6.25 MG: 3.12 TABLET, FILM COATED ORAL at 08:03

## 2024-03-09 RX ADMIN — HYDRALAZINE HYDROCHLORIDE 25 MG: 25 TABLET, FILM COATED ORAL at 05:03

## 2024-03-09 RX ADMIN — HEPARIN SODIUM 12 UNITS/KG/HR: 10000 INJECTION, SOLUTION INTRAVENOUS at 05:03

## 2024-03-09 RX ADMIN — RIVAROXABAN 2.5 MG: 2.5 TABLET, FILM COATED ORAL at 06:03

## 2024-03-09 RX ADMIN — HYDRALAZINE HYDROCHLORIDE 75 MG: 25 TABLET, FILM COATED ORAL at 02:03

## 2024-03-09 RX ADMIN — EZETIMIBE 10 MG: 10 TABLET ORAL at 08:03

## 2024-03-09 NOTE — PROGRESS NOTES
[facility] Tele[facility]    Cardiology  Progress Note    Patient Name: Augusto Snow  MRN: [id number]  Admission Date: 3/8/2024  Hospital Length of Stay: 1 days  Code Status: Full Code   Attending Physician: Cammy Tam MD   Primary Care Physician: Chanda Lloyd, GUILLERMO  Expected Discharge Date:   Principal Problem:<principal problem not specified>    Subjective:   Chief Complaint/Reason for Consult: Chest Pain     HPI:   Mr. Snow is a 78 year old male, known to Dr. Gotti in [place], who presented to the hospital with reports of CP. Symptoms reportedly began about 2 weeks ago. Constant pattern with aching sensation as description. Also reported left-sided head and arm numbness and weakness. ER lab work is unremarkable. Troponin is normal and BNP is normal. CT Head revealed no acute intracranial abnormality. Chest XR revealed no acute abnormal cardiopulmonary process. EKG revealed SR with no evidence of ST-Segment elevation or depression. Patient admitted to Hospital Medicine Team. [facility] consulted for cardiac evaluation of his CP.    Hospital Course:  3.9.24: NAD Noted. Vitals Stable, but hypertensive. On Heparin Infusion. Troponin values normal. Denies CP at current time.      PMH: Hypertension, Hyperlipidemia, CAD/Stent, ASCVD, Remote Tobacco Use, GERD, LUIS/CPAP, Diverticulitis   PSH: Back Surgery, Colonoscopy, LHC/PCI, Hernia Repair, Prostatectomy, Tonsillectomy, Total Knee Replacement, Total Hip Replacement, Neck Surgery, Rotator Cuff Repair, Penile Prosthesis   Family History: Mother- Heart Disease, Sister- Heart Disease  Social History: Tobacco- Remote Use, Alcohol- Negative, Substance Abuse- Negative     Previous Cardiac Diagnostics:   Echocardiogram (3.8.24):  Left Ventricle: The left ventricle is normal in size. Moderately increased ventricular mass. Moderately increased wall thickness. There is moderate concentric hypertrophy. Normal wall motion. There is normal  systolic function with a visually estimated ejection fraction of 60 - 65%. Grade I diastolic dysfunction.  Right Ventricle: Normal right ventricular cavity size. Systolic function is normal.  Aortic Valve: The aortic valve is structurally normal. Mildly calcified cusps. There is normal leaflet mobility. There is no stenosis. Aortic valve peak velocity is 1.37 m/s. Mean gradient is 4 mmHg. There is no significant regurgitation.  Mitral Valve: There is mild mitral annular calcification present. There is normal leaflet mobility. There is no stenosis. The mean pressure gradient across the mitral valve is 2 mmHg at a heart rate of  bpm. There is no significant regurgitation.  Tricuspid Valve: There is trace regurgitation.  Pulmonic Valve: There is no stenosis.    Echocardiogram (5.22.23):  The study quality is average.   Global left ventricular systolic function is normal. The left ventricular ejection fraction is 60-65%. The left ventricle diastolic function is impaired (Grade I) with normal left atrial pressure. Noted left ventricular hypertrophy. It is mild.   No obvious valvular dysfunction noted.    The pulmonary artery appears normal.      Cardiac PET (8.18.22):  This is a normal perfusion study, no perfusion defects noted. There is no evidence of ischemia.   This scan is suggestive of low risk for future cardiovascular events.   The left ventricular cavity is noted to be normal on the stress studies. The stress left ventricular ejection fraction was calculated to be 62% and left ventricular global function is normal. The rest left ventricular cavity is noted to be normal. The rest left ventricular ejection fraction was calculated to be 55% and rest left ventricular global function is normal.   When compared to the resting ejection fraction (55%), the stress ejection fraction (62%) has increased.   The study quality is good.      Carotid US (3.18.21):  The study quality is average.   1-39% stenosis in the proximal  right internal carotid artery based on Bluth Criteria.   1-39% stenosis in the proximal left internal carotid artery based on Bluth Criteria.   Antegrade right vertebral artery flow.   Antegrade left vertebral artery flow.      Coronary Angiogram (3.9.16):  LM: Normal, LAD: Normal with Patent Mid Existing Stent, LCX: Normal with OM1 Proximal 30% Stenosis/Previous Stent in OM2 Widely Patent, RCA: Previous Stent Proximal Region Widely Patent.  FFR Proximal RCA- 0.8- PCI RCA     Review of Systems   Cardiovascular:  Negative for chest pain.   Respiratory:  Negative for shortness of breath.    All other systems reviewed and are negative.    Objective:     Vital Signs (Most Recent):  Temp: 98 °F (36.7 °C) (03/09/24 0750)  Pulse: 78 (03/09/24 0750)  Resp: (!) 23 (03/08/24 1736)  BP: (!) 172/81 (03/09/24 0750)  SpO2: 96 % (03/09/24 0750) Vital Signs (24h Range):  Temp:  [97.7 °F (36.5 °C)-98.3 °F (36.8 °C)] 98 °F (36.7 °C)  Pulse:  [63-83] 78  Resp:  [15-24] 23  SpO2:  [92 %-96 %] 96 %  BP: (108-175)/(65-88) 172/81   Weight: 99.8 kg (220 lb)  Body mass index is 33.45 kg/m².  SpO2: 96 %       Intake/Output Summary (Last 24 hours) at 3/9/2024 1002  Last data filed at 3/9/2024 0523  Gross per 24 hour   Intake --   Output 200 ml   Net -200 ml     Lines/Drains/Airways       Peripheral Intravenous Line  Duration                  Peripheral IV - Single Lumen 03/08/24 1035 20 G Anterior;Left;Proximal Forearm <1 day                  Significant Labs:   Recent Results (from the past 72 hour(s))   EKG 12-lead    Collection Time: 03/08/24 10:24 AM   Result Value Ref Range    QRS Duration 94 ms    [facility] QTC Calculation 459 ms   POCT glucose    Collection Time: 03/08/24 10:29 AM   Result Value Ref Range    POCT Glucose 153 (H) 70 - 110 mg/dL   Comprehensive metabolic panel    Collection Time: 03/08/24 10:56 AM   Result Value Ref Range    Sodium Level 141 136 - 145 mmol/L    Potassium Level 4.0 3.5 - 5.1 mmol/L    Chloride 113 (H) 98 - 107  mmol/L    Carbon Dioxide 21 (L) 23 - 31 mmol/L    Glucose Level 116 (H) 82 - 115 mg/dL    Blood Urea Nitrogen 23.7 8.4 - 25.7 mg/dL    Creatinine 0.93 0.73 - 1.18 mg/dL    Calcium Level Total 8.9 8.8 - 10.0 mg/dL    Protein Total 6.7 5.8 - 7.6 gm/dL    Albumin Level 3.6 3.4 - 4.8 g/dL    Globulin 3.1 2.4 - 3.5 gm/dL    Albumin/Globulin Ratio 1.2 1.1 - 2.0 ratio    Bilirubin Total 0.8 <=1.5 mg/dL    Alkaline Phosphatase 84 40 - 150 unit/L    Alanine Aminotransferase 28 0 - 55 unit/L    Aspartate Aminotransferase 44 (H) 5 - 34 unit/L    eGFR >60 mls/min/1.73/m2   Troponin I #1    Collection Time: 03/08/24 10:56 AM   Result Value Ref Range    Troponin-I <0.010 0.000 - 0.045 ng/mL   B-Type natriuretic peptide (BNP)    Collection Time: 03/08/24 10:56 AM   Result Value Ref Range    Natriuretic Peptide 15.2 <=100.0 pg/mL   CBC with Differential    Collection Time: 03/08/24 10:56 AM   Result Value Ref Range    WBC 5.75 4.50 - 11.50 x10(3)/mcL    RBC 5.80 4.70 - 6.10 x10(6)/mcL    Hgb 16.8 14.0 - 18.0 g/dL    Hct 49.0 42.0 - 52.0 %    MCV 84.5 80.0 - 94.0 fL    MCH 29.0 27.0 - 31.0 pg    MCHC 34.3 33.0 - 36.0 g/dL    RDW 14.6 11.5 - 17.0 %    Platelet 143 130 - 400 x10(3)/mcL    MPV 9.9 7.4 - 10.4 fL    Neut % 61.9 %    Lymph % 26.3 %    Mono % 8.9 %    Eos % 1.7 %    Basophil % 0.7 %    Lymph # 1.51 0.6 - 4.6 x10(3)/mcL    Neut # 3.56 2.1 - 9.2 x10(3)/mcL    Mono # 0.51 0.1 - 1.3 x10(3)/mcL    Eos # 0.10 0 - 0.9 x10(3)/mcL    Baso # 0.04 <=0.2 x10(3)/mcL    IG# 0.03 0 - 0.04 x10(3)/mcL    IG% 0.5 %    NRBC% 0.0 %   Troponin I #2    Collection Time: 03/08/24  1:55 PM   Result Value Ref Range    Troponin-I <0.010 0.000 - 0.045 ng/mL   Echo    Collection Time: 03/08/24  5:14 PM   Result Value Ref Range    BSA 2.19 m2    LVOT stroke volume 85.16 cm3    LVIDd 4.50 3.5 - 6.0 cm    LV Systolic Volume 37.90 mL    LV Systolic Volume Index 17.8 mL/m2    LVIDs 3.10 2.1 - 4.0 cm    LV Diastolic Volume 92.40 mL    LV Diastolic Volume  Index 43.38 mL/m2    IVS 1.40 (A) 0.6 - 1.1 cm    LVOT diameter 2.10 cm    LVOT area 3.5 cm2    FS 31 28 - 44 %    Left Ventricle Relative Wall Thickness 0.62 cm    Posterior Wall 1.40 (A) 0.6 - 1.1 cm    LV mass 248.45 g    LV Mass Index 117 g/m2    MV Peak E Mendoza 0.40 m/s    TDI LATERAL 0.11 m/s    TDI SEPTAL 0.06 m/s    E/E' ratio 4.71 m/s    MV Peak A Mendoza 0.73 m/s    E/A ratio 0.55     E wave deceleration time 309.00 msec    LV SEPTAL E/E' RATIO 6.67 m/s    LV LATERAL E/E' RATIO 3.64 m/s    LVOT peak mendoza 1.12 m/s    Left Ventricular Outflow Tract Mean Velocity 0.85 cm/s    Left Ventricular Outflow Tract Mean Gradient 3.00 mmHg    RVDD 3.30 cm    LA size 3.70 cm    LA volume (mod) 44.60 cm3    LA Volume Index (Mod) 20.9 mL/m2    RA Major Axis 4.50 cm    RA Width 3.00 cm    AV mean gradient 4 mmHg    AV peak gradient 8 mmHg    Ao peak mendoza 1.37 m/s    Ao VTI 28.90 cm    LVOT peak VTI 24.60 cm    AV valve area 2.95 cm²    AV Velocity Ratio 0.82     AV index (prosthetic) 0.85     SEVEN by Velocity Ratio 2.83 cm²    MV mean gradient 2 mmHg    MV peak gradient 3 mmHg    MV valve area by continuity eq 4.44 cm2    MV VTI 19.2 cm    Mean e' 0.09 m/s    ZLVIDS -2.29     ZLVIDD -4.11    Troponin I    Collection Time: 03/08/24 10:56 PM   Result Value Ref Range    Troponin-I 0.026 0.000 - 0.045 ng/mL   CBC with Differential    Collection Time: 03/09/24  3:01 AM   Result Value Ref Range    WBC 6.51 4.50 - 11.50 x10(3)/mcL    RBC 5.98 4.70 - 6.10 x10(6)/mcL    Hgb 17.2 14.0 - 18.0 g/dL    Hct 51.3 42.0 - 52.0 %    MCV 85.8 80.0 - 94.0 fL    MCH 28.8 27.0 - 31.0 pg    MCHC 33.5 33.0 - 36.0 g/dL    RDW 14.6 11.5 - 17.0 %    Platelet 136 130 - 400 x10(3)/mcL    MPV 9.3 7.4 - 10.4 fL    Neut % 56.6 %    Lymph % 30.9 %    Mono % 8.8 %    Eos % 2.5 %    Basophil % 0.9 %    Lymph # 2.01 0.6 - 4.6 x10(3)/mcL    Neut # 3.69 2.1 - 9.2 x10(3)/mcL    Mono # 0.57 0.1 - 1.3 x10(3)/mcL    Eos # 0.16 0 - 0.9 x10(3)/mcL    Baso # 0.06 <=0.2  x10(3)/mcL    IG# 0.02 0 - 0.04 x10(3)/mcL    IG% 0.3 %    NRBC% 0.0 %    IPF 1.9 0.9 - 11.2 %   Comprehensive Metabolic Panel    Collection Time: 03/09/24  3:02 AM   Result Value Ref Range    Sodium Level 142 136 - 145 mmol/L    Potassium Level 4.3 3.5 - 5.1 mmol/L    Chloride 113 (H) 98 - 107 mmol/L    Carbon Dioxide 18 (L) 23 - 31 mmol/L    Glucose Level 86 82 - 115 mg/dL    Blood Urea Nitrogen 23.4 8.4 - 25.7 mg/dL    Creatinine 0.93 0.73 - 1.18 mg/dL    Calcium Level Total 8.9 8.8 - 10.0 mg/dL    Protein Total 6.7 5.8 - 7.6 gm/dL    Albumin Level 3.5 3.4 - 4.8 g/dL    Globulin 3.2 2.4 - 3.5 gm/dL    Albumin/Globulin Ratio 1.1 1.1 - 2.0 ratio    Bilirubin Total 0.6 <=1.5 mg/dL    Alkaline Phosphatase 76 40 - 150 unit/L    Alanine Aminotransferase 28 0 - 55 unit/L    Aspartate Aminotransferase 39 (H) 5 - 34 unit/L    eGFR >60 mls/min/1.73/m2   Troponin I    Collection Time: 03/09/24  3:02 AM   Result Value Ref Range    Troponin-I 0.019 0.000 - 0.045 ng/mL   APTT    Collection Time: 03/09/24  4:21 AM   Result Value Ref Range    PTT 33.8 (H) 23.2 - 33.7 seconds   Protime-INR    Collection Time: 03/09/24  4:21 AM   Result Value Ref Range    PT 14.1 12.5 - 14.5 seconds    INR 1.1 <=1.3     Telemetry:  Sinus Rhythm     Physical Exam  Vitals and nursing note reviewed.   Constitutional:       Appearance: Normal appearance.   HENT:      Head: Normocephalic.      Mouth/Throat:      Mouth: Mucous membranes are moist.      Pharynx: Oropharynx is clear.   Cardiovascular:      Rate and Rhythm: Normal rate and regular rhythm.      Heart sounds: Normal heart sounds.   Pulmonary:      Effort: Pulmonary effort is normal. No respiratory distress.   Abdominal:      Palpations: Abdomen is soft.   Musculoskeletal:         General: Normal range of motion.      Cervical back: Neck supple.   Skin:     General: Skin is warm and dry.   Neurological:      Mental Status: He is alert. Mental status is at baseline.   Psychiatric:          Behavior: Behavior normal.       Current Inpatient Medications:    Current Facility-Administered Medications:     acetaminophen tablet 650 mg, 650 mg, Oral, Q8H PRN, Garima Green, AGACNP-BC    acetaminophen tablet 650 mg, 650 mg, Oral, Q4H PRN, Garima Green, AGACNP-BC, 650 mg at 03/08/24 1825    aspirin EC tablet 81 mg, 81 mg, Oral, Daily, Westley, Rein T, FNP, 81 mg at 03/09/24 0854    carvediloL tablet 6.25 mg, 6.25 mg, Oral, BID WM, Westley, Rein T, FNP, 6.25 mg at 03/09/24 0854    ezetimibe tablet 10 mg, 10 mg, Oral, QHS, Westley, Rein T, FNP, 10 mg at 03/08/24 2136    fenofibrate tablet 145 mg, 145 mg, Oral, Daily, Westley, Rein T, FNP, 145 mg at 03/09/24 0854    hydrALAZINE injection 10 mg, 10 mg, Intravenous, Q4H PRN, Garima Green, AGACNP-BC, 10 mg at 03/08/24 1738    hydrALAZINE tablet 75 mg, 75 mg, Oral, Q8H, Cammy Tam MD    isosorbide mononitrate 24 hr tablet 30 mg, 30 mg, Oral, Daily, Westley, Rein T, FNP, 30 mg at 03/09/24 0854    nitroGLYCERIN SL tablet 0.4 mg, 0.4 mg, Sublingual, Q5 Min PRN, Westley, Rein T, FNP    ondansetron injection 4 mg, 4 mg, Intravenous, Q8H PRN, Garima Green, AGACNP-BC    rivaroxaban tablet 2.5 mg, 2.5 mg, Oral, BID WM, Westley, Rein T, FNP  VTE Risk Mitigation (From admission, onward)           Ordered     IP VTE HIGH RISK PATIENT  Once         03/08/24 1351     Place sequential compression device  Until discontinued         03/08/24 1351                  Assessment:   Chest Pain (No CP at Current Time)- CP He Experienced Atypical for his typical angina    - Troponin Values Normal    - Treated with Heparin Infusion (Will DC This AM)  CAD    - PCI RCA Balloon Angioplasty/JACKY (3.9.16) (Patent Existing Mid LAD/Distal Stents, Patent OM2 Stent)    - PET (8.18.22): No Ischemic/Low Risk    - EF 60-65%  Hypertension/Hypertensive Heart Disease- (BP Above Goal)    - Mild LVH  Hyperlipidemia  Left Sided Paresthesias    - CT Head Negative  Chronic Kidney Disease Stage II  Remote  Tobacco Use  GERD  Elevated BMI  History of Diverticulitis  ASCVD    - On Xarelto 2.5 Mg/Aspirin 81 Mg Outpatient  LUIS/CPAP  OA  Depression  Chronic Lung Disease/Interstitial Pneumonitis      Plan:   Discontinue Heparin Infusion.  Continue Aspirin 81 Mg Daily. Resume Home Xarelto 2.5 Mg PO BID.  Optimize Antihypertensives  No plan for cath this hospitalization. Do not feel his CP is anginal related, different in nature than previous episodes & Normal Cardiac Enzymes.  Plan outpatient Cardiac PET and follow up with [facility] for Results    ANDREA Smith  Cardiology  [facility]  03/09/2024

## 2024-03-09 NOTE — NURSING
Nurses Note -- 4 Eyes      3/8/2024   8:10 PM      Skin assessed during: Admit      [x] No Altered Skin Integrity Present    []Prevention Measures Documented      [] Yes- Altered Skin Integrity Present or Discovered   [] LDA Added if Not in Epic (Describe Wound)   [] New Altered Skin Integrity was Present on Admit and Documented in LDA   [] Wound Image Taken    Wound Care Consulted? No    Attending Nurse:  Stephan Burt RN     Second RN/Staff Member:  Kenyetta Low LPN

## 2024-03-09 NOTE — PROGRESS NOTES
[facility] Hospital Medicine Progress Note        Chief Complaint: Chest Pain (Pt. C/o intermittent CP x 2 weeks worsening today. [facility] Cardiac stents x 6, Cardiologist Dr. Cleveland. )        Patient information was obtained from patient, patient's family, past medical records and ER records.        HISTORY OF PRESENT ILLNESS:   Augusto Snow is a 78 y.o. male with a PMHx of HTN, HLD, CKD stage 2, vitamin-D deficiency, LUIS on CPAP, CAD s/p PCI x6, osteoarthritis, depression, UIP who presented to [facility] on 3/8/2024 with c/o intermittent chest pain x2 weeks.  Chest pain described as a constant ache, radiates down the left arm and under the right breast.  He also endorsed left-sided numbness to his head and left arm weakness.     Upon presentation to ED, vital signed included /79, HR 80, RR 18, SpO2 94% on room air, temperature 97.7° F.  Labs notable for chloride 113, CO2 21, glucose 116, AST 44.  BNP normal.  Initial troponin undetectable.  CXR negative for acute cardiopulmonary process, findings of chronic lung disease noted.  CT head without contrast negative for acute intracranial abnormality.  He received aspirin 325 mg p.o., hydralazine 25 mg p.o. and normal saline 500 mL IV bolus in ED. Cardiology service consulted in ED. Admitted to hospital medicine service for further medical management.    B. Physical exam:  Gen- obese male, not in distress  Heent- perrla  Cvs- RRR, S1S2 , no mrg  Resp- CTAB/L , no wheezes, rhonchi  GI- obese abdomen, soft, not tender, normal BS   - normal genitalia   SAADIA- moving all limbs symmetrically, no deficits, no facial asymmetry   Neuro- AXO X 3,, CN 2-12 normal, motor is symmetrical, no sensory deficits   Psych- appropriate affect and mood      ASSESSMENT & PLAN:    Unstable Angina   Typical chest pain   -- Hx of CAD s/p PCI x6   Hypertensive urgency   Depression   History of essential hypertension, hyperlipidemia, CKD stage II, LUIS on CPAP,  osteoarthritis, vitamin-D deficiency     Plan:  Initial trop negative   Trend troponins x 3  Cardiology consulted in ED, appreciate recommendations  Cardiac monitoring  Echocardiogram pending  P.r.n. antihypertensives   Resume appropriate home medications once updated   I have no concerns for acute cva at this time.   Labs in a.m.        VTE Prophylaxis: Lovenox    VITAL SIGNS: 24 HRS MIN & MAX LAST   Temp  Min: 97.8 °F (36.6 °C)  Max: 98.3 °F (36.8 °C) 98 °F (36.7 °C)   BP  Min: 108/65  Max: 175/86 (!) 172/81   Pulse  Min: 63  Max: 83  78   Resp  Min: 15  Max: 24 (!) 23   SpO2  Min: 92 %  Max: 96 % 96 %     I have reviewed the following labs:  Recent Labs   Lab 03/08/24  1056 03/09/24  0301   WBC 5.75 6.51   RBC 5.80 5.98   HGB 16.8 17.2   HCT 49.0 51.3   MCV 84.5 85.8   MCH 29.0 28.8   MCHC 34.3 33.5   RDW 14.6 14.6    136   MPV 9.9 9.3     Recent Labs   Lab 03/08/24  1056 03/09/24  0302    142   K 4.0 4.3   CO2 21* 18*   BUN 23.7 23.4   CREATININE 0.93 0.93   CALCIUM 8.9 8.9   ALBUMIN 3.6 3.5   ALKPHOS 84 76   ALT 28 28   AST 44* 39*   BILITOT 0.8 0.6     Microbiology Results (last 7 days)       ** No results found for the last 168 hours. **             See below for Radiology    Scheduled Med:   aspirin  81 mg Oral Daily    carvediloL  6.25 mg Oral BID WM    ezetimibe  10 mg Oral QHS    fenofibrate  145 mg Oral Daily    hydrALAZINE  75 mg Oral Q8H    isosorbide mononitrate  30 mg Oral Daily    rivaroxaban  2.5 mg Oral BID WM      Continuous Infusions:     PRN Meds:  acetaminophen, acetaminophen, hydrALAZINE, nitroGLYCERIN, ondansetron     Assessment/Plan:      VTE prophylaxis:     Patient condition:  Stable/Fair/Guarded/ Serious/ Critical    Anticipated discharge and Disposition:         All diagnosis and differential diagnosis have been reviewed; assessment and plan has been documented; I have personally reviewed the labs and test results that are presently available; I have reviewed the patients  medication list; I have reviewed the consulting providers response and recommendations. I have reviewed or attempted to review medical records based upon their availability    All of the patient's questions have been  addressed and answered. Patient's is agreeable to the above stated plan. I will continue to monitor closely and make adjustments to medical management as needed.  _____________________________________________________________________    Nutrition Status:    Radiology:  I have personally reviewed the following imaging and agree with the radiologist.     CT Head Without Contrast  Narrative: EXAMINATION:  CT HEAD WITHOUT CONTRAST    CLINICAL HISTORY:  Stroke-like symptoms.    TECHNIQUE:  Axial CT images were obtained of the brain without intravenous contrast.  Coronal and sagittal reformations were obtained.  Automated exposure control utilized to reduce radiation dose.  Total exam DLP is 938 mGy cm.    COMPARISON:  03/08/2024    FINDINGS:  Gray-white matter differentiation is within normal limits. There is chronic involutional change.  There is chronic white matter microischemic change.  There is intracranial atherosclerosis.  No acute intracranial hemorrhage, extra-axial fluid collection, hydrocephalus, mass effect, or midline shift is noted.  No large vessel territory acute ischemia is identified.  Visualized paranasal sinuses are clear.  Visualized mastoid air cells are clear.  No acute displaced calvarial fracture is identified.  Impression: 1. No acute intracranial abnormalities identified.  2. [place] concordance.    Electronically signed by: Jake Orellana MD  Date:    03/09/2024  Time:    10:29      Cammy Tam MD  Department of Hospital Medicine   [facility]   03/09/2024

## 2024-03-10 VITALS
BODY MASS INDEX: 33.34 KG/M2 | OXYGEN SATURATION: 94 % | HEART RATE: 79 BPM | HEIGHT: 68 IN | RESPIRATION RATE: 18 BRPM | DIASTOLIC BLOOD PRESSURE: 67 MMHG | WEIGHT: 220 LBS | SYSTOLIC BLOOD PRESSURE: 136 MMHG | TEMPERATURE: 98 F

## 2024-03-10 PROBLEM — R07.9 CHEST PAIN: Status: ACTIVE | Noted: 2024-03-10

## 2024-03-10 PROCEDURE — 25000003 PHARM REV CODE 250: Performed by: NURSE PRACTITIONER

## 2024-03-10 PROCEDURE — 25000003 PHARM REV CODE 250: Performed by: INTERNAL MEDICINE

## 2024-03-10 RX ORDER — SERTRALINE HYDROCHLORIDE 50 MG/1
50 TABLET, FILM COATED ORAL DAILY
Qty: 90 TABLET | Refills: 3 | Status: SHIPPED | OUTPATIENT
Start: 2024-03-10 | End: 2025-03-10

## 2024-03-10 RX ORDER — FENOFIBRATE 145 MG/1
145 TABLET, FILM COATED ORAL DAILY
Qty: 90 TABLET | Refills: 3 | Status: SHIPPED | OUTPATIENT
Start: 2024-03-11 | End: 2025-03-11

## 2024-03-10 RX ORDER — CARVEDILOL 6.25 MG/1
6.25 TABLET ORAL 2 TIMES DAILY WITH MEALS
Qty: 180 TABLET | Refills: 3 | Status: SHIPPED | OUTPATIENT
Start: 2024-03-10 | End: 2025-03-10

## 2024-03-10 RX ORDER — VALSARTAN 160 MG/1
160 TABLET ORAL DAILY
Qty: 90 TABLET | Refills: 3 | Status: SHIPPED | OUTPATIENT
Start: 2024-03-11 | End: 2025-03-11

## 2024-03-10 RX ORDER — VALSARTAN 80 MG/1
160 TABLET ORAL DAILY
Status: DISCONTINUED | OUTPATIENT
Start: 2024-03-10 | End: 2024-03-10 | Stop reason: HOSPADM

## 2024-03-10 RX ORDER — HYDRALAZINE HYDROCHLORIDE 50 MG/1
50 TABLET, FILM COATED ORAL 3 TIMES DAILY
Qty: 270 TABLET | Refills: 3 | Status: SHIPPED | OUTPATIENT
Start: 2024-03-10 | End: 2025-03-10

## 2024-03-10 RX ADMIN — VALSARTAN 160 MG: 80 TABLET, FILM COATED ORAL at 08:03

## 2024-03-10 RX ADMIN — HYDRALAZINE HYDROCHLORIDE 75 MG: 25 TABLET, FILM COATED ORAL at 06:03

## 2024-03-10 RX ADMIN — RIVAROXABAN 2.5 MG: 2.5 TABLET, FILM COATED ORAL at 08:03

## 2024-03-10 RX ADMIN — ACETAMINOPHEN 650 MG: 325 TABLET, FILM COATED ORAL at 10:03

## 2024-03-10 RX ADMIN — FENOFIBRATE 145 MG: 145 TABLET, FILM COATED ORAL at 08:03

## 2024-03-10 RX ADMIN — ISOSORBIDE MONONITRATE 30 MG: 30 TABLET, EXTENDED RELEASE ORAL at 08:03

## 2024-03-10 RX ADMIN — ASPIRIN 81 MG: 81 TABLET, COATED ORAL at 08:03

## 2024-03-10 RX ADMIN — CARVEDILOL 6.25 MG: 3.12 TABLET, FILM COATED ORAL at 08:03

## 2024-03-10 NOTE — NURSING
Pt AAOx4, VSS, educated on discharge instructions, new medications, follow up appointments, and signs and symptoms to return to the ED with. All questions answered. Pt verbalized understanding. Peripheral IV discontinued and gauze/tape applied to site with no signs of bleeding or swelling noted. Telemetry monitor discontinued and returned to box. Pt wheeled down via wheelchair to ed entrance to be brought home by daughter.

## 2024-03-10 NOTE — DISCHARGE SUMMARY
[facility] Hospital Medicine Discharge Summary    Admit Date: 3/8/2024  Discharge Date and Time: 3/10/085468:16 AM  Admitting Physician:  Team  Discharging Physician: Cammy Tam MD.  Primary Care Physician: Chanda Lloyd NP  Consults: Cardiology and Hospital Medicine    Discharge Diagnoses:   Unstable Angina   Typical chest pain   -- Hx of CAD s/p PCI x6   Hypertensive urgency   Depression   History of essential hypertension, hyperlipidemia, CKD stage II, LUIS on CPAP, osteoarthritis, vitamin-D deficiency       Hospital Course:   Chief Complaint: Chest Pain (Pt. C/o intermittent CP x 2 weeks worsening today. [facility] Cardiac stents x 6, Cardiologist Dr. Cleveland. )        Patient information was obtained from patient, patient's family, past medical records and ER records.        HISTORY OF PRESENT ILLNESS:   Augusto Snow is a 78 y.o. male with a PMHx of HTN, HLD, CKD stage 2, vitamin-D deficiency, LUIS on CPAP, CAD s/p PCI x6, osteoarthritis, depression, UIP who presented to [facility] on 3/8/2024 with c/o intermittent chest pain x2 weeks.  Chest pain described as a constant ache, radiates down the left arm and under the right breast.  He also endorsed left-sided numbness to his head and left arm weakness.     Upon presentation to ED, vital signed included /79, HR 80, RR 18, SpO2 94% on room air, temperature 97.7° F.  Labs notable for chloride 113, CO2 21, glucose 116, AST 44.  BNP normal.  Initial troponin undetectable.  CXR negative for acute cardiopulmonary process, findings of chronic lung disease noted.  CT head without contrast negative for acute intracranial abnormality.  He received aspirin 325 mg p.o., hydralazine 25 mg p.o. and normal saline 500 mL IV bolus in ED. Cardiology service consulted in ED. Admitted to hospital medicine service for further medical management.       Patient was initially started on heparin drip.  Cardiology evaluated patient and recommended to  discontinue heparin drip and follow-up outpatient for outpatient PET scan.  Chest pain currently resolved.  Troponin was trended and was negative.  Echo reviewed with no regional wall motion abnormality.    Patient discharged home    B. Physical exam:  Gen- obese male, not in distress  Heent- perrla  Cvs- RRR, S1S2 , no mrg  Resp- CTAB/L , no wheezes, rhonchi  GI- obese abdomen, soft, not tender, normal BS   - normal genitalia   SAADIA- moving all limbs symmetrically, no deficits, no facial asymmetry   Neuro- AXO X 3,, CN 2-12 normal, motor is symmetrical, no sensory deficits   Psych- appropriate affect and mood          Pt was seen and examined on the day of discharge  Vitals:  VITAL SIGNS: 24 HRS MIN & MAX LAST   Temp  Min: 97.8 °F (36.6 °C)  Max: 98.8 °F (37.1 °C) 97.8 °F (36.6 °C)   BP  Min: 121/68  Max: 167/73 136/67   Pulse  Min: 68  Max: 79  79   Resp  Min: 18  Max: 18 18   SpO2  Min: 93 %  Max: 95 % (!) 94 %         Procedures Performed: No admission procedures for hospital encounter.     Significant Diagnostic Studies: See Full reports for all details    Recent Labs   Lab 03/08/24  1056 03/09/24  0301   WBC 5.75 6.51   RBC 5.80 5.98   HGB 16.8 17.2   HCT 49.0 51.3   MCV 84.5 85.8   MCH 29.0 28.8   MCHC 34.3 33.5   RDW 14.6 14.6    136   MPV 9.9 9.3       Recent Labs   Lab 03/08/24  1056 03/09/24  0302    142   K 4.0 4.3   CO2 21* 18*   BUN 23.7 23.4   CREATININE 0.93 0.93   CALCIUM 8.9 8.9   ALBUMIN 3.6 3.5   ALKPHOS 84 76   ALT 28 28   AST 44* 39*   BILITOT 0.8 0.6        Microbiology Results (last 7 days)       ** No results found for the last 168 hours. **             CT Head Without Contrast  Narrative: EXAMINATION:  CT HEAD WITHOUT CONTRAST    CLINICAL HISTORY:  Stroke-like symptoms.    TECHNIQUE:  Axial CT images were obtained of the brain without intravenous contrast.  Coronal and sagittal reformations were obtained.  Automated exposure control utilized to reduce radiation dose.  Total  exam DLP is 938 mGy cm.    COMPARISON:  03/08/2024    FINDINGS:  Gray-white matter differentiation is within normal limits. There is chronic involutional change.  There is chronic white matter microischemic change.  There is intracranial atherosclerosis.  No acute intracranial hemorrhage, extra-axial fluid collection, hydrocephalus, mass effect, or midline shift is noted.  No large vessel territory acute ischemia is identified.  Visualized paranasal sinuses are clear.  Visualized mastoid air cells are clear.  No acute displaced calvarial fracture is identified.  Impression: 1. No acute intracranial abnormalities identified.  2. [place]k concordance.    Electronically signed by: Jake Orellana MD  Date:    03/09/2024  Time:    10:29         Medication List        START taking these medications      carvediloL 6.25 MG tablet  Commonly known as: COREG  Take 1 tablet (6.25 mg total) by mouth 2 (two) times daily with meals.     fenofibrate 145 MG tablet  Commonly known as: TRICOR  Take 1 tablet (145 mg total) by mouth once daily.  Start taking on: March 11, 2024     valsartan 160 MG tablet  Commonly known as: DIOVAN  Take 1 tablet (160 mg total) by mouth once daily.  Start taking on: March 11, 2024            CHANGE how you take these medications      diazePAM 10 MG Tab  Commonly known as: VALIUM  TAKE ONE (1) TABLET BY MOUTH EVERY EIGHT (8) HOURS AS NEEDED FOR ANXIETY  What changed: See the new instructions.     hydrALAZINE 50 MG tablet  Commonly known as: APRESOLINE  Take 1 tablet (50 mg total) by mouth 3 (three) times daily.  What changed:   medication strength  See the new instructions.            CONTINUE taking these medications      aspirin 81 MG EC tablet  Commonly known as: ECOTRIN     cholecalciferol (vitamin D3) 25 mcg (1,000 unit) capsule  Commonly known as: VITAMIN D3     cyanocobalamin (vitamin B-12) 50 mcg tablet     DEXILANT 60 mg capsule  Generic drug: dexlansoprazole  TAKE ONE CAPSULE BY MOUTH EVERY  DAY     ezetimibe 10 mg tablet  Commonly known as: ZETIA     HYDROcodone-acetaminophen  mg per tablet  Commonly known as: NORCO  Take 1 tablet by mouth every 6 (six) hours as needed for Pain.     isosorbide mononitrate 30 MG 24 hr tablet  Commonly known as: IMDUR     multivitamin with minerals tablet     nitroGLYCERIN 0.4 MG SL tablet  Commonly known as: NITROSTAT     rosuvastatin 40 MG Tab  Commonly known as: CRESTOR     sertraline 50 MG tablet  Commonly known as: ZOLOFT  Take 1 tablet (50 mg total) by mouth once daily.     triamcinolone acetonide 0.1% 0.1 % cream  Commonly known as: KENALOG     XARELTO 2.5 mg Tab  Generic drug: rivaroxaban            STOP taking these medications      amLODIPine 5 MG tablet  Commonly known as: NORVASC     buPROPion 150 MG TB24 tablet  Commonly known as: WELLBUTRIN XL     butalbital-aspirin-caffeine -40 mg -40 mg Cap  Commonly known as: FIORINAL     fenofibrate micronized 200 MG Cap  Commonly known as: LOFIBRA     icosapent ethyL 1 gram Cap  Commonly known as: VASCEPA     lysine 1,000 mg Tab     niacin 500 MG Cpsr     potassium gluconate 595 mg (99 mg) Tab     TRINTELLIX 10 mg Tab  Generic drug: vortioxetine               Where to Get Your Medications        These medications were sent to [id number] - [address] AT [facility] [address]  [address] [place] [id number]      Phone: [phone]   carvediloL 6.25 MG tablet  fenofibrate 145 MG tablet  hydrALAZINE 50 MG tablet  sertraline 50 MG tablet  valsartan 160 MG tablet          Explained in detail to the patient about the discharge plan, medications, and follow-up visits. Pt understands and agrees with the treatment plan  Discharge Disposition: Home or Self Care   Discharged Condition: stable  Diet-   Dietary Orders (From admission, onward)       Start     Ordered    03/08/24 1420  Diet heart healthy  Diet effective now         03/08/24 1414                    Medications Per [place] med rec  Activities as tolerated   Follow-up Information       Chanda Lloyd, NP. Schedule an appointment as soon as possible for a visit in 2 week(s).    Specialty: Internal Medicine  Why: We will call you with an appointment date and time this week.  Contact information:  [facility] [address]  [phone]               Ubaldo Cleveland MD. Schedule an appointment as soon as possible for a visit in 2 week(s).    Specialties: Cardiovascular Disease, Cardiology  Why: We will call you with an appointment date and time this week.  Contact information:  [address] [place] [address]  [phone]                           For further questions contact hospitalist office    Discharge time 33 minutes    For worsening symptoms, chest pain, shortness of breath, increased abdominal pain, high grade fever, stroke or stroke like symptoms, immediately go to the nearest Emergency Room or call 911 as soon as possible.      Cammy Powell M.D on 3/10/2024. at 11:16 AM.

## 2024-03-11 ENCOUNTER — PATIENT OUTREACH (OUTPATIENT)
Dept: ADMINISTRATIVE | Facility: CLINIC | Age: 79
End: 2024-03-11
Payer: MEDICARE

## 2024-03-11 NOTE — PROGRESS NOTES
C3 nurse attempted to contact Augusto Snow  for a TCC post hospital discharge follow up call. No answer. Left voicemail with callback information. The patient does not have a scheduled HOSFU appointment. Pt has [facility] p[facility].

## 2024-03-11 NOTE — PROGRESS NOTES
C3 nurse attempted to contact Augusto Snow  for a TCC post hospital discharge follow up call. The patient is unable to conduct the call @ this time. Pt stated will contact me on tomorrow with list of medications.    The patient has a scheduled HOS appointment with Ubaldo Cleveland MD 3/20 @8:30am.

## 2024-03-12 NOTE — PROGRESS NOTES
C3 nurse spoke with Augusto Snow and daughter Cecelia for a TCC post hospital discharge follow up call. The patient has a scheduled HOSFU appointment with Ubaldo Cleveland MD 3/20 @8:30am

## 2024-03-12 NOTE — PROGRESS NOTES
C3 nurse attempted to contact Augusto Snow  for a TCC post hospital discharge follow up call. The patient is unable to conduct the call @ this time. Pt stated will have his daughter contact me on today or tomorrow with list of medications.    The patient has a scheduled HOS appointment with Ubaldo Cleveland MD 3/20 @8:30am.

## 2024-03-12 NOTE — TELEPHONE ENCOUNTER
Pt daughter stated pt is taking Trintellix 5mg daily per PCP orders and will not discontinue taking medication.

## 2024-04-09 ENCOUNTER — HOSPITAL ENCOUNTER (EMERGENCY)
Facility: HOSPITAL | Age: 79
Discharge: HOME | End: 2024-04-09
Attending: FAMILY MEDICINE
Payer: MEDICARE

## 2024-04-09 VITALS
SYSTOLIC BLOOD PRESSURE: 158 MMHG | OXYGEN SATURATION: 95 % | HEIGHT: 68 IN | TEMPERATURE: 98 F | BODY MASS INDEX: 34.86 KG/M2 | RESPIRATION RATE: 21 BRPM | WEIGHT: 230 LBS | DIASTOLIC BLOOD PRESSURE: 72 MMHG | HEART RATE: 65 BPM

## 2024-04-09 DIAGNOSIS — I95.9 HYPOTENSION: ICD-10-CM

## 2024-04-09 DIAGNOSIS — R42 DIZZINESS: ICD-10-CM

## 2024-04-09 DIAGNOSIS — I95.2 HYPOTENSION DUE TO DRUGS: Primary | ICD-10-CM

## 2024-04-09 LAB
ANION GAP SERPL CALC-SCNC: 11 MEQ/L
BASOPHILS # BLD AUTO: 0.03 X10(3)/MCL
BASOPHILS NFR BLD AUTO: 0.4 %
BUN SERPL-MCNC: 34.8 MG/DL (ref 8.4–25.7)
CALCIUM SERPL-MCNC: 9.5 MG/DL (ref 8.8–10)
CHLORIDE SERPL-SCNC: 110 MMOL/L (ref 98–107)
CO2 SERPL-SCNC: 24 MMOL/L (ref 23–31)
CREAT SERPL-MCNC: 1.78 MG/DL (ref 0.73–1.18)
CREAT/UREA NIT SERPL: 20
D DIMER PPP IA.FEU-MCNC: 0.38 UG/ML FEU (ref 0–0.5)
EOSINOPHIL # BLD AUTO: 0.16 X10(3)/MCL (ref 0–0.9)
EOSINOPHIL NFR BLD AUTO: 2 %
ERYTHROCYTE [DISTWIDTH] IN BLOOD BY AUTOMATED COUNT: 14.5 % (ref 11.5–17)
GFR SERPLBLD CREATININE-BSD FMLA CKD-EPI: 38 MLS/MIN/1.73/M2
GLUCOSE SERPL-MCNC: 89 MG/DL (ref 82–115)
HCT VFR BLD AUTO: 49.3 % (ref 42–52)
HGB BLD-MCNC: 15.8 G/DL (ref 14–18)
IMM GRANULOCYTES # BLD AUTO: 0.02 X10(3)/MCL (ref 0–0.04)
IMM GRANULOCYTES NFR BLD AUTO: 0.3 %
LYMPHOCYTES # BLD AUTO: 1.97 X10(3)/MCL (ref 0.6–4.6)
LYMPHOCYTES NFR BLD AUTO: 24.6 %
MAGNESIUM SERPL-MCNC: 2.1 MG/DL (ref 1.6–2.6)
MCH RBC QN AUTO: 29.2 PG (ref 27–31)
MCHC RBC AUTO-ENTMCNC: 32 G/DL (ref 33–36)
MCV RBC AUTO: 91 FL (ref 80–94)
MONOCYTES # BLD AUTO: 0.73 X10(3)/MCL (ref 0.1–1.3)
MONOCYTES NFR BLD AUTO: 9.1 %
NEUTROPHILS # BLD AUTO: 5.09 X10(3)/MCL (ref 2.1–9.2)
NEUTROPHILS NFR BLD AUTO: 63.6 %
PLATELET # BLD AUTO: 164 X10(3)/MCL (ref 130–400)
PMV BLD AUTO: 9.4 FL (ref 7.4–10.4)
POC CARDIAC TROPONIN I: 0 NG/ML (ref 0–0.08)
POC CARDIAC TROPONIN I: 0.01 NG/ML (ref 0–0.08)
POTASSIUM SERPL-SCNC: 4.3 MMOL/L (ref 3.5–5.1)
RBC # BLD AUTO: 5.42 X10(6)/MCL (ref 4.7–6.1)
SAMPLE: NORMAL
SAMPLE: NORMAL
SODIUM SERPL-SCNC: 145 MMOL/L (ref 136–145)
TSH SERPL-ACNC: 2.44 UIU/ML (ref 0.35–4.94)
WBC # SPEC AUTO: 8 X10(3)/MCL (ref 4.5–11.5)

## 2024-04-09 PROCEDURE — 93005 ELECTROCARDIOGRAM TRACING: CPT

## 2024-04-09 PROCEDURE — 83735 ASSAY OF MAGNESIUM: CPT | Performed by: FAMILY MEDICINE

## 2024-04-09 PROCEDURE — 85379 FIBRIN DEGRADATION QUANT: CPT | Performed by: FAMILY MEDICINE

## 2024-04-09 PROCEDURE — 96360 HYDRATION IV INFUSION INIT: CPT

## 2024-04-09 PROCEDURE — 85025 COMPLETE CBC W/AUTO DIFF WBC: CPT | Performed by: FAMILY MEDICINE

## 2024-04-09 PROCEDURE — 25000003 PHARM REV CODE 250: Performed by: FAMILY MEDICINE

## 2024-04-09 PROCEDURE — 84443 ASSAY THYROID STIM HORMONE: CPT | Performed by: FAMILY MEDICINE

## 2024-04-09 PROCEDURE — 93010 ELECTROCARDIOGRAM REPORT: CPT | Mod: ,,, | Performed by: INTERNAL MEDICINE

## 2024-04-09 PROCEDURE — 80048 BASIC METABOLIC PNL TOTAL CA: CPT | Performed by: FAMILY MEDICINE

## 2024-04-09 PROCEDURE — 84484 ASSAY OF TROPONIN QUANT: CPT

## 2024-04-09 PROCEDURE — 99285 EMERGENCY DEPT VISIT HI MDM: CPT | Mod: 25

## 2024-04-09 RX ADMIN — SODIUM CHLORIDE 1000 ML: 9 INJECTION, SOLUTION INTRAVENOUS at 02:04

## 2024-04-09 NOTE — ED PROVIDER NOTES
Encounter Date: 4/9/2024       History     Chief Complaint   Patient presents with    Hypotension     Reports he was in the hospital 3 weeks ago and they increased his blood pressure medication. Reports he blood pressure was low today at physical therapy. Reports he feels weak and dizzy     78-year-old male presents with low blood pressure dizziness lightheadedness says he was in the hospital couple weeks ago in his medicines were adjusted they increased his blood pressure medicines he feels now it is too high his blood pressure is running low he feels weak and lightheaded worse with changes in position his vital signs are stable now EKGs shows no acute changes we will give him some fluids do a cardiac workup consider adjusting his medications if everything looks good until he can follow up with the PCP we will discussed all results with the patient when they come back and make a plan in        Review of patient's allergies indicates:   Allergen Reactions    Amlodipine Itching     Other reaction(s): Propensity to adverse reactions to drug    Atorvastatin      Other reaction(s): body aches    Metoprolol      Other reaction(s): body aches    Morphine Itching     Other reaction(s): Hallucinations    Ranitidine      Other reaction(s): Propensity to adverse reactions to drug    Grass pollen-june grass standard Rash     Past Medical History:   Diagnosis Date    Arthritis     Heart attack     High cholesterol     Hypertension     Sleep apnea     wears CPAP     Past Surgical History:   Procedure Laterality Date    ANGIOGRAPHY      BACK SURGERY      x 2    COLONOSCOPY  06/01/2016    coronary stents      6  stents    FLUOROSCOPY      GANGLION CYST EXCISION      HERNIA REPAIR      LAPAROSCOPIC CHOLECYSTECTOMY N/A 11/18/2022    Procedure: CHOLECYSTECTOMY, LAPAROSCOPIC;  Surgeon: Tito Palomino MD;  Location: [facility];  Service: General;  Laterality: N/A;    NECK SURGERY      plates /screws    PENILE PROSTHESIS IMPLANT       PROSTATECTOMY      ROTATOR CUFF REPAIR Left     TONSILLECTOMY      TOTAL HIP ARTHROPLASTY Bilateral     TOTAL KNEE ARTHROPLASTY Bilateral 12/17/2018     Family History   Problem Relation Age of Onset    Hyperlipidemia Mother     Heart failure Mother     Hyperlipidemia Father     Heart attack Sister     Coronary artery disease Sister     Cancer Brother      Social History     Tobacco Use    Smoking status: Never    Smokeless tobacco: Never   Substance Use Topics    Alcohol use: Not Currently    Drug use: Never     Review of Systems   Neurological:  Positive for dizziness and light-headedness.   All other systems reviewed and are negative.      Physical Exam     Initial Vitals [04/09/24 1410]   BP Pulse Resp Temp SpO2   (!) 100/55 72 20 97.7 °F (36.5 °C) (!) 94 %      MAP       --         Physical Exam    Nursing note and vitals reviewed.  Constitutional: He appears well-developed and well-nourished. He is active.   HENT:   Head: Normocephalic and atraumatic.   Eyes: Conjunctivae, EOM and lids are normal. Pupils are equal, round, and reactive to light.   Neck: Trachea normal and phonation normal. Neck supple. No thyroid mass present.   Normal range of motion.  Cardiovascular:  Normal rate, regular rhythm, normal heart sounds and normal pulses.           Pulmonary/Chest: Breath sounds normal.   Abdominal: Abdomen is soft. Bowel sounds are normal.   Musculoskeletal:         General: Normal range of motion.      Cervical back: Normal range of motion and neck supple.     Neurological: He is alert and oriented to person, place, and time. He has normal strength and normal reflexes.   Skin: Skin is warm and intact.   Psychiatric: He has a normal mood and affect. His speech is normal and behavior is normal. Judgment and thought content normal. Cognition and memory are normal.         ED Course   Procedures  Labs Reviewed   BASIC METABOLIC PANEL - Abnormal; Notable for the following components:       Result Value    Chloride  110 (*)     Blood Urea Nitrogen 34.8 (*)     Creatinine 1.78 (*)     All other components within normal limits   CBC WITH DIFFERENTIAL - Abnormal; Notable for the following components:    MCHC 32.0 (*)     All other components within normal limits   D DIMER, QUANTITATIVE - Normal   TSH - Normal   MAGNESIUM - Normal   CBC W/ AUTO DIFFERENTIAL    Narrative:     The following orders were created for panel order CBC Auto Differential.  Procedure                               Abnormality         Status                     ---------                               -----------         ------                     CBC with Differential[[id number]]       Abnormal            Final result                 Please view results for these tests on the individual orders.   TROPONIN ISTAT   TROPONIN ISTAT   POCT TROPONIN   POCT TROPONIN     EKG Readings: (Independently Interpreted)   Initial Reading: No STEMI. Rhythm: Normal Sinus Rhythm. Heart Rate: 65. Ectopy: No Ectopy. ST Segments: Normal ST Segments. T Waves: Normal. Clinical Impression: Normal Sinus Rhythm       Imaging Results              X-Ray Chest 1 View (Final result)  Result time 04/09/24 15:37:13      Final result by Stevie Evans MD (04/09/24 15:37:13)                   Impression:      Confluent opacities in the left infrahilar region and left base might be chronic in nature similar changes had been seen on previous exams dating back to 2022.    No new focal consolidative changes      Electronically signed by: Stevie Evans  Date:    04/09/2024  Time:    15:37               Narrative:    EXAMINATION:  XR CHEST 1 VIEW    CPT 66924    CLINICAL HISTORY:  Dizziness and giddiness    COMPARISON:  March 8, 2024    FINDINGS:  Examination reveals cardiomediastinal silhouette to be unchanged as compared with the previous exam.    Persistent confluent airspace opacities identified in the left lower lobe which might be chronic in nature some of them have been seen on  previous exams dating back to 2022    No other focal consolidative changes                                       Medications   sodium chloride 0.9% bolus 1,000 mL 1,000 mL (0 mLs Intravenous Stopped 4/9/24 1544)     Medical Decision Making  78-year-old male presents with low blood pressure dizziness lightheadedness says he was in the hospital couple weeks ago in his medicines were adjusted they increased his blood pressure medicines he feels now it is too high his blood pressure is running low he feels weak and lightheaded worse with changes in position his vital signs are stable now EKGs shows no acute changes we will give him some fluids do a cardiac workup consider adjusting his medications if everything looks good until he can follow up with the PCP we will discussed all results with the patient when they come back and make a plan in          Amount and/or Complexity of Data Reviewed  Labs: ordered. Decision-making details documented in ED Course.  Radiology: ordered and independent interpretation performed.  ECG/medicine tests: ordered and independent interpretation performed.    Risk  Risk Details: Differential diagnosis dehydration hypotension secondary to blood pressure meds MI STEMI NSTEMI               ED Course as of 04/09/24 1725   Tue Apr 09, 2024   1521 POC Cardiac Troponin I: 0.01 [BL]   1521 WBC: 8.00 [BL]   1521 Hemoglobin: 15.8 [BL]   1521 Hematocrit: 49.3 [BL]   1535 Magnesium : 2.10 [BL]   1535 D-Dimer: 0.38 [BL]   1536 BUN(!): 34.8 [BL]   1536 Creatinine(!): 1.78 [BL]   1558 TSH: 2.442 [BL]   1558 Magnesium : 2.10 [BL]   1558 D-Dimer: 0.38 [BL]   1558 BUN(!): 34.8 [BL]   1558 Creatinine(!): 1.78 [BL]   1558 POC Cardiac Troponin I: 0.01 [BL]      ED Course User Index  [BL] Dale Benítez MD                           Clinical Impression:  Final diagnoses:  [I95.9] Hypotension  [R42] Dizziness  [I95.2] Hypotension due to drugs (Primary)          ED Disposition Condition    Discharge Stable           ED Prescriptions    None       Follow-up Information       Follow up With Specialties Details Why Contact Info    Chanda Lloyd, GUILLERMO Internal Medicine In 1 day  [address] [facility] [address]  [phone]               Dale Benítez MD  04/09/24 8867

## 2024-04-09 NOTE — DISCHARGE INSTRUCTIONS
I would recommend holding the valsartan talk to your primary care doctor tomorrow about medication adjustments and follow up

## 2024-04-10 ENCOUNTER — PATIENT OUTREACH (OUTPATIENT)
Dept: EMERGENCY MEDICINE | Facility: HOSPITAL | Age: 79
End: 2024-04-10
Payer: MEDICARE

## 2024-04-10 LAB
OHS QRS DURATION: 92 MS
OHS QTC CALCULATION: 447 MS

## 2024-11-21 ENCOUNTER — LAB VISIT (OUTPATIENT)
Dept: LAB | Facility: HOSPITAL | Age: 79
End: 2024-11-21
Attending: PHYSICIAN ASSISTANT
Payer: MEDICARE

## 2024-11-21 DIAGNOSIS — Z80.0 FAMILY HISTORY OF MALIGNANT NEOPLASM OF GASTROINTESTINAL TRACT: ICD-10-CM

## 2024-11-21 DIAGNOSIS — K21.9 GASTROESOPHAGEAL REFLUX DISEASE, UNSPECIFIED WHETHER ESOPHAGITIS PRESENT: ICD-10-CM

## 2024-11-21 DIAGNOSIS — R10.13 ABDOMINAL PAIN, EPIGASTRIC: Primary | ICD-10-CM

## 2024-11-21 DIAGNOSIS — R53.1 ASTHENIA: ICD-10-CM

## 2024-11-21 DIAGNOSIS — R14.0 GASTRIC TYMPANY: ICD-10-CM

## 2024-11-21 LAB
ALBUMIN SERPL-MCNC: 3.8 G/DL (ref 3.4–4.8)
ALBUMIN/GLOB SERPL: 1.3 RATIO (ref 1.1–2)
ALP SERPL-CCNC: 74 UNIT/L (ref 40–150)
ALT SERPL-CCNC: 22 UNIT/L (ref 0–55)
ANION GAP SERPL CALC-SCNC: 7 MEQ/L
AST SERPL-CCNC: 36 UNIT/L (ref 5–34)
BASOPHILS # BLD AUTO: 0.03 X10(3)/MCL
BASOPHILS NFR BLD AUTO: 0.4 %
BILIRUB SERPL-MCNC: 0.9 MG/DL
BUN SERPL-MCNC: 23.3 MG/DL (ref 8.4–25.7)
CALCIUM SERPL-MCNC: 9.2 MG/DL (ref 8.8–10)
CHLORIDE SERPL-SCNC: 112 MMOL/L (ref 98–107)
CO2 SERPL-SCNC: 27 MMOL/L (ref 23–31)
CREAT SERPL-MCNC: 1.07 MG/DL (ref 0.72–1.25)
CREAT/UREA NIT SERPL: 22
EOSINOPHIL # BLD AUTO: 0.1 X10(3)/MCL (ref 0–0.9)
EOSINOPHIL NFR BLD AUTO: 1.4 %
ERYTHROCYTE [DISTWIDTH] IN BLOOD BY AUTOMATED COUNT: 14.1 % (ref 11.5–17)
GFR SERPLBLD CREATININE-BSD FMLA CKD-EPI: >60 ML/MIN/1.73/M2
GLOBULIN SER-MCNC: 3 GM/DL (ref 2.4–3.5)
GLUCOSE SERPL-MCNC: 99 MG/DL (ref 82–115)
HCT VFR BLD AUTO: 55 % (ref 42–52)
HGB BLD-MCNC: 17.5 G/DL (ref 14–18)
IMM GRANULOCYTES # BLD AUTO: 0.01 X10(3)/MCL (ref 0–0.04)
IMM GRANULOCYTES NFR BLD AUTO: 0.1 %
LYMPHOCYTES # BLD AUTO: 2.02 X10(3)/MCL (ref 0.6–4.6)
LYMPHOCYTES NFR BLD AUTO: 27.4 %
MCH RBC QN AUTO: 27.6 PG (ref 27–31)
MCHC RBC AUTO-ENTMCNC: 31.8 G/DL (ref 33–36)
MCV RBC AUTO: 86.8 FL (ref 80–94)
MONOCYTES # BLD AUTO: 0.61 X10(3)/MCL (ref 0.1–1.3)
MONOCYTES NFR BLD AUTO: 8.3 %
NEUTROPHILS # BLD AUTO: 4.6 X10(3)/MCL (ref 2.1–9.2)
NEUTROPHILS NFR BLD AUTO: 62.4 %
PLATELET # BLD AUTO: 148 X10(3)/MCL (ref 130–400)
PMV BLD AUTO: 10 FL (ref 7.4–10.4)
POTASSIUM SERPL-SCNC: 4 MMOL/L (ref 3.5–5.1)
PROT SERPL-MCNC: 6.8 GM/DL (ref 5.8–7.6)
RBC # BLD AUTO: 6.34 X10(6)/MCL (ref 4.7–6.1)
SODIUM SERPL-SCNC: 146 MMOL/L (ref 136–145)
T3FREE SERPL-MCNC: 2.84 PG/ML (ref 1.58–3.91)
T4 SERPL-MCNC: 9.07 UG/DL (ref 4.87–11.72)
TSH SERPL-ACNC: 2.09 UIU/ML (ref 0.35–4.94)
WBC # BLD AUTO: 7.37 X10(3)/MCL (ref 4.5–11.5)

## 2024-11-21 PROCEDURE — 84443 ASSAY THYROID STIM HORMONE: CPT

## 2024-11-21 PROCEDURE — 84481 FREE ASSAY (FT-3): CPT

## 2024-11-21 PROCEDURE — 36415 COLL VENOUS BLD VENIPUNCTURE: CPT

## 2024-11-21 PROCEDURE — 84436 ASSAY OF TOTAL THYROXINE: CPT

## 2024-11-21 PROCEDURE — 85025 COMPLETE CBC W/AUTO DIFF WBC: CPT

## 2024-11-21 PROCEDURE — 80053 COMPREHEN METABOLIC PANEL: CPT

## 2024-12-16 ENCOUNTER — LAB VISIT (OUTPATIENT)
Dept: LAB | Facility: HOSPITAL | Age: 79
End: 2024-12-16
Attending: INTERNAL MEDICINE
Payer: MEDICARE

## 2024-12-16 DIAGNOSIS — I43 DILATED CARDIOMYOPATHY SECONDARY TO ELECTROLYTE DEFICIENCY: ICD-10-CM

## 2024-12-16 DIAGNOSIS — D63.1 ANEMIA OF CHRONIC RENAL FAILURE, UNSPECIFIED CKD STAGE: Primary | ICD-10-CM

## 2024-12-16 DIAGNOSIS — E88.09 ARYLSULFATASE DEFICIENCY WITHOUT MLD: ICD-10-CM

## 2024-12-16 DIAGNOSIS — E55.9 AVITAMINOSIS D: ICD-10-CM

## 2024-12-16 DIAGNOSIS — N18.31 CHRONIC KIDNEY DISEASE (CKD) STAGE G3A/A1, MODERATELY DECREASED GLOMERULAR FILTRATION RATE (GFR) BETWEEN 45-59 ML/MIN/1.73 SQUARE METER AND ALBUMINURIA CREATININE RATIO LESS THAN 30 MG/G: ICD-10-CM

## 2024-12-16 DIAGNOSIS — E87.8 DILATED CARDIOMYOPATHY SECONDARY TO ELECTROLYTE DEFICIENCY: ICD-10-CM

## 2024-12-16 DIAGNOSIS — N18.9 ANEMIA OF CHRONIC RENAL FAILURE, UNSPECIFIED CKD STAGE: Primary | ICD-10-CM

## 2024-12-16 DIAGNOSIS — E21.3 HYPERPARATHYROIDISM, UNSPECIFIED: ICD-10-CM

## 2024-12-16 DIAGNOSIS — R80.9 PROTEINURIA, UNSPECIFIED TYPE: ICD-10-CM

## 2024-12-16 LAB
25(OH)D3+25(OH)D2 SERPL-MCNC: 48 NG/ML (ref 30–80)
ALBUMIN SERPL-MCNC: 3.5 G/DL (ref 3.4–4.8)
ALBUMIN/GLOB SERPL: 0.9 RATIO (ref 1.1–2)
ALP SERPL-CCNC: 89 UNIT/L (ref 40–150)
ALT SERPL-CCNC: 25 UNIT/L (ref 0–55)
ANION GAP SERPL CALC-SCNC: 7 MEQ/L
AST SERPL-CCNC: 35 UNIT/L (ref 5–34)
BACTERIA #/AREA URNS AUTO: ABNORMAL /HPF
BILIRUB SERPL-MCNC: 0.6 MG/DL
BILIRUB UR QL STRIP.AUTO: ABNORMAL
BUN SERPL-MCNC: 33.9 MG/DL (ref 8.4–25.7)
CALCIUM SERPL-MCNC: 9.1 MG/DL (ref 8.8–10)
CHLORIDE SERPL-SCNC: 108 MMOL/L (ref 98–107)
CLARITY UR: CLEAR
CO2 SERPL-SCNC: 26 MMOL/L (ref 23–31)
COLOR UR AUTO: YELLOW
CREAT SERPL-MCNC: 1.25 MG/DL (ref 0.72–1.25)
CREAT UR-MCNC: 297.6 MG/DL (ref 63–166)
CREAT/UREA NIT SERPL: 27
ERYTHROCYTE [DISTWIDTH] IN BLOOD BY AUTOMATED COUNT: 14.1 % (ref 11.5–17)
GFR SERPLBLD CREATININE-BSD FMLA CKD-EPI: 58 ML/MIN/1.73/M2
GLOBULIN SER-MCNC: 3.7 GM/DL (ref 2.4–3.5)
GLUCOSE SERPL-MCNC: 107 MG/DL (ref 82–115)
GLUCOSE UR QL STRIP: NEGATIVE
HCT VFR BLD AUTO: 52.9 % (ref 42–52)
HGB BLD-MCNC: 17.3 G/DL (ref 14–18)
HGB UR QL STRIP: NEGATIVE
KETONES UR QL STRIP: ABNORMAL
LEUKOCYTE ESTERASE UR QL STRIP: NEGATIVE
MAGNESIUM SERPL-MCNC: 1.9 MG/DL (ref 1.6–2.6)
MCH RBC QN AUTO: 27.9 PG (ref 27–31)
MCHC RBC AUTO-ENTMCNC: 32.7 G/DL (ref 33–36)
MCV RBC AUTO: 85.3 FL (ref 80–94)
NITRITE UR QL STRIP: NEGATIVE
PH UR STRIP: 5.5 [PH]
PHOSPHATE SERPL-MCNC: 2.7 MG/DL (ref 2.3–4.7)
PLATELET # BLD AUTO: 147 X10(3)/MCL (ref 130–400)
PMV BLD AUTO: 9.5 FL (ref 7.4–10.4)
POTASSIUM SERPL-SCNC: 3.9 MMOL/L (ref 3.5–5.1)
PROT SERPL-MCNC: 7.2 GM/DL (ref 5.8–7.6)
PROT UR QL STRIP: 30
PROT UR STRIP-MCNC: 41.9 MG/DL
PTH-INTACT SERPL-MCNC: 54.3 PG/ML (ref 8.7–77)
RBC # BLD AUTO: 6.2 X10(6)/MCL (ref 4.7–6.1)
RBC #/AREA URNS AUTO: ABNORMAL /HPF
SODIUM SERPL-SCNC: 141 MMOL/L (ref 136–145)
SP GR UR STRIP.AUTO: >=1.03 (ref 1–1.03)
SQUAMOUS #/AREA URNS AUTO: ABNORMAL /HPF
TSH SERPL-ACNC: 3.63 UIU/ML (ref 0.35–4.94)
URINE PROTEIN/CREATININE RATIO (OLG): 0.1
UROBILINOGEN UR STRIP-ACNC: 1
WBC # BLD AUTO: 7.31 X10(3)/MCL (ref 4.5–11.5)
WBC #/AREA URNS AUTO: ABNORMAL /HPF

## 2024-12-16 PROCEDURE — 83970 ASSAY OF PARATHORMONE: CPT

## 2024-12-16 PROCEDURE — 36415 COLL VENOUS BLD VENIPUNCTURE: CPT

## 2024-12-16 PROCEDURE — 81003 URINALYSIS AUTO W/O SCOPE: CPT

## 2024-12-16 PROCEDURE — 84156 ASSAY OF PROTEIN URINE: CPT

## 2024-12-16 PROCEDURE — 83735 ASSAY OF MAGNESIUM: CPT

## 2024-12-16 PROCEDURE — 84443 ASSAY THYROID STIM HORMONE: CPT

## 2024-12-16 PROCEDURE — 82306 VITAMIN D 25 HYDROXY: CPT

## 2024-12-16 PROCEDURE — 84100 ASSAY OF PHOSPHORUS: CPT

## 2024-12-16 PROCEDURE — 80053 COMPREHEN METABOLIC PANEL: CPT

## 2024-12-16 PROCEDURE — 85027 COMPLETE CBC AUTOMATED: CPT

## 2025-01-13 ENCOUNTER — LAB VISIT (OUTPATIENT)
Dept: LAB | Facility: HOSPITAL | Age: 80
End: 2025-01-13
Attending: INTERNAL MEDICINE
Payer: MEDICARE

## 2025-01-13 DIAGNOSIS — I10 ESSENTIAL HYPERTENSION, MALIGNANT: Primary | ICD-10-CM

## 2025-01-13 DIAGNOSIS — E78.5 HYPERLIPIDEMIA, UNSPECIFIED HYPERLIPIDEMIA TYPE: ICD-10-CM

## 2025-01-13 LAB
ALBUMIN SERPL-MCNC: 3.5 G/DL (ref 3.4–4.8)
ALBUMIN/GLOB SERPL: 1.1 RATIO (ref 1.1–2)
ALP SERPL-CCNC: 86 UNIT/L (ref 40–150)
ALT SERPL-CCNC: 20 UNIT/L (ref 0–55)
ANION GAP SERPL CALC-SCNC: 6 MEQ/L
AST SERPL-CCNC: 28 UNIT/L (ref 5–34)
BILIRUB SERPL-MCNC: 0.7 MG/DL
BNP BLD-MCNC: 17.5 PG/ML
BUN SERPL-MCNC: 25 MG/DL (ref 8.4–25.7)
CALCIUM SERPL-MCNC: 9 MG/DL (ref 8.8–10)
CHLORIDE SERPL-SCNC: 110 MMOL/L (ref 98–107)
CO2 SERPL-SCNC: 26 MMOL/L (ref 23–31)
CREAT SERPL-MCNC: 1.1 MG/DL (ref 0.72–1.25)
CREAT/UREA NIT SERPL: 23
ERYTHROCYTE [DISTWIDTH] IN BLOOD BY AUTOMATED COUNT: 14.5 % (ref 11.5–17)
GFR SERPLBLD CREATININE-BSD FMLA CKD-EPI: >60 ML/MIN/1.73/M2
GLOBULIN SER-MCNC: 3.1 GM/DL (ref 2.4–3.5)
GLUCOSE SERPL-MCNC: 133 MG/DL (ref 82–115)
HCT VFR BLD AUTO: 52.7 % (ref 42–52)
HGB BLD-MCNC: 17.3 G/DL (ref 14–18)
MCH RBC QN AUTO: 27.8 PG (ref 27–31)
MCHC RBC AUTO-ENTMCNC: 32.8 G/DL (ref 33–36)
MCV RBC AUTO: 84.7 FL (ref 80–94)
PLATELET # BLD AUTO: 150 X10(3)/MCL (ref 130–400)
PMV BLD AUTO: 9.4 FL (ref 7.4–10.4)
POTASSIUM SERPL-SCNC: 3.7 MMOL/L (ref 3.5–5.1)
PROT SERPL-MCNC: 6.6 GM/DL (ref 5.8–7.6)
RBC # BLD AUTO: 6.22 X10(6)/MCL (ref 4.7–6.1)
SODIUM SERPL-SCNC: 142 MMOL/L (ref 136–145)
WBC # BLD AUTO: 6.03 X10(3)/MCL (ref 4.5–11.5)

## 2025-01-13 PROCEDURE — 80053 COMPREHEN METABOLIC PANEL: CPT

## 2025-01-13 PROCEDURE — 36415 COLL VENOUS BLD VENIPUNCTURE: CPT

## 2025-01-13 PROCEDURE — 85027 COMPLETE CBC AUTOMATED: CPT

## 2025-01-13 PROCEDURE — 83880 ASSAY OF NATRIURETIC PEPTIDE: CPT

## 2025-01-30 ENCOUNTER — HOSPITAL ENCOUNTER (OUTPATIENT)
Dept: RADIOLOGY | Facility: HOSPITAL | Age: 80
Discharge: HOME | End: 2025-01-30
Attending: NURSE PRACTITIONER
Payer: MEDICARE

## 2025-01-30 DIAGNOSIS — R06.2 EXPIRATORY WHEEZING: ICD-10-CM

## 2025-01-30 DIAGNOSIS — R06.2 EXPIRATORY WHEEZING: Primary | ICD-10-CM

## 2025-01-30 PROCEDURE — 71046 X-RAY EXAM CHEST 2 VIEWS: CPT | Mod: TC

## 2025-02-17 ENCOUNTER — HOSPITAL ENCOUNTER (OUTPATIENT)
Dept: RADIOLOGY | Facility: HOSPITAL | Age: 80
Discharge: HOME | End: 2025-02-17
Attending: NURSE PRACTITIONER
Payer: MEDICARE

## 2025-02-17 DIAGNOSIS — J18.9 UNRESOLVED PNEUMONIA: ICD-10-CM

## 2025-02-17 DIAGNOSIS — J18.9 UNRESOLVED PNEUMONIA: Primary | ICD-10-CM

## 2025-02-17 PROCEDURE — 71046 X-RAY EXAM CHEST 2 VIEWS: CPT | Mod: TC

## 2025-03-10 ENCOUNTER — HOSPITAL ENCOUNTER (INPATIENT)
Facility: HOSPITAL | Age: 80
LOS: 2 days | Discharge: HOME | DRG: 198 | End: 2025-03-13
Attending: FAMILY MEDICINE | Admitting: INTERNAL MEDICINE
Payer: MEDICARE

## 2025-03-10 DIAGNOSIS — R06.02 SOB (SHORTNESS OF BREATH) ON EXERTION: ICD-10-CM

## 2025-03-10 DIAGNOSIS — R06.02 SOB (SHORTNESS OF BREATH): ICD-10-CM

## 2025-03-10 DIAGNOSIS — I25.810 CAD (CORONARY ARTERY DISEASE) OF ARTERY BYPASS GRAFT: ICD-10-CM

## 2025-03-10 DIAGNOSIS — R07.9 CHEST PAIN: ICD-10-CM

## 2025-03-10 LAB
ALBUMIN SERPL-MCNC: 3.8 G/DL (ref 3.4–4.8)
ALBUMIN/GLOB SERPL: 1 RATIO (ref 1.1–2)
ALP SERPL-CCNC: 78 UNIT/L (ref 40–150)
ALT SERPL-CCNC: 26 UNIT/L (ref 0–55)
ANION GAP SERPL CALC-SCNC: 8 MEQ/L
AST SERPL-CCNC: 36 UNIT/L (ref 5–34)
BACTERIA #/AREA URNS AUTO: NORMAL /HPF
BASOPHILS # BLD AUTO: 0.05 X10(3)/MCL
BASOPHILS NFR BLD AUTO: 0.7 %
BILIRUB SERPL-MCNC: 1.1 MG/DL
BILIRUB UR QL STRIP.AUTO: NEGATIVE
BNP BLD-MCNC: 12.3 PG/ML
BNP BLD-MCNC: <10 PG/ML
BSA FOR ECHO PROCEDURE: 2.24 M2
BUN SERPL-MCNC: 25.1 MG/DL (ref 8.4–25.7)
CALCIUM SERPL-MCNC: 9.6 MG/DL (ref 8.8–10)
CHLORIDE SERPL-SCNC: 109 MMOL/L (ref 98–107)
CLARITY UR: CLEAR
CO2 SERPL-SCNC: 26 MMOL/L (ref 23–31)
COLOR UR AUTO: YELLOW
CREAT SERPL-MCNC: 1.24 MG/DL (ref 0.72–1.25)
CREAT/UREA NIT SERPL: 20
CV ECHO LV RWT: 0.61 CM
D DIMER PPP IA.FEU-MCNC: 0.72 UG/ML FEU (ref 0–0.5)
ECHO LV POSTERIOR WALL: 1.4 CM (ref 0.6–1.1)
EJECTION FRACTION: 60 %
EOSINOPHIL # BLD AUTO: 0.14 X10(3)/MCL (ref 0–0.9)
EOSINOPHIL NFR BLD AUTO: 2 %
ERYTHROCYTE [DISTWIDTH] IN BLOOD BY AUTOMATED COUNT: 15 % (ref 11.5–17)
FIO2: 28
FLUAV AG UPPER RESP QL IA.RAPID: NOT DETECTED
FLUBV AG UPPER RESP QL IA.RAPID: NOT DETECTED
FRACTIONAL SHORTENING: 34.8 % (ref 28–44)
GFR SERPLBLD CREATININE-BSD FMLA CKD-EPI: 59 ML/MIN/1.73/M2
GLOBULIN SER-MCNC: 3.8 GM/DL (ref 2.4–3.5)
GLUCOSE SERPL-MCNC: 110 MG/DL (ref 82–115)
GLUCOSE UR QL STRIP: NEGATIVE
HCO3 UR-SCNC: 24.8 MMOL/L (ref 24–28)
HCT VFR BLD AUTO: 54.7 % (ref 42–52)
HGB BLD-MCNC: 17.6 G/DL (ref 14–18)
HGB UR QL STRIP: NEGATIVE
IMM GRANULOCYTES # BLD AUTO: 0.01 X10(3)/MCL (ref 0–0.04)
IMM GRANULOCYTES NFR BLD AUTO: 0.1 %
INR PPP: 1.3
INTERVENTRICULAR SEPTUM: 1.4 CM (ref 0.6–1.1)
KETONES UR QL STRIP: NEGATIVE
LEFT ATRIUM SIZE: 4.5 CM
LEFT INTERNAL DIMENSION IN SYSTOLE: 3 CM (ref 2.1–4)
LEFT VENTRICLE MASS INDEX: 118.3 G/M2
LEFT VENTRICULAR INTERNAL DIMENSION IN DIASTOLE: 4.6 CM (ref 3.5–6)
LEFT VENTRICULAR MASS: 256.8 G
LEUKOCYTE ESTERASE UR QL STRIP: NEGATIVE
LYMPHOCYTES # BLD AUTO: 1.73 X10(3)/MCL (ref 0.6–4.6)
LYMPHOCYTES NFR BLD AUTO: 24.7 %
MAGNESIUM SERPL-MCNC: 1.8 MG/DL (ref 1.6–2.6)
MCH RBC QN AUTO: 28.1 PG (ref 27–31)
MCHC RBC AUTO-ENTMCNC: 32.2 G/DL (ref 33–36)
MCV RBC AUTO: 87.4 FL (ref 80–94)
MONOCYTES # BLD AUTO: 0.59 X10(3)/MCL (ref 0.1–1.3)
MONOCYTES NFR BLD AUTO: 8.4 %
NEUTROPHILS # BLD AUTO: 4.47 X10(3)/MCL (ref 2.1–9.2)
NEUTROPHILS NFR BLD AUTO: 64.1 %
NITRITE UR QL STRIP: NEGATIVE
PCO2 BLDA: 45.7 MMHG (ref 35–45)
PH SMN: 7.34 [PH] (ref 7.35–7.45)
PH UR STRIP: 5.5 [PH]
PLATELET # BLD AUTO: 161 X10(3)/MCL (ref 130–400)
PMV BLD AUTO: 9.4 FL (ref 7.4–10.4)
PO2 BLDA: 115 MMHG (ref 80–100)
POC BE: -1 MMOL/L
POC SATURATED O2: 98 % (ref 95–100)
POC TCO2: 26 MMOL/L (ref 23–27)
POTASSIUM SERPL-SCNC: 4.6 MMOL/L (ref 3.5–5.1)
PROT SERPL-MCNC: 7.6 GM/DL (ref 5.8–7.6)
PROT UR QL STRIP: ABNORMAL
PROTHROMBIN TIME: 12.7 SECONDS (ref 12.5–14.5)
RBC # BLD AUTO: 6.26 X10(6)/MCL (ref 4.7–6.1)
RBC #/AREA URNS AUTO: NORMAL /HPF
RSV A 5' UTR RNA NPH QL NAA+PROBE: NOT DETECTED
SAMPLE: ABNORMAL
SARS-COV-2 RNA RESP QL NAA+PROBE: NOT DETECTED
SODIUM SERPL-SCNC: 143 MMOL/L (ref 136–145)
SP GR UR STRIP.AUTO: 1.02 (ref 1–1.03)
SQUAMOUS #/AREA URNS AUTO: NORMAL /HPF
TROPONIN I SERPL-MCNC: <0.01 NG/ML (ref 0–0.04)
UROBILINOGEN UR STRIP-ACNC: 0.2
WBC # BLD AUTO: 6.99 X10(3)/MCL (ref 4.5–11.5)
WBC #/AREA URNS AUTO: NORMAL /HPF
Z-SCORE OF LEFT VENTRICULAR DIMENSION IN END DIASTOLE: -4.45
Z-SCORE OF LEFT VENTRICULAR DIMENSION IN END SYSTOLE: -2.97

## 2025-03-10 PROCEDURE — 99900031 HC PATIENT EDUCATION (STAT)

## 2025-03-10 PROCEDURE — 99900035 HC TECH TIME PER 15 MIN (STAT)

## 2025-03-10 PROCEDURE — 94640 AIRWAY INHALATION TREATMENT: CPT

## 2025-03-10 PROCEDURE — 81003 URINALYSIS AUTO W/O SCOPE: CPT | Performed by: INTERNAL MEDICINE

## 2025-03-10 PROCEDURE — 27000221 HC OXYGEN, UP TO 24 HOURS

## 2025-03-10 PROCEDURE — 94640 AIRWAY INHALATION TREATMENT: CPT | Mod: XB

## 2025-03-10 PROCEDURE — 83880 ASSAY OF NATRIURETIC PEPTIDE: CPT | Mod: 91 | Performed by: INTERNAL MEDICINE

## 2025-03-10 PROCEDURE — 94644 CONT INHLJ TX 1ST HOUR: CPT

## 2025-03-10 PROCEDURE — 85025 COMPLETE CBC W/AUTO DIFF WBC: CPT | Performed by: FAMILY MEDICINE

## 2025-03-10 PROCEDURE — 63600175 PHARM REV CODE 636 W HCPCS: Performed by: INTERNAL MEDICINE

## 2025-03-10 PROCEDURE — 99285 EMERGENCY DEPT VISIT HI MDM: CPT | Mod: 25

## 2025-03-10 PROCEDURE — 63600175 PHARM REV CODE 636 W HCPCS: Performed by: FAMILY MEDICINE

## 2025-03-10 PROCEDURE — 85610 PROTHROMBIN TIME: CPT | Performed by: FAMILY MEDICINE

## 2025-03-10 PROCEDURE — 25000242 PHARM REV CODE 250 ALT 637 W/ HCPCS: Performed by: INTERNAL MEDICINE

## 2025-03-10 PROCEDURE — 93005 ELECTROCARDIOGRAM TRACING: CPT

## 2025-03-10 PROCEDURE — 80053 COMPREHEN METABOLIC PANEL: CPT | Performed by: FAMILY MEDICINE

## 2025-03-10 PROCEDURE — 94799 UNLISTED PULMONARY SVC/PX: CPT

## 2025-03-10 PROCEDURE — 25000242 PHARM REV CODE 250 ALT 637 W/ HCPCS: Performed by: FAMILY MEDICINE

## 2025-03-10 PROCEDURE — 83735 ASSAY OF MAGNESIUM: CPT | Performed by: FAMILY MEDICINE

## 2025-03-10 PROCEDURE — 25500020 PHARM REV CODE 255: Performed by: FAMILY MEDICINE

## 2025-03-10 PROCEDURE — 36600 WITHDRAWAL OF ARTERIAL BLOOD: CPT

## 2025-03-10 PROCEDURE — 83880 ASSAY OF NATRIURETIC PEPTIDE: CPT | Performed by: FAMILY MEDICINE

## 2025-03-10 PROCEDURE — 82803 BLOOD GASES ANY COMBINATION: CPT

## 2025-03-10 PROCEDURE — G0378 HOSPITAL OBSERVATION PER HR: HCPCS

## 2025-03-10 PROCEDURE — 94760 N-INVAS EAR/PLS OXIMETRY 1: CPT | Mod: XB

## 2025-03-10 PROCEDURE — 85379 FIBRIN DEGRADATION QUANT: CPT | Performed by: FAMILY MEDICINE

## 2025-03-10 PROCEDURE — 36415 COLL VENOUS BLD VENIPUNCTURE: CPT | Performed by: INTERNAL MEDICINE

## 2025-03-10 PROCEDURE — 25000003 PHARM REV CODE 250: Performed by: FAMILY MEDICINE

## 2025-03-10 PROCEDURE — 84484 ASSAY OF TROPONIN QUANT: CPT | Performed by: FAMILY MEDICINE

## 2025-03-10 PROCEDURE — 0241U COVID/RSV/FLU A&B PCR: CPT | Performed by: FAMILY MEDICINE

## 2025-03-10 PROCEDURE — 93010 ELECTROCARDIOGRAM REPORT: CPT | Mod: ,,, | Performed by: INTERNAL MEDICINE

## 2025-03-10 PROCEDURE — 87040 BLOOD CULTURE FOR BACTERIA: CPT | Mod: 91 | Performed by: FAMILY MEDICINE

## 2025-03-10 PROCEDURE — 25000003 PHARM REV CODE 250: Performed by: INTERNAL MEDICINE

## 2025-03-10 RX ORDER — SODIUM CHLORIDE 0.9 % (FLUSH) 0.9 %
10 SYRINGE (ML) INJECTION
Status: DISCONTINUED | OUTPATIENT
Start: 2025-03-10 | End: 2025-03-13 | Stop reason: HOSPADM

## 2025-03-10 RX ORDER — DEXLANSOPRAZOLE 60 MG/1
1 CAPSULE, DELAYED RELEASE ORAL DAILY
COMMUNITY

## 2025-03-10 RX ORDER — IBUPROFEN 200 MG
16 TABLET ORAL
Status: DISCONTINUED | OUTPATIENT
Start: 2025-03-10 | End: 2025-03-13 | Stop reason: HOSPADM

## 2025-03-10 RX ORDER — CIPROFLOXACIN 2 MG/ML
400 INJECTION, SOLUTION INTRAVENOUS
Status: DISCONTINUED | OUTPATIENT
Start: 2025-03-10 | End: 2025-03-13 | Stop reason: HOSPADM

## 2025-03-10 RX ORDER — ONDANSETRON HYDROCHLORIDE 2 MG/ML
4 INJECTION, SOLUTION INTRAVENOUS EVERY 8 HOURS PRN
Status: DISCONTINUED | OUTPATIENT
Start: 2025-03-10 | End: 2025-03-13 | Stop reason: HOSPADM

## 2025-03-10 RX ORDER — IBUPROFEN 200 MG
24 TABLET ORAL
Status: DISCONTINUED | OUTPATIENT
Start: 2025-03-10 | End: 2025-03-13 | Stop reason: HOSPADM

## 2025-03-10 RX ORDER — MORPHINE SULFATE 4 MG/ML
2 INJECTION, SOLUTION INTRAMUSCULAR; INTRAVENOUS EVERY 6 HOURS PRN
Refills: 0 | Status: DISCONTINUED | OUTPATIENT
Start: 2025-03-10 | End: 2025-03-10

## 2025-03-10 RX ORDER — DIAZEPAM 5 MG/1
10 TABLET ORAL
Status: DISCONTINUED | OUTPATIENT
Start: 2025-03-10 | End: 2025-03-13 | Stop reason: HOSPADM

## 2025-03-10 RX ORDER — CHOLECALCIFEROL (VITAMIN D3) 25 MCG
5000 TABLET ORAL NIGHTLY
Status: DISCONTINUED | OUTPATIENT
Start: 2025-03-10 | End: 2025-03-13 | Stop reason: HOSPADM

## 2025-03-10 RX ORDER — NALOXONE HCL 0.4 MG/ML
0.02 VIAL (ML) INJECTION
Status: DISCONTINUED | OUTPATIENT
Start: 2025-03-10 | End: 2025-03-13 | Stop reason: HOSPADM

## 2025-03-10 RX ORDER — TALC
9 POWDER (GRAM) TOPICAL NIGHTLY PRN
Status: DISCONTINUED | OUTPATIENT
Start: 2025-03-10 | End: 2025-03-13 | Stop reason: HOSPADM

## 2025-03-10 RX ORDER — PANTOPRAZOLE SODIUM 40 MG/1
40 TABLET, DELAYED RELEASE ORAL DAILY
Status: DISCONTINUED | OUTPATIENT
Start: 2025-03-11 | End: 2025-03-13 | Stop reason: HOSPADM

## 2025-03-10 RX ORDER — VORTIOXETINE 10 MG/1
1 TABLET, FILM COATED ORAL DAILY
COMMUNITY

## 2025-03-10 RX ORDER — IPRATROPIUM BROMIDE AND ALBUTEROL SULFATE 2.5; .5 MG/3ML; MG/3ML
3 SOLUTION RESPIRATORY (INHALATION) EVERY 6 HOURS PRN
Status: DISCONTINUED | OUTPATIENT
Start: 2025-03-10 | End: 2025-03-13 | Stop reason: HOSPADM

## 2025-03-10 RX ORDER — BUDESONIDE 0.5 MG/2ML
0.5 INHALANT ORAL EVERY 12 HOURS
Status: DISCONTINUED | OUTPATIENT
Start: 2025-03-10 | End: 2025-03-13 | Stop reason: HOSPADM

## 2025-03-10 RX ORDER — GLUCAGON 1 MG
1 KIT INJECTION
Status: DISCONTINUED | OUTPATIENT
Start: 2025-03-10 | End: 2025-03-13 | Stop reason: HOSPADM

## 2025-03-10 RX ORDER — LANOLIN ALCOHOL/MO/W.PET/CERES
1000 CREAM (GRAM) TOPICAL NIGHTLY
Status: DISCONTINUED | OUTPATIENT
Start: 2025-03-10 | End: 2025-03-13 | Stop reason: HOSPADM

## 2025-03-10 RX ORDER — ONDANSETRON 4 MG/1
8 TABLET, ORALLY DISINTEGRATING ORAL EVERY 8 HOURS PRN
Status: DISCONTINUED | OUTPATIENT
Start: 2025-03-10 | End: 2025-03-13 | Stop reason: HOSPADM

## 2025-03-10 RX ORDER — AMLODIPINE BESYLATE 5 MG/1
5 TABLET ORAL NIGHTLY
Status: DISCONTINUED | OUTPATIENT
Start: 2025-03-10 | End: 2025-03-13 | Stop reason: HOSPADM

## 2025-03-10 RX ORDER — ASPIRIN 81 MG/1
81 TABLET ORAL DAILY
Status: DISCONTINUED | OUTPATIENT
Start: 2025-03-11 | End: 2025-03-13 | Stop reason: HOSPADM

## 2025-03-10 RX ORDER — FENOFIBRATE 145 MG/1
145 TABLET, FILM COATED ORAL DAILY
Status: DISCONTINUED | OUTPATIENT
Start: 2025-03-11 | End: 2025-03-13 | Stop reason: HOSPADM

## 2025-03-10 RX ORDER — CEFTRIAXONE 1 G/1
1 INJECTION, POWDER, FOR SOLUTION INTRAMUSCULAR; INTRAVENOUS
Status: DISCONTINUED | OUTPATIENT
Start: 2025-03-11 | End: 2025-03-10

## 2025-03-10 RX ORDER — POLYETHYLENE GLYCOL 3350 17 G/17G
17 POWDER, FOR SOLUTION ORAL 3 TIMES DAILY PRN
Status: DISCONTINUED | OUTPATIENT
Start: 2025-03-10 | End: 2025-03-13 | Stop reason: HOSPADM

## 2025-03-10 RX ORDER — NITROGLYCERIN 0.4 MG/1
0.4 TABLET SUBLINGUAL EVERY 5 MIN PRN
COMMUNITY
Start: 2023-10-10

## 2025-03-10 RX ORDER — SIMETHICONE 80 MG
1 TABLET,CHEWABLE ORAL 4 TIMES DAILY PRN
Status: DISCONTINUED | OUTPATIENT
Start: 2025-03-10 | End: 2025-03-13 | Stop reason: HOSPADM

## 2025-03-10 RX ORDER — BISACODYL 10 MG/1
10 SUPPOSITORY RECTAL DAILY PRN
Status: DISCONTINUED | OUTPATIENT
Start: 2025-03-10 | End: 2025-03-13 | Stop reason: HOSPADM

## 2025-03-10 RX ORDER — NITROGLYCERIN 0.4 MG/1
0.4 TABLET SUBLINGUAL EVERY 5 MIN PRN
Status: DISCONTINUED | OUTPATIENT
Start: 2025-03-10 | End: 2025-03-13 | Stop reason: HOSPADM

## 2025-03-10 RX ORDER — ALUMINUM HYDROXIDE, MAGNESIUM HYDROXIDE, AND SIMETHICONE 1200; 120; 1200 MG/30ML; MG/30ML; MG/30ML
30 SUSPENSION ORAL 4 TIMES DAILY PRN
Status: DISCONTINUED | OUTPATIENT
Start: 2025-03-10 | End: 2025-03-13 | Stop reason: HOSPADM

## 2025-03-10 RX ORDER — AMLODIPINE BESYLATE 5 MG/1
5 TABLET ORAL NIGHTLY
COMMUNITY

## 2025-03-10 RX ORDER — HYDROCODONE BITARTRATE AND ACETAMINOPHEN 5; 325 MG/1; MG/1
1 TABLET ORAL EVERY 6 HOURS PRN
Refills: 0 | Status: DISCONTINUED | OUTPATIENT
Start: 2025-03-10 | End: 2025-03-13 | Stop reason: HOSPADM

## 2025-03-10 RX ORDER — ENOXAPARIN SODIUM 100 MG/ML
40 INJECTION SUBCUTANEOUS EVERY 24 HOURS
Status: DISCONTINUED | OUTPATIENT
Start: 2025-03-10 | End: 2025-03-12

## 2025-03-10 RX ORDER — IPRATROPIUM BROMIDE AND ALBUTEROL SULFATE 2.5; .5 MG/3ML; MG/3ML
3 SOLUTION RESPIRATORY (INHALATION) EVERY 4 HOURS
Status: DISCONTINUED | OUTPATIENT
Start: 2025-03-10 | End: 2025-03-13 | Stop reason: HOSPADM

## 2025-03-10 RX ORDER — IPRATROPIUM BROMIDE AND ALBUTEROL SULFATE 2.5; .5 MG/3ML; MG/3ML
3 SOLUTION RESPIRATORY (INHALATION)
Status: COMPLETED | OUTPATIENT
Start: 2025-03-10 | End: 2025-03-10

## 2025-03-10 RX ORDER — ATORVASTATIN CALCIUM 40 MG/1
80 TABLET, FILM COATED ORAL NIGHTLY
Status: DISCONTINUED | OUTPATIENT
Start: 2025-03-10 | End: 2025-03-10

## 2025-03-10 RX ORDER — ACETAMINOPHEN 325 MG/1
650 TABLET ORAL EVERY 4 HOURS PRN
Status: DISCONTINUED | OUTPATIENT
Start: 2025-03-10 | End: 2025-03-13 | Stop reason: HOSPADM

## 2025-03-10 RX ORDER — CEFTRIAXONE 1 G/1
1 INJECTION, POWDER, FOR SOLUTION INTRAMUSCULAR; INTRAVENOUS
Status: COMPLETED | OUTPATIENT
Start: 2025-03-10 | End: 2025-03-10

## 2025-03-10 RX ADMIN — Medication 5000 UNITS: at 09:03

## 2025-03-10 RX ADMIN — METHYLPREDNISOLONE SODIUM SUCCINATE 60 MG: 40 INJECTION, POWDER, FOR SOLUTION INTRAMUSCULAR; INTRAVENOUS at 06:03

## 2025-03-10 RX ADMIN — METHYLPREDNISOLONE SODIUM SUCCINATE 60 MG: 40 INJECTION, POWDER, FOR SOLUTION INTRAMUSCULAR; INTRAVENOUS at 11:03

## 2025-03-10 RX ADMIN — CIPROFLOXACIN 400 MG: 2 INJECTION, SOLUTION INTRAVENOUS at 03:03

## 2025-03-10 RX ADMIN — CEFTRIAXONE SODIUM 1 G: 1 INJECTION, POWDER, FOR SOLUTION INTRAMUSCULAR; INTRAVENOUS at 11:03

## 2025-03-10 RX ADMIN — IOHEXOL 100 ML: 350 INJECTION, SOLUTION INTRAVENOUS at 10:03

## 2025-03-10 RX ADMIN — BUDESONIDE 0.5 MG: 0.5 INHALANT RESPIRATORY (INHALATION) at 08:03

## 2025-03-10 RX ADMIN — RIVAROXABAN 2.5 MG: 2.5 TABLET, FILM COATED ORAL at 09:03

## 2025-03-10 RX ADMIN — AMLODIPINE BESYLATE 5 MG: 5 TABLET ORAL at 09:03

## 2025-03-10 RX ADMIN — IPRATROPIUM BROMIDE AND ALBUTEROL SULFATE 3 ML: .5; 3 SOLUTION RESPIRATORY (INHALATION) at 10:03

## 2025-03-10 RX ADMIN — NITROGLYCERIN 0.5 INCH: 20 OINTMENT TOPICAL at 09:03

## 2025-03-10 RX ADMIN — IPRATROPIUM BROMIDE AND ALBUTEROL SULFATE 3 ML: .5; 3 SOLUTION RESPIRATORY (INHALATION) at 03:03

## 2025-03-10 RX ADMIN — CYANOCOBALAMIN TAB 1000 MCG 1000 MCG: 1000 TAB at 09:03

## 2025-03-10 RX ADMIN — IPRATROPIUM BROMIDE AND ALBUTEROL SULFATE 3 ML: .5; 3 SOLUTION RESPIRATORY (INHALATION) at 08:03

## 2025-03-10 NOTE — ED PROVIDER NOTES
Encounter Date: 3/10/2025       History     Chief Complaint   Patient presents with    Shortness of Breath     SOB x 3 days -pain to post mid back    Impression:     Confluent airspace opacities in the left retrocardiac region might represent an infiltrate/atelectasis.     Otherwise no other significant change     79-year-old presents with a cough and shortness of breath with the past 3 days has been treated with 2 rounds of antibiotics some neb from has a home for his PCP does not feel it is getting any better        Review of patient's allergies indicates:   Allergen Reactions    Amlodipine Itching     Other reaction(s): Propensity to adverse reactions to drug    Atorvastatin      Other reaction(s): body aches    Metoprolol      Other reaction(s): body aches    Morphine Itching     Other reaction(s): Hallucinations    Ranitidine      Other reaction(s): Propensity to adverse reactions to drug    Grass pollen-june grass standard Rash     Past Medical History:   Diagnosis Date    Arthritis     Heart attack     High cholesterol     Hypertension     Sleep apnea     wears CPAP     Past Surgical History:   Procedure Laterality Date    ANGIOGRAPHY      BACK SURGERY      x 2    COLONOSCOPY  06/01/2016    coronary stents      6  stents    FLUOROSCOPY      GANGLION CYST EXCISION      HERNIA REPAIR      LAPAROSCOPIC CHOLECYSTECTOMY N/A 11/18/2022    Procedure: CHOLECYSTECTOMY, LAPAROSCOPIC;  Surgeon: Tito Palomino MD;  Location: [facility];  Service: General;  Laterality: N/A;    NECK SURGERY      plates /screws    PENILE PROSTHESIS IMPLANT      PROSTATECTOMY      ROTATOR CUFF REPAIR Left     TONSILLECTOMY      TOTAL HIP ARTHROPLASTY Bilateral     TOTAL KNEE ARTHROPLASTY Bilateral 12/17/2018     Family History   Problem Relation Name Age of Onset    Hyperlipidemia Mother      Heart failure Mother      Hyperlipidemia Father      Heart attack Sister      Coronary artery disease Sister      Cancer Brother       Social  History[1]  Review of Systems   Constitutional:  Negative for fever.   HENT:  Negative for sore throat.    Respiratory:  Positive for cough and shortness of breath.    Cardiovascular:  Negative for chest pain.   Gastrointestinal:  Negative for nausea.   Genitourinary:  Negative for dysuria.   Musculoskeletal:  Negative for back pain.   Skin:  Negative for rash.   Neurological:  Negative for weakness.   Hematological:  Does not bruise/bleed easily.   All other systems reviewed and are negative.      Physical Exam     Initial Vitals [03/10/25 0933]   BP Pulse Resp Temp SpO2   (!) 147/82 100 (!) 24 98.5 °F (36.9 °C) (!) 92 %      MAP       --         Physical Exam    Nursing note and vitals reviewed.  Constitutional: He appears well-developed and well-nourished. He is active.   HENT:   Head: Normocephalic and atraumatic.   Eyes: Conjunctivae, EOM and lids are normal. Pupils are equal, round, and reactive to light.   Neck: Trachea normal and phonation normal. Neck supple. No thyroid mass present.   Normal range of motion.  Cardiovascular:  Normal rate, regular rhythm, normal heart sounds and normal pulses.           Pulmonary/Chest: He has rhonchi.   Abdominal: Abdomen is soft. Bowel sounds are normal.   Musculoskeletal:         General: Normal range of motion.      Cervical back: Normal range of motion and neck supple.     Neurological: He is alert and oriented to person, place, and time. He has normal strength and normal reflexes.   Skin: Skin is warm, dry and intact.   Psychiatric: He has a normal mood and affect. His speech is normal and behavior is normal. Judgment and thought content normal. Cognition and memory are normal.         ED Course   Procedures  Labs Reviewed   COMPREHENSIVE METABOLIC PANEL - Abnormal       Result Value    Sodium 143      Potassium 4.6      Chloride 109 (*)     CO2 26      Glucose 110      Blood Urea Nitrogen 25.1      Creatinine 1.24      Calcium 9.6      Protein Total 7.6      Albumin  3.8      Globulin 3.8 (*)     Albumin/Globulin Ratio 1.0 (*)     Bilirubin Total 1.1      ALP 78      ALT 26      AST 36 (*)     eGFR 59      Anion Gap 8.0      BUN/Creatinine Ratio 20     D DIMER, QUANTITATIVE - Abnormal    D-Dimer 0.72 (*)    CBC WITH DIFFERENTIAL - Abnormal    WBC 6.99      RBC 6.26 (*)     Hgb 17.6      Hct 54.7 (*)     MCV 87.4      MCH 28.1      MCHC 32.2 (*)     RDW 15.0      Platelet 161      MPV 9.4      Neut % 64.1      Lymph % 24.7      Mono % 8.4      Eos % 2.0      Basophil % 0.7      Imm Grans % 0.1      Neut # 4.47      Lymph # 1.73      Mono # 0.59      Eos # 0.14      Baso # 0.05      Imm Gran # 0.01     COVID/RSV/FLU A&B PCR - Normal    Influenza A PCR Not Detected      Influenza B PCR Not Detected      Respiratory Syncytial Virus PCR Not Detected      SARS-CoV-2 PCR Not Detected      Narrative:     The Xpert Xpress SARS-CoV-2/FLU/RSV plus is a rapid, multiplexed real-time PCR test intended for the simultaneous qualitative detection and differentiation of SARS-CoV-2, Influenza A, Influenza B, and respiratory syncytial virus (RSV) viral RNA in either nasopharyngeal swab or nasal swab specimens.         B-TYPE NATRIURETIC PEPTIDE - Normal    Natriuretic Peptide 12.3     TROPONIN I - Normal    Troponin-I <0.010     PROTIME-INR - Normal    PT 12.7      INR 1.3      Narrative:     Protimes are used to monitor anticoagulant agents such as warfarin. PT INR values are based on the current patient normal mean and the CHAKA value for the specific instrument reagent used.  **Routine theraputic target values for the INR are 2.0-3.0**   MAGNESIUM - Normal    Magnesium Level 1.80     BLOOD CULTURE OLG   BLOOD CULTURE [facility]   CBC W/ AUTO DIFFERENTIAL    Narrative:     The following orders were created for panel order CBC Auto Differential.  Procedure                               Abnormality         Status                     ---------                               -----------         ------                      CBC with Differential[[id number]]       Abnormal            Final result                 Please view results for these tests on the individual orders.     EKG Readings: (Independently Interpreted)   Initial Reading: No STEMI. Rhythm: Normal Sinus Rhythm. Heart Rate: 86. Ectopy: No Ectopy. Conduction: Normal. ST Segments: Normal ST Segments. T Waves: Normal. Clinical Impression: Normal Sinus Rhythm       Imaging Results              CTA Chest Non-Coronary (PE Studies) (Final result)  Result time 03/10/25 11:08:21      Final result by Kvng Calvert MD (03/10/25 11:08:21)                   Impression:      No CT evidence of acute pulmonary embolism or other acute intrathoracic pathology.    Both lungs demonstrate mosaic perfusion pattern with superimposed fibrotic/chronic interstitial lung changes.      Electronically signed by: Kvng Calvert  Date:    03/10/2025  Time:    11:08               Narrative:    EXAMINATION:  CTA CHEST NON CORONARY (PE STUDIES)    CLINICAL HISTORY:  Pulmonary embolism (PE) suspected, positive D-dimer;    TECHNIQUE:  Axial CT images were obtained through the chest after IV administration of 100 mL Omnipaque 350 contrast.  MIP, coronal and sagittal reconstructions submitted and interpreted. Dose reduction techniques including automatic exposure control (AEC) were utilized.    Total  mGycm    COMPARISON:  Chest radiograph 03/10/2025.  CT chest 09/19/2022.    FINDINGS:  1.2 cm partially calcified nodule of the left lobe of the thyroid.    The tracheobronchial tree is patent.  No evidence of pneumothorax or pleural effusion.  Fibrotic lung changes are noted bilaterally likely related to chronic interstitial lung disease.  There is also superimposed mosaic perfusion pattern which is nonspecific.    Normal size of the cardiac chambers.  No pericardial abnormality.    No evidence of thoracic aortic aneurysm or dissection.  Mild atherosclerosis of the thoracic aorta.  Multivessel  coronary artery atherosclerotic vascular disease.  Main pulmonary artery is normal in diameter.  No evidence of pulmonary artery embolus.    No evidence of mediastinal, hilar, or axillary lymphadenopathy.    Prior cholecystectomy.  No acute abnormality of the partially imaged superior abdomen.    Chest wall is unremarkable.  Diffuse idiopathic skeletal hyperostosis of the thoracic spine.  No acute or aggressive osseous abnormality.  Degenerative changes of both glenohumeral joints.                                       X-Ray Chest 1 View (Final result)  Result time 03/10/25 10:41:28      Final result by Stevie Evans MD (03/10/25 10:41:28)                   Impression:      Confluent airspace opacities in the left retrocardiac region might represent an infiltrate/atelectasis.    Otherwise no other significant change      Electronically signed by: Stevie Evans  Date:    03/10/2025  Time:    10:41               Narrative:    EXAMINATION:  XR CHEST 1 VIEW    CPT 30672    CLINICAL HISTORY:  Shortness of breath    COMPARISON:  February 17, 2025    FINDINGS:  Examination reveals mediastinal silhouette to be within normal limits cardiac silhouette is at the upper limits of normal.    Confluent airspace opacities identified in the left retrocardiac region with partial silhouetting of the left hemidiaphragm might represent an infiltrate/atelectasis.    No other focal consolidative changes                                       Medications   cefTRIAXone injection 1 g (has no administration in time range)   nitroGLYCERIN 2% TD oint ointment 0.5 inch (0.5 inches Topical (Top) Given 3/10/25 0957)   albuterol-ipratropium 2.5 mg-0.5 mg/3 mL nebulizer solution 3 mL (3 mLs Nebulization Given 3/10/25 1020)   iohexoL (OMNIPAQUE 350) injection 100 mL (100 mLs Intravenous Given 3/10/25 1047)     Medical Decision Making  Impression:     Confluent airspace opacities in the left retrocardiac region might represent an  infiltrate/atelectasis.     Otherwise no other significant change     Discussed case with Dr. Mishra we will admit to observation for patient's hypoxia workup negative for cardiac acute disease negative for acute PE x-ray shows a possible early infiltrate could be an early pneumonia but just some chronic interstitial lung disease worsening from infection admit for oxygen support neb treatments antibiotics and steroids    Amount and/or Complexity of Data Reviewed  Labs: ordered. Decision-making details documented in ED Course.  Radiology: ordered and independent interpretation performed.    Risk  Prescription drug management.  Decision regarding hospitalization.  Risk Details: Differential diagnosis COVID flu RSV pneumonia PE STEMI               ED Course as of 03/10/25 1136   Mon Mar 10, 2025   1008 WBC: 6.99 [BL]   1008 Hemoglobin: 17.6 [BL]   1008 Hematocrit(!): 54.7 [BL]   1027 D-Dimer(!): 0.72 [BL]   1027 PT: 12.7 [BL]   1027 Magnesium : 1.80 [BL]   1028 Troponin I: <0.010 [BL]   1028 BUN: 25.1 [BL]   1028 Creatinine: 1.24 [BL]   1038 Troponin I: <0.010 [BL]   1038 BNP: 12.3 [BL]   1039 Influenza A, Molecular: Not Detected [BL]   1039 Influenza B, Molecular: Not Detected [BL]   1039 RSV Ag by Molecular Method: Not Detected [BL]   1039 SARS-CoV2 (COVID-19) Qualitative PCR: Not Detected [BL]   1125 Impression:     Confluent airspace opacities in the left retrocardiac region might represent an infiltrate/atelectasis.     Otherwise no other significant change      [BL]   1131 Dr. Mishra consulted for observation admit to inpatient for hypoxia cough bronchitis possible pneumonia [BL]      ED Course User Index  [BL] Dale Benítez MD                           Clinical Impression:  Final diagnoses:  [R06.02] SOB (shortness of breath)  [R06.02] SOB (shortness of breath) on exertion          ED Disposition Condition    Observation Stable                  [1]   Social History  Tobacco Use    Smoking status: Never     Smokeless tobacco: Never   Substance Use Topics    Alcohol use: Not Currently    Drug use: Never        Dale Benítez MD  03/10/25 1134

## 2025-03-10 NOTE — H&P
[facility]  [facility] [place] MED[facility] - H&P ADMISSION NOTE      Patient Name: Augusto Snow  MRN: [id number]  Patient Class: OP- Observation   Admission Date: 3/10/2025   Admitting Physician:  Service   Attending Physician: Leigh Layne MD  Primary Care Provider: Chanda Lloyd NP  Face-to-Face encounter date: 03/10/2025        CHIEF COMPLAINT     Chief Complaint   Patient presents with    Shortness of Breath     SOB x 3 days -pain to post mid back        HISTORY OF PRESENTING ILLNESS   Code Status: Full  Social drivers: I have screed the patient for housing insecurity, food insecurity, access to follow up appointments as well as issues with safety at home and have not identified them at this time but will address as needs arise.       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  He was evaluated by his primary care provider (PCP) within the last week and was prescribed two rounds of antibiotics and nebulizer treatments but has not improved. He reports persistent cough but denies fever, chills, or recent illness exposure.  In the emergency department (ED), he underwent evaluation for cardiac ischemia given his history of CAD; however, troponins were negative. His D-dimer was mildly elevated (0.72), prompting a CT chest, which was negative for pulmonary embolism (PE) but showed fibrotic and chronic interstitial lung changes consistent with his known ILD.  On presentation, he was hypoxic to 89% on room air with shortness of breath at that level, improving to 94% on 1.5L oxygen. He was negative for influenza B, COVID-19, and RSV.  Given his worsening symptoms despite outpatient  treatment, he will be admitted for further management with:  IV antibiotics (Ciprofloxacin) due to underlying ILD and risk for atypical infections  Nebulizer treatments for symptomatic relief  Oxygen therapy as needed for hypoxia  CPAP use at night (patient will be asked to bring his machine)  Other pertinent history:  No leukocytosis  Normal kidney function  Extensive past surgical history  Plan is to monitor for clinical improvement and evaluate for potential exacerbation of ILD or underlying infectious process.    PAST MEDICAL HISTORY     Past Medical History:   Diagnosis Date    Arthritis     Heart attack     High cholesterol     Hypertension     Sleep apnea     wears CPAP       PAST SURGICAL HISTORY     Past Surgical History:   Procedure Laterality Date    ANGIOGRAPHY      BACK SURGERY      x 2    COLONOSCOPY  06/01/2016    coronary stents      6  stents    FLUOROSCOPY      GANGLION CYST EXCISION      HERNIA REPAIR      LAPAROSCOPIC CHOLECYSTECTOMY N/A 11/18/2022    Procedure: CHOLECYSTECTOMY, LAPAROSCOPIC;  Surgeon: Tito Palomino MD;  Location: [facility] OR;  Service: General;  Laterality: N/A;    NECK SURGERY      plates /screws    PENILE PROSTHESIS IMPLANT      PROSTATECTOMY      ROTATOR CUFF REPAIR Left     TONSILLECTOMY      TOTAL HIP ARTHROPLASTY Bilateral     TOTAL KNEE ARTHROPLASTY Bilateral 12/17/2018       FAMILY HISTORY   Reviewed and noncontributory to this case    SOCIAL HISTORY   Social History[1]    HOME MEDICATIONS     Prior to Admission medications    Medication Sig Start Date End Date Taking? Authorizing Provider   aspirin (ECOTRIN) 81 MG EC tablet Take 81 mg by mouth once daily.    Provider, Historical   carvediloL (COREG) 6.25 MG tablet Take 1 tablet (6.25 mg total) by mouth 2 (two) times daily with meals. 3/10/24 3/10/25  Cammy Tam MD   cholecalciferol, vitamin D3, (VITAMIN D3) 25 mcg (1,000 unit) capsule Take 1,000 Units by mouth once daily.    Provider, Historical   cyanocobalamin,  vitamin B-12, 50 mcg tablet Take 50 mcg by mouth. 9/8/21   Provider, Historical   DEXILANT 60 mg capsule TAKE ONE CAPSULE BY MOUTH EVERY DAY 3/2/23   Johny Griffith MD   diazePAM (VALIUM) 10 MG Tab TAKE ONE (1) TABLET BY MOUTH EVERY EIGHT (8) HOURS AS NEEDED FOR ANXIETY  Patient taking differently: Take 10 mg by mouth as needed (at bedtime). 11/28/23   Johny Griffith MD   ezetimibe (ZETIA) 10 mg tablet Take 1 tablet by mouth Daily. 5/19/23   Provider, Historical   fenofibrate (TRICOR) 145 MG tablet Take 1 tablet (145 mg total) by mouth once daily. 3/11/24 3/11/25  Cammy Tam MD   hydrALAZINE (APRESOLINE) 50 MG tablet Take 1 tablet (50 mg total) by mouth 3 (three) times daily. 3/10/24 3/10/25  Cammy Tam MD   HYDROcodone-acetaminophen (NORCO)  mg per tablet Take 1 tablet by mouth every 6 (six) hours as needed for Pain. 12/18/23   Johny Griffith MD   isosorbide mononitrate (IMDUR) 30 MG 24 hr tablet Take 30 mg by mouth once daily. 9/6/21   Provider, Historical   multivitamin with minerals tablet Take 1 tablet by mouth once daily.    Provider, Historical   nitroGLYCERIN (NITROSTAT) 0.4 MG SL tablet Nitrostat 0.4 mg sublingual tablet   Place by sublingual route. 6/3/21   Provider, Historical   rivaroxaban (XARELTO) 2.5 mg Tab Take 2.5 mg by mouth 2 (two) times daily. 2/16/22   Provider, Historical   rosuvastatin (CRESTOR) 40 MG Tab Take 1 tablet by mouth Daily. 2/8/23   Provider, Historical   sertraline (ZOLOFT) 50 MG tablet Take 1 tablet (50 mg total) by mouth once daily.  Patient not taking: Reported on 3/12/2024 3/10/24 3/10/25  Cammy Tam MD   triamcinolone acetonide 0.1% (KENALOG) 0.1 % cream Apply topically. 9/8/21   Provider, Historical   valsartan (DIOVAN) 160 MG tablet Take 1 tablet (160 mg total) by mouth once daily. 3/11/24 3/11/25  Cammy Tam MD       ALLERGIES   Amlodipine, Atorvastatin, Metoprolol, Morphine, Ranitidine, and Grass pollen-darek grass  standard          REVIEW OF SYSTEMS   Except as documented above, all other systems reviewed and negative    PHYSICAL EXAM     Vitals:    03/10/25 1130   BP: (!) 146/82   Pulse: 79   Resp:    Temp:       General:  In no acute distress, resting comfortably  Head and neck:  Atraumatic, normocephalic, moist mucous membranes, supple neck  Chest:  Coarse BL  Heart:  S1, S2, no appreciable murmur  Abdomen:  Soft, nontender, BS +  MSK:  Warm, no lower extremity edema, no clubbing or cyanosis  Neuro:  Alert and oriented x4, moving all extremities with good strength  Integumentary:  No obvious skin rash  Psychiatry:  Appropriate mood and affect          ASSESSMENT AND PLAN   There are no hospital problems to display for this patient.     1. Hypoxic Respiratory Distress, Likely Infectious vs ILD Exacerbation  Admit for oxygen therapy (maintain SpO? > 92%)  Continue nebulizer treatments as needed for symptom relief  Monitor for signs of respiratory failure  ABG   2. Empiric Antibiotic Therapy for Possible Infectious Component  IV Ciprofloxacin for broad coverage (adjust based on culture results)  Monitor for response to antibiotics  Consider sputum culture if productive cough  If no improvement, consider alternative coverage or evaluation for non-infectious etiology  3. Coronary Artery Disease (CAD), History of Stents  Troponins negative; no current ischemic changes  Continue home cardiac medications   Monitor for any new cardiac symptoms  4. Obstructive Sleep Apnea (LUIS)  Request that patient brings CPAP from home  Ensure proper CPAP use at night  5. Interstitial Lung Disease (ILD) - Chronic Fibrotic Changes  Monitor for progression of ILD or acute exacerbation  Consider pulmonary consult for further evaluation  If concern for ILD exacerbation, consider steroids (balance risks/benefits)  6. Other Chronic Conditions (HTN, Hyperlipidemia, Arthritis)  Continue home medications if stable  Monitor BP and cholesterol levels as  needed  7. Disposition & Follow-Up  Admit to hospital for close monitoring  Discharge planning to include pulmonary follow-up  If improving, consider gradual weaning of oxygen prior to discharge    DVT prophylaxis:  Appears to be on Xarelto at home however we are currently in the process of updating his medical reconciliation.  __________________________________________________________________  LABS/MICRO/MEDS/DIAGNOSTICS       LABS  Recent Labs     03/10/25  0950      K 4.6   CO2 26   BUN 25.1   CREATININE 1.24   GLUCOSE 110   CALCIUM 9.6   ALKPHOS 78   AST 36*   ALT 26   ALBUMIN 3.8     Recent Labs     03/10/25  0950   WBC 6.99   RBC 6.26*   HCT 54.7*   MCV 87.4          MICROBIOLOGY  Microbiology Results (last 7 days)       Procedure Component Value Units Date/Time    Blood Culture [[id number]] Collected: 03/10/25 1142    Order Status: Sent Specimen: Blood Updated: 03/10/25 1151    Blood Culture [[id number]] Collected: 03/10/25 1138    Order Status: Sent Specimen: Blood from Arm, Left Updated: 03/10/25 1149            MEDICATIONS   albuterol-ipratropium  3 mL Nebulization Q4H    [START ON 3/11/2025] cefTRIAXone  1 g Intravenous ED 1 Time    enoxparin  40 mg Subcutaneous Daily    methylPREDNISolone injection (PEDS and ADULTS)  60 mg Intravenous Q6H      INFUSIONS      DIAGNOSTIC TESTS  CTA Chest Non-Coronary (PE Studies)   Final Result      No CT evidence of acute pulmonary embolism or other acute intrathoracic pathology.      Both lungs demonstrate mosaic perfusion pattern with superimposed fibrotic/chronic interstitial lung changes.         Electronically signed by: Kvng Calvert   Date:    03/10/2025   Time:    11:08      X-Ray Chest 1 View   Final Result      Confluent airspace opacities in the left retrocardiac region might represent an infiltrate/atelectasis.      Otherwise no other significant change         Electronically signed by: Stevie Evans   Date:    03/10/2025   Time:    10:41              Patient information was obtained from patient, patient's family, past medical records and ER records.   All diagnosis and differential diagnosis have been reviewed; assessment and plan has been documented. I have personally reviewed the labs and test results that are presently available; I have reviewed the patients medication list. I have reviewed the consulting providers response and recommendations. I have reviewed or attempted to review medical records based upon their availability.  All of the patient's questions have been addressed and answered. Patient's is agreeable to the above stated plan. I will continue to monitor closely and make adjustments to medical management as needed.  This note was created using [employer] voice recognition software that occasionally misinterpreted phrases or words.  Please contact me if any questions may rise regarding documentation to clarify verbiage.        Leigh Layne MD   Internal Medicine  Department of Hospital Medicine [facility]         [1]   Social History  Socioeconomic History    Marital status:    Tobacco Use    Smoking status: Never    Smokeless tobacco: Never   Substance and Sexual Activity    Alcohol use: Not Currently    Drug use: Never    Sexual activity: Not Currently

## 2025-03-11 PROBLEM — J84.9 CHRONIC INTERSTITIAL LUNG DISEASE: Status: ACTIVE | Noted: 2025-03-11

## 2025-03-11 LAB
ALBUMIN SERPL-MCNC: 3.5 G/DL (ref 3.4–4.8)
ALBUMIN/GLOB SERPL: 1.1 RATIO (ref 1.1–2)
ALP SERPL-CCNC: 82 UNIT/L (ref 40–150)
ALT SERPL-CCNC: 23 UNIT/L (ref 0–55)
ANION GAP SERPL CALC-SCNC: 10 MEQ/L
AST SERPL-CCNC: 32 UNIT/L (ref 5–34)
BASOPHILS # BLD AUTO: 0.01 X10(3)/MCL
BASOPHILS NFR BLD AUTO: 0.3 %
BILIRUB SERPL-MCNC: 0.5 MG/DL
BUN SERPL-MCNC: 27.3 MG/DL (ref 8.4–25.7)
CALCIUM SERPL-MCNC: 9.3 MG/DL (ref 8.8–10)
CHLORIDE SERPL-SCNC: 109 MMOL/L (ref 98–107)
CO2 SERPL-SCNC: 23 MMOL/L (ref 23–31)
CREAT SERPL-MCNC: 1.24 MG/DL (ref 0.72–1.25)
CREAT/UREA NIT SERPL: 22
EOSINOPHIL # BLD AUTO: 0 X10(3)/MCL (ref 0–0.9)
EOSINOPHIL NFR BLD AUTO: 0 %
ERYTHROCYTE [DISTWIDTH] IN BLOOD BY AUTOMATED COUNT: 14.5 % (ref 11.5–17)
GFR SERPLBLD CREATININE-BSD FMLA CKD-EPI: 59 ML/MIN/1.73/M2
GLOBULIN SER-MCNC: 3.3 GM/DL (ref 2.4–3.5)
GLUCOSE SERPL-MCNC: 201 MG/DL (ref 82–115)
HCT VFR BLD AUTO: 51.2 % (ref 42–52)
HGB BLD-MCNC: 16.6 G/DL (ref 14–18)
IMM GRANULOCYTES # BLD AUTO: 0.01 X10(3)/MCL (ref 0–0.04)
IMM GRANULOCYTES NFR BLD AUTO: 0.3 %
LYMPHOCYTES # BLD AUTO: 0.34 X10(3)/MCL (ref 0.6–4.6)
LYMPHOCYTES NFR BLD AUTO: 8.7 %
MAGNESIUM SERPL-MCNC: 2 MG/DL (ref 1.6–2.6)
MCH RBC QN AUTO: 28.4 PG (ref 27–31)
MCHC RBC AUTO-ENTMCNC: 32.4 G/DL (ref 33–36)
MCV RBC AUTO: 87.7 FL (ref 80–94)
MONOCYTES # BLD AUTO: 0.01 X10(3)/MCL (ref 0.1–1.3)
MONOCYTES NFR BLD AUTO: 0.3 %
NEUTROPHILS # BLD AUTO: 3.54 X10(3)/MCL (ref 2.1–9.2)
NEUTROPHILS NFR BLD AUTO: 90.4 %
OHS QRS DURATION: 94 MS
OHS QTC CALCULATION: 464 MS
PHOSPHATE SERPL-MCNC: 2.8 MG/DL (ref 2.3–4.7)
PLATELET # BLD AUTO: 129 X10(3)/MCL (ref 130–400)
PMV BLD AUTO: 9.7 FL (ref 7.4–10.4)
POTASSIUM SERPL-SCNC: 4.8 MMOL/L (ref 3.5–5.1)
PROT SERPL-MCNC: 6.8 GM/DL (ref 5.8–7.6)
RBC # BLD AUTO: 5.84 X10(6)/MCL (ref 4.7–6.1)
SODIUM SERPL-SCNC: 142 MMOL/L (ref 136–145)
WBC # BLD AUTO: 3.91 X10(3)/MCL (ref 4.5–11.5)

## 2025-03-11 PROCEDURE — 25000242 PHARM REV CODE 250 ALT 637 W/ HCPCS: Performed by: FAMILY MEDICINE

## 2025-03-11 PROCEDURE — 25000003 PHARM REV CODE 250: Performed by: INTERNAL MEDICINE

## 2025-03-11 PROCEDURE — 84100 ASSAY OF PHOSPHORUS: CPT | Performed by: INTERNAL MEDICINE

## 2025-03-11 PROCEDURE — 63600175 PHARM REV CODE 636 W HCPCS: Mod: JZ,TB | Performed by: INTERNAL MEDICINE

## 2025-03-11 PROCEDURE — 27000221 HC OXYGEN, UP TO 24 HOURS

## 2025-03-11 PROCEDURE — 83735 ASSAY OF MAGNESIUM: CPT | Performed by: INTERNAL MEDICINE

## 2025-03-11 PROCEDURE — 85025 COMPLETE CBC W/AUTO DIFF WBC: CPT | Performed by: INTERNAL MEDICINE

## 2025-03-11 PROCEDURE — 25000242 PHARM REV CODE 250 ALT 637 W/ HCPCS: Performed by: INTERNAL MEDICINE

## 2025-03-11 PROCEDURE — 99900035 HC TECH TIME PER 15 MIN (STAT)

## 2025-03-11 PROCEDURE — 5A09357 ASSISTANCE WITH RESPIRATORY VENTILATION, LESS THAN 24 CONSECUTIVE HOURS, CONTINUOUS POSITIVE AIRWAY PRESSURE: ICD-10-PCS | Performed by: FAMILY MEDICINE

## 2025-03-11 PROCEDURE — 87798 DETECT AGENT NOS DNA AMP: CPT | Performed by: INTERNAL MEDICINE

## 2025-03-11 PROCEDURE — 94640 AIRWAY INHALATION TREATMENT: CPT

## 2025-03-11 PROCEDURE — 36415 COLL VENOUS BLD VENIPUNCTURE: CPT | Performed by: INTERNAL MEDICINE

## 2025-03-11 PROCEDURE — 80053 COMPREHEN METABOLIC PANEL: CPT | Performed by: INTERNAL MEDICINE

## 2025-03-11 PROCEDURE — 21400001 HC TELEMETRY ROOM

## 2025-03-11 PROCEDURE — 94760 N-INVAS EAR/PLS OXIMETRY 1: CPT

## 2025-03-11 RX ORDER — METHYLPREDNISOLONE SOD SUCC 125 MG
60 VIAL (EA) INJECTION EVERY 6 HOURS
Status: DISCONTINUED | OUTPATIENT
Start: 2025-03-11 | End: 2025-03-11

## 2025-03-11 RX ORDER — DEXAMETHASONE SODIUM PHOSPHATE 4 MG/ML
8 INJECTION, SOLUTION INTRA-ARTICULAR; INTRALESIONAL; INTRAMUSCULAR; INTRAVENOUS; SOFT TISSUE EVERY 12 HOURS
Status: DISCONTINUED | OUTPATIENT
Start: 2025-03-11 | End: 2025-03-13 | Stop reason: HOSPADM

## 2025-03-11 RX ADMIN — IPRATROPIUM BROMIDE AND ALBUTEROL SULFATE 3 ML: .5; 3 SOLUTION RESPIRATORY (INHALATION) at 03:03

## 2025-03-11 RX ADMIN — Medication 5000 UNITS: at 08:03

## 2025-03-11 RX ADMIN — IPRATROPIUM BROMIDE AND ALBUTEROL SULFATE 3 ML: .5; 3 SOLUTION RESPIRATORY (INHALATION) at 07:03

## 2025-03-11 RX ADMIN — DEXAMETHASONE SODIUM PHOSPHATE 8 MG: 4 INJECTION, SOLUTION INTRA-ARTICULAR; INTRALESIONAL; INTRAMUSCULAR; INTRAVENOUS; SOFT TISSUE at 08:03

## 2025-03-11 RX ADMIN — METHYLPREDNISOLONE SODIUM SUCCINATE 60 MG: 40 INJECTION, POWDER, FOR SOLUTION INTRAMUSCULAR; INTRAVENOUS at 05:03

## 2025-03-11 RX ADMIN — FENOFIBRATE 145 MG: 145 TABLET, FILM COATED ORAL at 08:03

## 2025-03-11 RX ADMIN — PANTOPRAZOLE SODIUM 40 MG: 40 TABLET, DELAYED RELEASE ORAL at 04:03

## 2025-03-11 RX ADMIN — IPRATROPIUM BROMIDE AND ALBUTEROL SULFATE 3 ML: .5; 3 SOLUTION RESPIRATORY (INHALATION) at 04:03

## 2025-03-11 RX ADMIN — RIVAROXABAN 2.5 MG: 2.5 TABLET, FILM COATED ORAL at 08:03

## 2025-03-11 RX ADMIN — ASPIRIN 81 MG: 81 TABLET, COATED ORAL at 08:03

## 2025-03-11 RX ADMIN — IPRATROPIUM BROMIDE AND ALBUTEROL SULFATE 3 ML: .5; 3 SOLUTION RESPIRATORY (INHALATION) at 08:03

## 2025-03-11 RX ADMIN — AMLODIPINE BESYLATE 5 MG: 5 TABLET ORAL at 08:03

## 2025-03-11 RX ADMIN — IPRATROPIUM BROMIDE AND ALBUTEROL SULFATE 3 ML: .5; 3 SOLUTION RESPIRATORY (INHALATION) at 12:03

## 2025-03-11 RX ADMIN — THERA TABS 1 TABLET: TAB at 08:03

## 2025-03-11 RX ADMIN — VORTIOXETINE 10 MG: 10 TABLET, FILM COATED ORAL at 01:03

## 2025-03-11 RX ADMIN — BUDESONIDE 0.5 MG: 0.5 INHALANT RESPIRATORY (INHALATION) at 07:03

## 2025-03-11 RX ADMIN — CIPROFLOXACIN 400 MG: 2 INJECTION, SOLUTION INTRAVENOUS at 01:03

## 2025-03-11 RX ADMIN — POLYETHYLENE GLYCOL 3350 17 G: 17 POWDER, FOR SOLUTION ORAL at 02:03

## 2025-03-11 RX ADMIN — DEXAMETHASONE SODIUM PHOSPHATE 8 MG: 4 INJECTION, SOLUTION INTRA-ARTICULAR; INTRALESIONAL; INTRAMUSCULAR; INTRAVENOUS; SOFT TISSUE at 11:03

## 2025-03-11 RX ADMIN — CYANOCOBALAMIN TAB 1000 MCG 1000 MCG: 1000 TAB at 08:03

## 2025-03-11 RX ADMIN — ENOXAPARIN SODIUM 40 MG: 40 INJECTION SUBCUTANEOUS at 04:03

## 2025-03-11 RX ADMIN — BUDESONIDE 0.5 MG: 0.5 INHALANT RESPIRATORY (INHALATION) at 08:03

## 2025-03-11 NOTE — PLAN OF CARE
Problem: Adult Inpatient Plan of Care  Goal: Plan of Care Review  Outcome: Progressing  Flowsheets (Taken 3/11/2025 0356)  Plan of Care Reviewed With: patient  Goal: Patient-Specific Goal (Individualized)  Outcome: Progressing  Flowsheets (Taken 3/11/2025 0356)  Individualized Care Needs: Monitor O2 Sat, Monitor VS, Safety with mobility  Anxieties, Fears or Concerns: none at this time  Patient/Family-Specific Goals (Include Timeframe): return to baseline by ALLAN  Goal: Absence of Hospital-Acquired Illness or Injury  Outcome: Progressing  Intervention: Prevent and Manage VTE (Venous Thromboembolism) Risk  Flowsheets (Taken 3/10/2025 2000)  VTE Prevention/Management:   bleeding precautions maintained   bleeding risk assessed   fluids promoted   ROM (active) performed   dorsiflexion/plantar flexion performed  Goal: Optimal Comfort and Wellbeing  Outcome: Progressing

## 2025-03-11 NOTE — PLAN OF CARE
Problem: Adult Inpatient Plan of Care  Goal: Plan of Care Review  Outcome: Progressing  Flowsheets (Taken 3/11/2025 0800)  Plan of Care Reviewed With: patient  Goal: Patient-Specific Goal (Individualized)  Outcome: Progressing  Flowsheets (Taken 3/11/2025 0800)  Individualized Care Needs: Monitor O2 Sat, Monitor VS, Safety with mobility  Anxieties, Fears or Concerns: None at this time  Patient/Family-Specific Goals (Include Timeframe): Return to baseline by ALLAN  Goal: Absence of Hospital-Acquired Illness or Injury  Outcome: Progressing  Goal: Optimal Comfort and Wellbeing  Outcome: Progressing  Goal: Readiness for Transition of Care  Outcome: Progressing     Problem: Infection  Goal: Absence of Infection Signs and Symptoms  Outcome: Progressing

## 2025-03-11 NOTE — PLAN OF CARE
Problem: Adult Inpatient Plan of Care  Goal: Plan of Care Review  Outcome: Progressing  Flowsheets (Taken 3/10/2025 1230)  Plan of Care Reviewed With: patient  Goal: Patient-Specific Goal (Individualized)  Outcome: Progressing  Flowsheets (Taken 3/10/2025 1230)  Individualized Care Needs: Monitor oxygen saturation, monitor vital signs, safety with mobility  Anxieties, Fears or Concerns: None expressed at this time  Patient/Family-Specific Goals (Include Timeframe): Return to baseline by ALLAN  Goal: Absence of Hospital-Acquired Illness or Injury  Outcome: Progressing  Intervention: Prevent Skin Injury  Flowsheets (Taken 3/10/2025 1230)  Device Skin Pressure Protection:   adhesive use limited   tubing/devices free from skin contact  Goal: Optimal Comfort and Wellbeing  Outcome: Progressing  Goal: Readiness for Transition of Care  Outcome: Progressing  Intervention: Mutually Develop Transition Plan  Flowsheets (Taken 3/10/2025 1230)  Transportation Anticipated: family or friend will provide  Who are your caregiver(s) and their phone number(s)?: Miguelangel Rai   Communicated ALLAN with patient/caregiver: Date not available/Unable to determine  Do you expect to return to your current living situation?: Yes  Do you have help at home or someone to help you manage your care at home?: Yes  Readmission within 30 days?: No  Do you currently have service(s) that help you manage your care at home?: No     Problem: Infection  Goal: Absence of Infection Signs and Symptoms  Outcome: Progressing     Problem: Gas Exchange Impaired  Goal: Optimal Gas Exchange  Outcome: Progressing  Intervention: Optimize Oxygenation and Ventilation  Flowsheets (Taken 3/10/2025 1230)  Airway/Ventilation Management: airway patency maintained

## 2025-03-12 LAB
B PERT.PT PRMT NPH QL NAA+NON-PROBE: NOT DETECTED
C PNEUM DNA NPH QL NAA+NON-PROBE: NOT DETECTED
HADV DNA NPH QL NAA+NON-PROBE: NOT DETECTED
HCOV 229E RNA NPH QL NAA+NON-PROBE: NOT DETECTED
HCOV HKU1 RNA NPH QL NAA+NON-PROBE: NOT DETECTED
HCOV NL63 RNA NPH QL NAA+NON-PROBE: NOT DETECTED
HCOV OC43 RNA NPH QL NAA+NON-PROBE: NOT DETECTED
HMPV RNA NPH QL NAA+NON-PROBE: NOT DETECTED
HPIV1 RNA NPH QL NAA+NON-PROBE: NOT DETECTED
HPIV2 RNA NPH QL NAA+NON-PROBE: NOT DETECTED
HPIV3 RNA NPH QL NAA+NON-PROBE: NOT DETECTED
HPIV4 RNA NPH QL NAA+NON-PROBE: NOT DETECTED
M PNEUMO DNA NPH QL NAA+NON-PROBE: NOT DETECTED
RSV RNA NPH QL NAA+NON-PROBE: NOT DETECTED
RV+EV RNA NPH QL NAA+NON-PROBE: NOT DETECTED

## 2025-03-12 PROCEDURE — 21400001 HC TELEMETRY ROOM

## 2025-03-12 PROCEDURE — 63600175 PHARM REV CODE 636 W HCPCS: Performed by: INTERNAL MEDICINE

## 2025-03-12 PROCEDURE — 94640 AIRWAY INHALATION TREATMENT: CPT | Mod: XB

## 2025-03-12 PROCEDURE — 94640 AIRWAY INHALATION TREATMENT: CPT

## 2025-03-12 PROCEDURE — 25000242 PHARM REV CODE 250 ALT 637 W/ HCPCS: Performed by: INTERNAL MEDICINE

## 2025-03-12 PROCEDURE — 94760 N-INVAS EAR/PLS OXIMETRY 1: CPT

## 2025-03-12 PROCEDURE — 25000242 PHARM REV CODE 250 ALT 637 W/ HCPCS: Performed by: FAMILY MEDICINE

## 2025-03-12 PROCEDURE — 25000003 PHARM REV CODE 250: Performed by: INTERNAL MEDICINE

## 2025-03-12 PROCEDURE — 94618 PULMONARY STRESS TESTING: CPT

## 2025-03-12 PROCEDURE — 99900035 HC TECH TIME PER 15 MIN (STAT)

## 2025-03-12 RX ADMIN — THERA TABS 1 TABLET: TAB at 09:03

## 2025-03-12 RX ADMIN — IPRATROPIUM BROMIDE AND ALBUTEROL SULFATE 3 ML: .5; 3 SOLUTION RESPIRATORY (INHALATION) at 04:03

## 2025-03-12 RX ADMIN — PANTOPRAZOLE SODIUM 40 MG: 40 TABLET, DELAYED RELEASE ORAL at 09:03

## 2025-03-12 RX ADMIN — CIPROFLOXACIN 400 MG: 2 INJECTION, SOLUTION INTRAVENOUS at 01:03

## 2025-03-12 RX ADMIN — IPRATROPIUM BROMIDE AND ALBUTEROL SULFATE 3 ML: .5; 3 SOLUTION RESPIRATORY (INHALATION) at 12:03

## 2025-03-12 RX ADMIN — RIVAROXABAN 2.5 MG: 2.5 TABLET, FILM COATED ORAL at 09:03

## 2025-03-12 RX ADMIN — IPRATROPIUM BROMIDE AND ALBUTEROL SULFATE 3 ML: .5; 3 SOLUTION RESPIRATORY (INHALATION) at 08:03

## 2025-03-12 RX ADMIN — ASPIRIN 81 MG: 81 TABLET, COATED ORAL at 09:03

## 2025-03-12 RX ADMIN — DEXAMETHASONE SODIUM PHOSPHATE 8 MG: 4 INJECTION, SOLUTION INTRA-ARTICULAR; INTRALESIONAL; INTRAMUSCULAR; INTRAVENOUS; SOFT TISSUE at 09:03

## 2025-03-12 RX ADMIN — Medication 5000 UNITS: at 08:03

## 2025-03-12 RX ADMIN — BUDESONIDE 0.5 MG: 0.5 INHALANT RESPIRATORY (INHALATION) at 08:03

## 2025-03-12 RX ADMIN — IPRATROPIUM BROMIDE AND ALBUTEROL SULFATE 3 ML: .5; 3 SOLUTION RESPIRATORY (INHALATION) at 03:03

## 2025-03-12 RX ADMIN — AMLODIPINE BESYLATE 5 MG: 5 TABLET ORAL at 08:03

## 2025-03-12 RX ADMIN — FENOFIBRATE 145 MG: 145 TABLET, FILM COATED ORAL at 09:03

## 2025-03-12 RX ADMIN — VORTIOXETINE 10 MG: 10 TABLET, FILM COATED ORAL at 09:03

## 2025-03-12 RX ADMIN — IPRATROPIUM BROMIDE AND ALBUTEROL SULFATE 3 ML: .5; 3 SOLUTION RESPIRATORY (INHALATION) at 11:03

## 2025-03-12 RX ADMIN — RIVAROXABAN 2.5 MG: 2.5 TABLET, FILM COATED ORAL at 08:03

## 2025-03-12 RX ADMIN — DEXAMETHASONE SODIUM PHOSPHATE 8 MG: 4 INJECTION, SOLUTION INTRA-ARTICULAR; INTRALESIONAL; INTRAMUSCULAR; INTRAVENOUS; SOFT TISSUE at 08:03

## 2025-03-12 RX ADMIN — CYANOCOBALAMIN TAB 1000 MCG 1000 MCG: 1000 TAB at 08:03

## 2025-03-12 NOTE — PLAN OF CARE
Problem: Adult Inpatient Plan of Care  Goal: Plan of Care Review  Outcome: Progressing  Goal: Patient-Specific Goal (Individualized)  Outcome: Progressing  Goal: Absence of Hospital-Acquired Illness or Injury  Outcome: Progressing  Intervention: Identify and Manage Fall Risk  Flowsheets (Taken 3/12/2025 1111)  Safety Promotion/Fall Prevention:   assistive device/personal item within reach   bed alarm set   Fall Risk reviewed with patient/family   Fall Risk signage in place   gait belt with ambulation   high risk medications identified   in recliner, wheels locked   instructed to call staff for mobility   nonskid shoes/socks when out of bed   patient expresses understanding of fall risk and prevention   Roll belt self-releasing   side rails raised x 3  Intervention: Prevent Skin Injury  Flowsheets (Taken 3/12/2025 1111)  Body Position:   position changed independently   upper extremity elevated   weight shifting  Skin Protection: skin sealant/moisture barrier applied  Device Skin Pressure Protection:   absorbent pad utilized/changed   tubing/devices free from skin contact  Intervention: Prevent and Manage VTE (Venous Thromboembolism) Risk  Flowsheets (Taken 3/12/2025 1111)  VTE Prevention/Management:   bleeding risk assessed   fluids promoted  Intervention: Prevent Infection  Flowsheets (Taken 3/12/2025 1111)  Infection Prevention:   environmental surveillance performed   hand hygiene promoted   rest/sleep promoted   single patient room provided  Goal: Optimal Comfort and Wellbeing  Outcome: Progressing  Intervention: Monitor Pain and Promote Comfort  Flowsheets (Taken 3/12/2025 1111)  Pain Management Interventions:   pain management plan reviewed with patient/caregiver   pillow support provided   quiet environment facilitated   relaxation techniques promoted  Intervention: Provide Person-Centered Care  Flowsheets (Taken 3/12/2025 1111)  Trust Relationship/Rapport:   care explained   choices provided   emotional  support provided   empathic listening provided   questions answered   questions encouraged   reassurance provided   thoughts/feelings acknowledged  Goal: Readiness for Transition of Care  Outcome: Progressing  Intervention: Mutually Develop Transition Plan  Flowsheets (Taken 3/12/2025 1111)  Equipment Currently Used at Home: none  Transportation Anticipated:   family or friend will provide   car, drives self  Who are your caregiver(s) and their phone number(s)?: daughterCecelia [phone]  Communicated ALLAN with patient/caregiver: Date not available/Unable to determine  Do you expect to return to your current living situation?: Yes  Do you have help at home or someone to help you manage your care at home?: Yes  Readmission within 30 days?: No  Do you currently have service(s) that help you manage your care at home?: No     Problem: Infection  Goal: Absence of Infection Signs and Symptoms  Outcome: Progressing  Intervention: Prevent or Manage Infection  Flowsheets (Taken 3/12/2025 1111)  Fever Reduction/Comfort Measures:   lightweight bedding   lightweight clothing  Infection Management: aseptic technique maintained  Isolation Precautions:   precautions maintained   protective     Problem: Gas Exchange Impaired  Goal: Optimal Gas Exchange  Outcome: Progressing  Intervention: Optimize Oxygenation and Ventilation  Flowsheets (Taken 3/12/2025 1111)  Airway/Ventilation Management: calming measures promoted  Head of Bed (HOB) Positioning: HOB at 30-45 degrees

## 2025-03-12 NOTE — PROGRESS NOTES
[facility]  [facility] MEDICINE - Progress Note      Patient Name: Augusto Snow  MRN: [id number]  Patient Class: IP- Inpatient   Admission Date: 3/10/2025   Admitting Physician:  Service   Attending Physician: Leigh Layne MD  Primary Care Provider: Chanda Lloyd NP  Face-to-Face encounter date: 03/12/2025        CHIEF COMPLAINT     Chief Complaint   Patient presents with    Shortness of Breath     SOB x 3 days -pain to post mid back        HISTORY OF PRESENTING ILLNESS   Code Status: Full  Social drivers: I have screed the patient for housing insecurity, food insecurity, access to follow up appointments as well as issues with safety at home and have not identified them at this time but will address as needs arise.       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  He was evaluated by his primary care provider (PCP) within the last week and was prescribed two rounds of antibiotics and nebulizer treatments but has not improved. He reports persistent cough but denies fever, chills, or recent illness exposure.  In the emergency department (ED), he underwent evaluation for cardiac ischemia given his history of CAD; however, troponins were negative. His D-dimer was mildly elevated (0.72), prompting a CT chest, which was negative for pulmonary embolism (PE) but showed fibrotic and chronic interstitial lung changes consistent with his known ILD.  On presentation, he was hypoxic to 89% on room air with shortness of breath at that level, improving to 94% on 1.5L oxygen. He was negative for influenza B, COVID-19, and RSV.  Given his worsening symptoms despite outpatient treatment, he  will be admitted for further management with:  IV antibiotics (Ciprofloxacin) due to underlying ILD and risk for atypical infections  Nebulizer treatments for symptomatic relief  Oxygen therapy as needed for hypoxia  CPAP use at night (patient will be asked to bring his machine)  Other pertinent history:  No leukocytosis  Normal kidney function  Extensive past surgical history  Plan is to monitor for clinical improvement and evaluate for potential exacerbation of ILD or underlying infectious process.    03/11:  Patient is on 1 L today goal is to wean to room air.  Respiratory therapist in nursing student in room upon assessment of patient.  03/12: Seen and examined and on RA, will make sure he does not need home O2. Review of old PFT shows severe obstruction.     PAST MEDICAL HISTORY     Past Medical History:   Diagnosis Date    Arthritis     Heart attack     High cholesterol     Hypertension     Sleep apnea     wears CPAP       PAST SURGICAL HISTORY     Past Surgical History:   Procedure Laterality Date    ANGIOGRAPHY      BACK SURGERY      x 2    COLONOSCOPY  06/01/2016    coronary stents      5  stents    FLUOROSCOPY      GANGLION CYST EXCISION      HERNIA REPAIR      LAPAROSCOPIC CHOLECYSTECTOMY N/A 11/18/2022    Procedure: CHOLECYSTECTOMY, LAPAROSCOPIC;  Surgeon: Tito Palomino MD;  Location: [facility];  Service: General;  Laterality: N/A;    NECK SURGERY      plates /screws    PENILE PROSTHESIS IMPLANT      PROSTATECTOMY      ROTATOR CUFF REPAIR Left     TONSILLECTOMY      TOTAL HIP ARTHROPLASTY Bilateral     TOTAL KNEE ARTHROPLASTY Bilateral 12/17/2018       FAMILY HISTORY   Reviewed and noncontributory to this case    SOCIAL HISTORY   Social History[1]    HOME MEDICATIONS     Prior to Admission medications    Medication Sig Start Date End Date Taking? Authorizing Provider   aspirin (ECOTRIN) 81 MG EC tablet Take 81 mg by mouth once daily.    Provider, Historical   carvediloL (COREG) 6.25 MG tablet Take 1  tablet (6.25 mg total) by mouth 2 (two) times daily with meals. 3/10/24 3/10/25  Cammy Tam MD   cholecalciferol, vitamin D3, (VITAMIN D3) 25 mcg (1,000 unit) capsule Take 1,000 Units by mouth once daily.    Provider, Historical   cyanocobalamin, vitamin B-12, 50 mcg tablet Take 50 mcg by mouth. 9/8/21   Provider, Historical   DEXILANT 60 mg capsule TAKE ONE CAPSULE BY MOUTH EVERY DAY 3/2/23   Johny Griffith MD   diazePAM (VALIUM) 10 MG Tab TAKE ONE (1) TABLET BY MOUTH EVERY EIGHT (8) HOURS AS NEEDED FOR ANXIETY  Patient taking differently: Take 10 mg by mouth as needed (at bedtime). 11/28/23   Johny Griffith MD   ezetimibe (ZETIA) 10 mg tablet Take 1 tablet by mouth Daily. 5/19/23   Provider, Historical   fenofibrate (TRICOR) 145 MG tablet Take 1 tablet (145 mg total) by mouth once daily. 3/11/24 3/11/25  Cammy Tam MD   hydrALAZINE (APRESOLINE) 50 MG tablet Take 1 tablet (50 mg total) by mouth 3 (three) times daily. 3/10/24 3/10/25  Cammy Tam MD   HYDROcodone-acetaminophen (NORCO)  mg per tablet Take 1 tablet by mouth every 6 (six) hours as needed for Pain. 12/18/23   Johny Griffith MD   isosorbide mononitrate (IMDUR) 30 MG 24 hr tablet Take 30 mg by mouth once daily. 9/6/21   Provider, Historical   multivitamin with minerals tablet Take 1 tablet by mouth once daily.    Provider, Historical   nitroGLYCERIN (NITROSTAT) 0.4 MG SL tablet Nitrostat 0.4 mg sublingual tablet   Place by sublingual route. 6/3/21   Provider, Historical   rivaroxaban (XARELTO) 2.5 mg Tab Take 2.5 mg by mouth 2 (two) times daily. 2/16/22   Provider, Historical   rosuvastatin (CRESTOR) 40 MG Tab Take 1 tablet by mouth Daily. 2/8/23   Provider, Historical   sertraline (ZOLOFT) 50 MG tablet Take 1 tablet (50 mg total) by mouth once daily.  Patient not taking: Reported on 3/12/2024 3/10/24 3/10/25  Cammy Tam MD   triamcinolone acetonide 0.1% (KENALOG) 0.1 % cream Apply topically. 9/8/21   Provider,  Historical   valsartan (DIOVAN) 160 MG tablet Take 1 tablet (160 mg total) by mouth once daily. 3/11/24 3/11/25  Cammy Tam MD       ALLERGIES   Amlodipine, Atorvastatin, Metoprolol, Morphine, Ranitidine, and Grass pollen-darek grass standard          REVIEW OF SYSTEMS   Except as documented above, all other systems reviewed and negative    PHYSICAL EXAM     Vitals:    03/12/25 0841   BP:    Pulse: 95   Resp: 18   Temp:       General:  In no acute distress, resting comfortably  Head and neck:  Atraumatic, normocephalic, moist mucous membranes, supple neck  Chest:  Coarse BL  Heart:  S1, S2, no appreciable murmur  Abdomen:  Soft, nontender, BS +  MSK:  Warm, no lower extremity edema, no clubbing or cyanosis  Neuro:  Alert and oriented x4, moving all extremities with good strength  Integumentary:  No obvious skin rash  Psychiatry:  Appropriate mood and affect          ASSESSMENT AND PLAN     Active Hospital Problems    Diagnosis  POA    *Chronic interstitial lung disease [J84.9]  Yes      Resolved Hospital Problems   No resolved problems to display.      1. Hypoxic Respiratory Distress, Likely Infectious vs ILD Exacerbation:  Patient has fibrotic and chronic interstitial lung disease.  Admit for oxygen therapy (maintain SpO? > 92%)  Continue nebulizer treatments as needed for symptom relief  Monitor for signs of respiratory failure  ABG   2. Empiric Antibiotic Therapy for Possible Infectious Component  IV Ciprofloxacin for broad coverage (adjust based on culture results)  Monitor for response to antibiotics  Consider sputum culture if productive cough  If no improvement, consider alternative coverage or evaluation for non-infectious etiology  3. Coronary Artery Disease (CAD), History of Stents  Troponins negative; no current ischemic changes  Continue home cardiac medications   Monitor for any new cardiac symptoms  4. Obstructive Sleep Apnea (LUIS)  Request that patient brings CPAP from home  Ensure proper CPAP  use at night  5. Interstitial Lung Disease (ILD) - Chronic Fibrotic Changes  Monitor for progression of ILD or acute exacerbation  Consider pulmonary consult for further evaluation  If concern for ILD exacerbation, consider steroids (balance risks/benefits)  6. Other Chronic Conditions (HTN, Hyperlipidemia, Arthritis)  Continue home medications if stable  Monitor BP and cholesterol levels as needed  7. Disposition & Follow-Up  Admit to hospital for close monitoring  Discharge planning to include pulmonary follow-up  If improving, consider gradual weaning of oxygen prior to discharge    DVT prophylaxis:  Appears to be on Xarelto at home however we are currently in the process of updating his medical reconciliation.    Disposition: possible DC in am.   __________________________________________________________________  LABS/MICRO/MEDS/DIAGNOSTICS       LABS  Recent Labs     03/11/25  0430      K 4.8   CO2 23   BUN 27.3*   CREATININE 1.24   GLUCOSE 201*   CALCIUM 9.3   ALKPHOS 82   AST 32   ALT 23   ALBUMIN 3.5     Recent Labs     03/11/25  0430   WBC 3.91*   RBC 5.84   HCT 51.2   MCV 87.7   *       MICROBIOLOGY  Microbiology Results (last 7 days)       Procedure Component Value Units Date/Time    Blood Culture [[id number]]  (Normal) Collected: 03/10/25 1142    Order Status: Completed Specimen: Blood Updated: 03/11/25 1702     Blood Culture No Growth At 24 Hours    Blood Culture [[id number]]  (Normal) Collected: 03/10/25 1138    Order Status: Completed Specimen: Blood from Arm, Left Updated: 03/11/25 1702     Blood Culture No Growth At 24 Hours    Respiratory Culture [[id number]]     Order Status: Sent Specimen: Sputum             MEDICATIONS   albuterol-ipratropium  3 mL Nebulization Q4H    amLODIPine  5 mg Oral QHS    aspirin  81 mg Oral Daily    budesonide  0.5 mg Nebulization Q12H    ciprofloxacin  400 mg Intravenous Q12H    cyanocobalamin (vitamin B-12)  1,000 mcg Oral QHS    dexAMETHasone injection   8 mg Intravenous Q12H    fenofibrate  145 mg Oral Daily    multivitamin  1 tablet Oral Daily    pantoprazole  40 mg Oral Daily    rivaroxaban  2.5 mg Oral BID    vitamin D  5,000 Units Oral QHS    vortioxetine  1 tablet Oral Daily      INFUSIONS      DIAGNOSTIC TESTS  CTA Chest Non-Coronary (PE Studies)   Final Result      No CT evidence of acute pulmonary embolism or other acute intrathoracic pathology.      Both lungs demonstrate mosaic perfusion pattern with superimposed fibrotic/chronic interstitial lung changes.         Electronically signed by: Kvng Calvert   Date:    03/10/2025   Time:    11:08      X-Ray Chest 1 View   Final Result      Confluent airspace opacities in the left retrocardiac region might represent an infiltrate/atelectasis.      Otherwise no other significant change         Electronically signed by: Stevie Evans   Date:    03/10/2025   Time:    10:41             Patient information was obtained from patient, patient's family, past medical records and ER records.   All diagnosis and differential diagnosis have been reviewed; assessment and plan has been documented. I have personally reviewed the labs and test results that are presently available; I have reviewed the patients medication list. I have reviewed the consulting providers response and recommendations. I have reviewed or attempted to review medical records based upon their availability.  All of the patient's questions have been addressed and answered. Patient's is agreeable to the above stated plan. I will continue to monitor closely and make adjustments to medical management as needed.  This note was created using [employer] voice recognition software that occasionally misinterpreted phrases or words.  Please contact me if any questions may rise regarding documentation to clarify verbiage.        Leigh Layne MD   Internal Medicine  Department of Hospital Medicine [facility]         [1]   Social  History  Socioeconomic History    Marital status:    Tobacco Use    Smoking status: Never    Smokeless tobacco: Never   Substance and Sexual Activity    Alcohol use: Not Currently    Drug use: Never    Sexual activity: Not Currently     Social Drivers of Health     Financial Resource Strain: Low Risk  (3/11/2025)    Overall Financial Resource Strain (CARDIA)     Difficulty of Paying Living Expenses: Not hard at all   Food Insecurity: No Food Insecurity (3/11/2025)    Hunger Vital Sign     Worried About Running Out of Food in the Last Year: Never true     Ran Out of Food in the Last Year: Never true   Stress: No Stress Concern Present (3/11/2025)    [employer] [place] [employer] - Occupational Stress Questionnaire     Feeling of Stress : Not at all   Housing Stability: Low Risk  (3/11/2025)    Housing Stability Vital Sign     Unable to Pay for Housing in the Last Year: No     Homeless in the Last Year: No

## 2025-03-12 NOTE — PLAN OF CARE
Problem: Infection  Goal: Absence of Infection Signs and Symptoms  Outcome: Progressing  Intervention: Prevent or Manage Infection  Flowsheets (Taken 3/12/2025 0114)  Fever Reduction/Comfort Measures:   lightweight bedding   lightweight clothing  Infection Management: aseptic technique maintained  Isolation Precautions:   protective   precautions maintained     Problem: Gas Exchange Impaired  Goal: Optimal Gas Exchange  Outcome: Progressing  Intervention: Optimize Oxygenation and Ventilation  Flowsheets (Taken 3/12/2025 0114)  Airway/Ventilation Management: airway patency maintained  Head of Bed (HOB) Positioning: HOB at 20-30 degrees

## 2025-03-13 VITALS
RESPIRATION RATE: 18 BRPM | HEIGHT: 68 IN | SYSTOLIC BLOOD PRESSURE: 166 MMHG | OXYGEN SATURATION: 95 % | WEIGHT: 228.19 LBS | TEMPERATURE: 98 F | HEART RATE: 81 BPM | DIASTOLIC BLOOD PRESSURE: 83 MMHG | BODY MASS INDEX: 34.59 KG/M2

## 2025-03-13 LAB
BASOPHILS # BLD AUTO: 0.01 X10(3)/MCL
BASOPHILS NFR BLD AUTO: 0.1 %
EOSINOPHIL # BLD AUTO: 0 X10(3)/MCL (ref 0–0.9)
EOSINOPHIL NFR BLD AUTO: 0 %
ERYTHROCYTE [DISTWIDTH] IN BLOOD BY AUTOMATED COUNT: 15.1 % (ref 11.5–17)
HCT VFR BLD AUTO: 49.6 % (ref 42–52)
HGB BLD-MCNC: 15.9 G/DL (ref 14–18)
IMM GRANULOCYTES # BLD AUTO: 0.04 X10(3)/MCL (ref 0–0.04)
IMM GRANULOCYTES NFR BLD AUTO: 0.5 %
LYMPHOCYTES # BLD AUTO: 0.5 X10(3)/MCL (ref 0.6–4.6)
LYMPHOCYTES NFR BLD AUTO: 5.8 %
MCH RBC QN AUTO: 28 PG (ref 27–31)
MCHC RBC AUTO-ENTMCNC: 32.1 G/DL (ref 33–36)
MCV RBC AUTO: 87.3 FL (ref 80–94)
MONOCYTES # BLD AUTO: 0.27 X10(3)/MCL (ref 0.1–1.3)
MONOCYTES NFR BLD AUTO: 3.2 %
NEUTROPHILS # BLD AUTO: 7.74 X10(3)/MCL (ref 2.1–9.2)
NEUTROPHILS NFR BLD AUTO: 90.4 %
PLATELET # BLD AUTO: 159 X10(3)/MCL (ref 130–400)
PMV BLD AUTO: 10.5 FL (ref 7.4–10.4)
RBC # BLD AUTO: 5.68 X10(6)/MCL (ref 4.7–6.1)
WBC # BLD AUTO: 8.56 X10(3)/MCL (ref 4.5–11.5)

## 2025-03-13 PROCEDURE — 94640 AIRWAY INHALATION TREATMENT: CPT

## 2025-03-13 PROCEDURE — 85025 COMPLETE CBC W/AUTO DIFF WBC: CPT | Performed by: INTERNAL MEDICINE

## 2025-03-13 PROCEDURE — 25000003 PHARM REV CODE 250: Performed by: INTERNAL MEDICINE

## 2025-03-13 PROCEDURE — 94760 N-INVAS EAR/PLS OXIMETRY 1: CPT

## 2025-03-13 PROCEDURE — 36415 COLL VENOUS BLD VENIPUNCTURE: CPT | Performed by: INTERNAL MEDICINE

## 2025-03-13 PROCEDURE — 63600175 PHARM REV CODE 636 W HCPCS: Performed by: INTERNAL MEDICINE

## 2025-03-13 PROCEDURE — 25000242 PHARM REV CODE 250 ALT 637 W/ HCPCS: Performed by: FAMILY MEDICINE

## 2025-03-13 PROCEDURE — 25000242 PHARM REV CODE 250 ALT 637 W/ HCPCS: Performed by: INTERNAL MEDICINE

## 2025-03-13 RX ORDER — METHYLPREDNISOLONE 4 MG/1
TABLET ORAL
Qty: 21 EACH | Refills: 0 | Status: SHIPPED | OUTPATIENT
Start: 2025-03-13 | End: 2025-04-03

## 2025-03-13 RX ORDER — DOXYCYCLINE HYCLATE 100 MG
100 TABLET ORAL 2 TIMES DAILY
Qty: 10 TABLET | Refills: 0 | Status: SHIPPED | OUTPATIENT
Start: 2025-03-13 | End: 2025-03-18

## 2025-03-13 RX ORDER — IPRATROPIUM BROMIDE AND ALBUTEROL SULFATE 2.5; .5 MG/3ML; MG/3ML
3 SOLUTION RESPIRATORY (INHALATION) EVERY 6 HOURS PRN
Qty: 75 ML | Refills: 0 | Status: SHIPPED | OUTPATIENT
Start: 2025-03-13 | End: 2026-03-13

## 2025-03-13 RX ADMIN — ASPIRIN 81 MG: 81 TABLET, COATED ORAL at 08:03

## 2025-03-13 RX ADMIN — CIPROFLOXACIN 400 MG: 2 INJECTION, SOLUTION INTRAVENOUS at 01:03

## 2025-03-13 RX ADMIN — IPRATROPIUM BROMIDE AND ALBUTEROL SULFATE 3 ML: .5; 3 SOLUTION RESPIRATORY (INHALATION) at 07:03

## 2025-03-13 RX ADMIN — FENOFIBRATE 145 MG: 145 TABLET, FILM COATED ORAL at 08:03

## 2025-03-13 RX ADMIN — THERA TABS 1 TABLET: TAB at 08:03

## 2025-03-13 RX ADMIN — IPRATROPIUM BROMIDE AND ALBUTEROL SULFATE 3 ML: .5; 3 SOLUTION RESPIRATORY (INHALATION) at 01:03

## 2025-03-13 RX ADMIN — IPRATROPIUM BROMIDE AND ALBUTEROL SULFATE 3 ML: .5; 3 SOLUTION RESPIRATORY (INHALATION) at 03:03

## 2025-03-13 RX ADMIN — VORTIOXETINE 10 MG: 10 TABLET, FILM COATED ORAL at 08:03

## 2025-03-13 RX ADMIN — BUDESONIDE 0.5 MG: 0.5 INHALANT RESPIRATORY (INHALATION) at 07:03

## 2025-03-13 RX ADMIN — RIVAROXABAN 2.5 MG: 2.5 TABLET, FILM COATED ORAL at 08:03

## 2025-03-13 RX ADMIN — PANTOPRAZOLE SODIUM 40 MG: 40 TABLET, DELAYED RELEASE ORAL at 08:03

## 2025-03-13 RX ADMIN — DEXAMETHASONE SODIUM PHOSPHATE 8 MG: 4 INJECTION, SOLUTION INTRA-ARTICULAR; INTRALESIONAL; INTRAMUSCULAR; INTRAVENOUS; SOFT TISSUE at 09:03

## 2025-03-13 NOTE — PLAN OF CARE
Patient room air sat at rest 96%  Patient room air sat while ambulating 83%  Patient oxygen saturation on 2l oxygen via NC while ambulating 93%

## 2025-03-13 NOTE — PROGRESS NOTES
Hospital Medicine  Progress Note    Patient Name: Augusto Snow  MRN: [id number]  Status: IP- Inpatient   Admission Date: 3/10/2025  Length of Stay: 2  Date of Service: 03/13/2025       CC: hospital follow-up for        SUBJECTIVE   Code Status: Full  Social drivers: I have screed the patient for housing insecurity, food insecurity, access to follow up appointments as well as issues with safety at home and have not identified them at this time but will address as needs arise.         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  He was evaluated by his primary care provider (PCP) within the last week and was prescribed two rounds of antibiotics and nebulizer treatments but has not improved. He reports persistent cough but denies fever, chills, or recent illness exposure.  In the emergency department (ED), he underwent evaluation for cardiac ischemia given his history of CAD; however, troponins were negative. His D-dimer was mildly elevated (0.72), prompting a CT chest, which was negative for pulmonary embolism (PE) but showed fibrotic and chronic interstitial lung changes consistent with his known ILD.  On presentation, he was hypoxic to 89% on room air with shortness of breath at that level, improving to 94% on 1.5L oxygen. He was negative for influenza B, COVID-19, and RSV.  Given his worsening symptoms despite outpatient treatment, he will be admitted for further management with:  IV antibiotics (Ciprofloxacin) due to underlying ILD and risk for atypical infections  Nebulizer treatments for symptomatic relief  Oxygen therapy as needed for hypoxia  CPAP use at night (patient will be asked to bring his  machine)  Other pertinent history:  No leukocytosis  Normal kidney function  Extensive past surgical history  Plan is to monitor for clinical improvement and evaluate for potential exacerbation of ILD or underlying infectious process.     03/11:  Patient is on 1 L today goal is to wean to room air.  Respiratory therapist in nursing student in room upon assessment of patient.  03/12: Seen and examined and on RA, will make sure he does not need home O2. Review of old PFT shows severe obstruction.     3/13:  Awaiting home portable oxygen     Today: Patient seen and examined at bedside, and chart reviewed.       MEDICATIONS   Scheduled   albuterol-ipratropium  3 mL Nebulization Q4H    amLODIPine  5 mg Oral QHS    aspirin  81 mg Oral Daily    budesonide  0.5 mg Nebulization Q12H    ciprofloxacin  400 mg Intravenous Q12H    cyanocobalamin (vitamin B-12)  1,000 mcg Oral QHS    dexAMETHasone injection  8 mg Intravenous Q12H    fenofibrate  145 mg Oral Daily    multivitamin  1 tablet Oral Daily    pantoprazole  40 mg Oral Daily    rivaroxaban  2.5 mg Oral BID    vitamin D  5,000 Units Oral QHS    vortioxetine  1 tablet Oral Daily     Continuous Infusions        PHYSICAL EXAM   VITALS: T 97.6 °F (36.4 °C)   BP (!) 162/84   P 81   RR 18   O2 95 %    GENERAL: Awake and in NAD  LUNGS: CTA B/L  CVS: Normal rate  GI/: Soft, NT, bowel sounds positive.  EXTREMITIES: No peripheral edema  NEURO: AAOx3  PSYCH: Cooperative      LABS   CBC  Recent Labs     03/11/25  0430 03/13/25  0528   WBC 3.91* 8.56   RBC 5.84 5.68   HGB 16.6 15.9   HCT 51.2 49.6   MCV 87.7 87.3   MCH 28.4 28.0   MCHC 32.4* 32.1*   RDW 14.5 15.1   * 159     CHEM  Recent Labs     03/11/25  0430      K 4.8   CO2 23   BUN 27.3*   CREATININE 1.24   GLUCOSE 201*   CALCIUM 9.3   MG 2.00   PHOS 2.8   ALBUMIN 3.5   GLOBULIN 3.3   ALKPHOS 82   ALT 23   AST 32   BILITOT 0.5         MICROBIOLOGY     Microbiology Results (last 7 days)       Procedure Component  Value Units Date/Time    Blood Culture [[id number]]  (Normal) Collected: 03/10/25 1142    Order Status: Completed Specimen: Blood Updated: 03/12/25 1701     Blood Culture No Growth At 48 Hours    Blood Culture [[id number]]  (Normal) Collected: 03/10/25 1138    Order Status: Completed Specimen: Blood from Arm, Left Updated: 03/12/25 1701     Blood Culture No Growth At 48 Hours    Respiratory Culture [[id number]]     Order Status: Sent Specimen: Sputum               DIAGNOSTICS   Echo    Left Ventricle: There is normal systolic function with a visually   estimated ejection fraction of 55 - 60%. Ejection fraction is   approximately 60%. Grade I diastolic dysfunction.    Right Ventricle: The right ventricle is normal in size. Systolic   function is normal.    Left Atrium: Mildly dilated  CTA Chest Non-Coronary (PE Studies)  Narrative: EXAMINATION:  CTA CHEST NON CORONARY (PE STUDIES)    CLINICAL HISTORY:  Pulmonary embolism (PE) suspected, positive D-dimer;    TECHNIQUE:  Axial CT images were obtained through the chest after IV administration of 100 mL Omnipaque 350 contrast.  MIP, coronal and sagittal reconstructions submitted and interpreted. Dose reduction techniques including automatic exposure control (AEC) were utilized.    Total  mGycm    COMPARISON:  Chest radiograph 03/10/2025.  CT chest 09/19/2022.    FINDINGS:  1.2 cm partially calcified nodule of the left lobe of the thyroid.    The tracheobronchial tree is patent.  No evidence of pneumothorax or pleural effusion.  Fibrotic lung changes are noted bilaterally likely related to chronic interstitial lung disease.  There is also superimposed mosaic perfusion pattern which is nonspecific.    Normal size of the cardiac chambers.  No pericardial abnormality.    No evidence of thoracic aortic aneurysm or dissection.  Mild atherosclerosis of the thoracic aorta.  Multivessel coronary artery atherosclerotic vascular disease.  Main pulmonary artery is normal in  diameter.  No evidence of pulmonary artery embolus.    No evidence of mediastinal, hilar, or axillary lymphadenopathy.    Prior cholecystectomy.  No acute abnormality of the partially imaged superior abdomen.    Chest wall is unremarkable.  Diffuse idiopathic skeletal hyperostosis of the thoracic spine.  No acute or aggressive osseous abnormality.  Degenerative changes of both glenohumeral joints.  Impression: No CT evidence of acute pulmonary embolism or other acute intrathoracic pathology.    Both lungs demonstrate mosaic perfusion pattern with superimposed fibrotic/chronic interstitial lung changes.    Electronically signed by: Kvng Calvert  Date:    03/10/2025  Time:    11:08  X-Ray Chest 1 View  Narrative: EXAMINATION:  XR CHEST 1 VIEW    CPT 17375    CLINICAL HISTORY:  Shortness of breath    COMPARISON:  February 17, 2025    FINDINGS:  Examination reveals mediastinal silhouette to be within normal limits cardiac silhouette is at the upper limits of normal.    Confluent airspace opacities identified in the left retrocardiac region with partial silhouetting of the left hemidiaphragm might represent an infiltrate/atelectasis.    No other focal consolidative changes  Impression: Confluent airspace opacities in the left retrocardiac region might represent an infiltrate/atelectasis.    Otherwise no other significant change    Electronically signed by: Stevie Evans  Date:    03/10/2025  Time:    10:41      ASSESSMENT AND PLAN            Active Hospital Problems     Diagnosis   POA    *Chronic interstitial lung disease [J84.9]   Yes       Resolved Hospital Problems   No resolved problems to display.      1. Hypoxic Respiratory Distress, Likely Infectious vs ILD Exacerbation:  Patient has fibrotic and chronic interstitial lung disease.  Admit for oxygen therapy (maintain SpO? > 92%)  Continue nebulizer treatments as needed for symptom relief  Monitor for signs of respiratory failure  ABG   2. Empiric Antibiotic  Therapy for Possible Infectious Component  IV Ciprofloxacin for broad coverage (adjust based on culture results)  Monitor for response to antibiotics  Consider sputum culture if productive cough  If no improvement, consider alternative coverage or evaluation for non-infectious etiology  3. Coronary Artery Disease (CAD), History of Stents  Troponins negative; no current ischemic changes  Continue home cardiac medications   Monitor for any new cardiac symptoms  4. Obstructive Sleep Apnea (LUIS)  Request that patient brings CPAP from home  Ensure proper CPAP use at night  5. Interstitial Lung Disease (ILD) - Chronic Fibrotic Changes  Monitor for progression of ILD or acute exacerbation  Consider pulmonary consult for further evaluation  If concern for ILD exacerbation, consider steroids (balance risks/benefits)  6. Other Chronic Conditions (HTN, Hyperlipidemia, Arthritis)  Continue home medications if stable  Monitor BP and cholesterol levels as needed  7. Disposition & Follow-Up  Admit to hospital for close monitoring  Discharge planning to include pulmonary follow-up  If improving, consider gradual weaning of oxygen prior to discharge          Aris Khan MD  [facility]

## 2025-03-13 NOTE — PROGRESS NOTES
Discharge instructions explained to patient and family. All questions answered at this time. IV removed, catheter intact. Pt home o2 delivered to hospital. Pt placed in wheelchair and wheeled to personal vehicle.

## 2025-03-13 NOTE — CARE UPDATE
03/12/25 1735   Home Oxygen Qualification   $ Home O2 Qualification Pulmonary Stress Test/6 min walk   Room Air SpO2 At Rest 95 %   Room Air SpO2 During Ambulation (!) 81 %   Heart Rate on O2 91 bpm   SpO2 Post Ambulation (!) 82 %   Post Ambulation Heart Rate 116 bpm   Home O2 Eval Comments Pt walked in hallway for six min. pt with SOB and leg weaking o2 sats in mid 80's during walked .

## 2025-03-13 NOTE — PLAN OF CARE
Spoke with patient who requested a portable concentrator for his oxygen. Referral sent o [facility]. Patient declined home health services at this time. Dr. Khan also ordered a nebulizer. Order sent to [facility].

## 2025-03-13 NOTE — DISCHARGE SUMMARY
Hospital Medicine  Discharge Summary    Patient Name: Augusto Snow  MRN: [id number]  Admit Date: 3/10/2025  Discharge Date:  3/13/2025  Status: IP- Inpatient   Length of Stay: 2      PHYSICIANS   Admitting Physician: Leigh Layne MD  Discharging Physician: Aris Khan MD.  Primary Care Physician: Chanda Lloyd, NP        DISCHARGE DIAGNOSES     1. Hypoxic Respiratory Distress, Likely Infectious vs ILD Exacerbation:  Patient has fibrotic and chronic interstitial lung disease.  Admit for oxygen therapy (maintain SpO? > 92%)  Continue nebulizer treatments as needed for symptom relief  Monitor for signs of respiratory failure  ABG   2. Empiric Antibiotic Therapy for Possible Infectious Component  IV Ciprofloxacin for broad coverage (adjust based on culture results)  Monitor for response to antibiotics  Consider sputum culture if productive cough  If no improvement, consider alternative coverage or evaluation for non-infectious etiology  3. Coronary Artery Disease (CAD), History of Stents  Troponins negative; no current ischemic changes  Continue home cardiac medications   Monitor for any new cardiac symptoms  4. Obstructive Sleep Apnea (LUIS)  Request that patient brings CPAP from home  Ensure proper CPAP use at night  5. Interstitial Lung Disease (ILD) - Chronic Fibrotic Changes  Monitor for progression of ILD or acute exacerbation  Consider pulmonary consult for further evaluation  If concern for ILD exacerbation, consider steroids (balance risks/benefits)  6. Other Chronic Conditions (HTN, Hyperlipidemia, Arthritis)  Continue home medications if stable  Monitor BP and cholesterol levels as needed    PROCEDURES   * No surgery found *         HOSPITAL COURSE      Code Status: Full  Social drivers: I have screed the patient for housing insecurity, food insecurity, access to follow up appointments as well as issues with safety at home and have not identified them at this time but will address as needs arise.          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  He was evaluated by his primary care provider (PCP) within the last week and was prescribed two rounds of antibiotics and nebulizer treatments but has not improved. He reports persistent cough but denies fever, chills, or recent illness exposure.  In the emergency department (ED), he underwent evaluation for cardiac ischemia given his history of CAD; however, troponins were negative. His D-dimer was mildly elevated (0.72), prompting a CT chest, which was negative for pulmonary embolism (PE) but showed fibrotic and chronic interstitial lung changes consistent with his known ILD.  On presentation, he was hypoxic to 89% on room air with shortness of breath at that level, improving to 94% on 1.5L oxygen. He was negative for influenza B, COVID-19, and RSV.  Given his worsening symptoms despite outpatient treatment, he will be admitted for further management with:  IV antibiotics (Ciprofloxacin) due to underlying ILD and risk for atypical infections  Nebulizer treatments for symptomatic relief  Oxygen therapy as needed for hypoxia  CPAP use at night (patient will be asked to bring his machine)  Other pertinent history:  No leukocytosis  Normal kidney function  Extensive past surgical history  Plan is to monitor for clinical improvement and evaluate for potential exacerbation of ILD or underlying infectious process.     03/11:  Patient is on 1 L today goal is to wean to room air.  Respiratory therapist in nursing student in room upon assessment of patient.  03/12: Seen and examined and on RA, will make sure he does not need home O2. Review of old PFT shows severe  obstruction.      3/13:  Awaiting home portable oxygen   Home O2 delivered   Will send home today on po course steroids, abx, cont breathing tx's and oxygen support     STATUS  Improved, Stable    DISPOSITION  Discharge to home     DIET      ACTIVITY  As tolerated      FOLLOW-UP      Follow-up Information       Chanda Lloyd, NP Follow up.    Specialty: Internal Medicine  Contact information:  [facility] [facility] [address]  [phone]               [facility] Follow up.    Contact information:  [phone]                             DISCHARGE MEDICATION RECONCILIATION        Medication List        START taking these medications      albuterol-ipratropium 2.5 mg-0.5 mg/3 mL nebulizer solution  Commonly known as: DUO-NEB  Take 3 mLs by nebulization every 6 (six) hours as needed for Wheezing. Rescue     doxycycline 100 MG tablet  Commonly known as: VIBRA-TABS  Take 1 tablet (100 mg total) by mouth 2 (two) times daily. for 5 days     methylPREDNISolone 4 mg tablet  Commonly known as: MEDROL DOSEPACK  use as directed            CHANGE how you take these medications      diazePAM 10 MG Tab  Commonly known as: VALIUM  TAKE ONE (1) TABLET BY MOUTH EVERY EIGHT (8) HOURS AS NEEDED FOR ANXIETY  What changed: See the new instructions.            CONTINUE taking these medications      amLODIPine 5 MG tablet  Commonly known as: NORVASC     aspirin 81 MG EC tablet  Commonly known as: ECOTRIN     cholecalciferol (vitamin D3) 25 mcg (1,000 unit) capsule  Commonly known as: VITAMIN D3     cyanocobalamin (vitamin B-12) 50 mcg tablet     * DEXILANT 60 mg capsule  Generic drug: dexlansoprazole     * DEXILANT 60 mg capsule  Generic drug: dexlansoprazole  TAKE ONE CAPSULE BY MOUTH EVERY DAY     fenofibrate 145 MG tablet  Commonly known as: TRICOR  Take 1 tablet (145 mg total) by mouth once daily.     multivitamin with minerals tablet     * nitroGLYCERIN 0.4 MG SL tablet  Commonly known as:  "NITROSTAT     * nitroGLYCERIN 0.4 MG SL tablet  Commonly known as: NITROSTAT     rosuvastatin 40 MG Tab  Commonly known as: CRESTOR     TRINTELLIX 10 mg Tab  Generic drug: vortioxetine     XARELTO 2.5 mg Tab  Generic drug: rivaroxaban           * This list has 4 medication(s) that are the same as other medications prescribed for you. Read the directions carefully, and ask your doctor or other care provider to review them with you.                   Where to Get Your Medications        These medications were sent to [place] [id number] [address]      Phone: [phone]   albuterol-ipratropium 2.5 mg-0.5 mg/3 mL nebulizer solution  doxycycline 100 MG tablet  methylPREDNISolone 4 mg tablet           PHYSICAL EXAM   VITALS: T 98.2 °F (36.8 °C)   BP (!) 174/85   P 85   RR 18   O2 96 %    GENERAL: Awake and in NAD  LUNGS: CTA B/L  CVS: Normal rate  GI/: Soft, NT, bowel sounds positive.  EXTREMITIES: No peripheral edema  NEURO: AAOx3  PSYCH: Cooperative        DIAGNOSITCS   CBC:   Recent Labs   Lab 03/10/25  0950 03/11/25  0430 03/13/25  0528   WBC 6.99 3.91* 8.56   HGB 17.6 16.6 15.9   HCT 54.7* 51.2 49.6    129* 159       CMP:   Recent Labs   Lab 03/10/25  0950 03/11/25  0430   CALCIUM 9.6 9.3   ALBUMIN 3.8 3.5    142   K 4.6 4.8   CO2 26 23   * 109*   BUN 25.1 27.3*   CREATININE 1.24 1.24   ALKPHOS 78 82   ALT 26 23   AST 36* 32   BILITOT 1.1 0.5   MG 1.80 2.00   PHOS  --  2.8     Estimated Creatinine Clearance: 55.4 mL/min (based on SCr of 1.24 mg/dL).    Labs within the past 24 hours have been reviewed.     COAG:  Recent Labs   Lab 03/10/25  0950   INR 1.3       CARDIAC ENZYMES: No results for input(s): "TROPONINI", "CPK", "CKMB" in the last 72 hours.     No results for input(s): "AMYLASE", "LIPASE" in the last 168 hours.    No results for input(s): "POCTGLUCOSE" in the last 72 hours.     Microbiology Results " "(last 7 days)       Procedure Component Value Units Date/Time    Blood Culture [[id number]]  (Normal) Collected: 03/10/25 1142    Order Status: Completed Specimen: Blood Updated: 03/12/25 1701     Blood Culture No Growth At 48 Hours    Blood Culture [[id number]]  (Normal) Collected: 03/10/25 1138    Order Status: Completed Specimen: Blood from Arm, Left Updated: 03/12/25 1701     Blood Culture No Growth At 48 Hours    Respiratory Culture [[id number]]     Order Status: Sent Specimen: Sputum              No results for input(s): "CHOL", "TRIG", "HDL", "LDL" in the last 72 hours.   Lab Results   Component Value Date    HGBA1C 5.5 01/30/2025        Echo  Result Date: 3/10/2025    Left Ventricle: There is normal systolic function with a visually estimated ejection fraction of 55 - 60%. Ejection fraction is approximately 60%. Grade I diastolic dysfunction.   Right Ventricle: The right ventricle is normal in size. Systolic function is normal.   Left Atrium: Mildly dilated     CTA Chest Non-Coronary (PE Studies)  Result Date: 3/10/2025  EXAMINATION: CTA CHEST NON CORONARY (PE STUDIES) CLINICAL HISTORY: Pulmonary embolism (PE) suspected, positive D-dimer; TECHNIQUE: Axial CT images were obtained through the chest after IV administration of 100 mL Omnipaque 350 contrast.  MIP, coronal and sagittal reconstructions submitted and interpreted. Dose reduction techniques including automatic exposure control (AEC) were utilized. Total  mGycm COMPARISON: Chest radiograph 03/10/2025.  CT chest 09/19/2022. FINDINGS: 1.2 cm partially calcified nodule of the left lobe of the thyroid. The tracheobronchial tree is patent.  No evidence of pneumothorax or pleural effusion.  Fibrotic lung changes are noted bilaterally likely related to chronic interstitial lung disease.  There is also superimposed mosaic perfusion pattern which is nonspecific. Normal size of the cardiac chambers.  No pericardial abnormality. No evidence of thoracic " aortic aneurysm or dissection.  Mild atherosclerosis of the thoracic aorta.  Multivessel coronary artery atherosclerotic vascular disease.  Main pulmonary artery is normal in diameter.  No evidence of pulmonary artery embolus. No evidence of mediastinal, hilar, or axillary lymphadenopathy. Prior cholecystectomy.  No acute abnormality of the partially imaged superior abdomen. Chest wall is unremarkable.  Diffuse idiopathic skeletal hyperostosis of the thoracic spine.  No acute or aggressive osseous abnormality.  Degenerative changes of both glenohumeral joints.     No CT evidence of acute pulmonary embolism or other acute intrathoracic pathology. Both lungs demonstrate mosaic perfusion pattern with superimposed fibrotic/chronic interstitial lung changes. Electronically signed by: Kvng Calvert Date:    03/10/2025 Time:    11:08    X-Ray Chest 1 View  Result Date: 3/10/2025  EXAMINATION: XR CHEST 1 VIEW CPT 64564 CLINICAL HISTORY: Shortness of breath COMPARISON: February 17, 2025 FINDINGS: Examination reveals mediastinal silhouette to be within normal limits cardiac silhouette is at the upper limits of normal. Confluent airspace opacities identified in the left retrocardiac region with partial silhouetting of the left hemidiaphragm might represent an infiltrate/atelectasis. No other focal consolidative changes     Confluent airspace opacities in the left retrocardiac region might represent an infiltrate/atelectasis. Otherwise no other significant change Electronically signed by: Stevie Evans Date:    03/10/2025 Time:    10:41    X-Ray Chest PA And Lateral  Result Date: 2/17/2025  EXAMINATION: XR CHEST PA AND LATERAL CLINICAL HISTORY: , Pneumonia, unspecified organism. COMPARISON: January 30, 2025 FINDINGS: Examination reveals mediastinal silhouette to be within normal limits cardiac silhouette is not enlarged confluent airspace opacities are identified at the left infrahilar region and left base infiltrate cannot  be completely excluded. No other focal consolidative changes are clearly identified     Slightly more confluent opacities in the left infrahilar region and left base infiltrate can not be completely excluded. Otherwise no active pulmonary disease Electronically signed by: Stevie Evans Date:    02/17/2025 Time:    10:40      N/A     Patient Screened for food insecurity, housing instability, transportation needs, utility difficulties, and interpersonal safety.  No needs identified    Discharge time: 33 minutes         Aris Khan MD  [facility] Medicine

## 2025-03-13 NOTE — PLAN OF CARE
Problem: Adult Inpatient Plan of Care  Goal: Plan of Care Review  Outcome: Progressing  Flowsheets (Taken 3/13/2025 1036)  Plan of Care Reviewed With: patient  Goal: Patient-Specific Goal (Individualized)  Outcome: Progressing  Flowsheets (Taken 3/13/2025 1036)  Individualized Care Needs: continuous pulse ox, safety with mobility, home o2  Anxieties, Fears or Concerns: none expressed at this time  Goal: Absence of Hospital-Acquired Illness or Injury  Outcome: Progressing  Intervention: Identify and Manage Fall Risk  Flowsheets (Taken 3/13/2025 1036)  Safety Promotion/Fall Prevention:   assistive device/personal item within reach   nonskid shoes/socks when out of bed   side rails raised x 2  Intervention: Prevent Skin Injury  Flowsheets (Taken 3/13/2025 1036)  Body Position:   position changed independently   legs elevated  Skin Protection: incontinence pads utilized  Device Skin Pressure Protection:   absorbent pad utilized/changed   adhesive use limited   tubing/devices free from skin contact  Intervention: Prevent and Manage VTE (Venous Thromboembolism) Risk  Flowsheets (Taken 3/13/2025 1036)  VTE Prevention/Management:   bleeding precautions maintained   bleeding risk assessed  Intervention: Prevent Infection  Flowsheets (Taken 3/13/2025 1036)  Infection Prevention:   cohorting utilized   personal protective equipment utilized   environmental surveillance performed   rest/sleep promoted   single patient room provided   equipment surfaces disinfected   hand hygiene promoted  Goal: Optimal Comfort and Wellbeing  Outcome: Progressing  Intervention: Monitor Pain and Promote Comfort  Flowsheets (Taken 3/13/2025 1036)  Pain Management Interventions:   care clustered   pillow support provided   position adjusted   relaxation techniques promoted   quiet environment facilitated  Intervention: Provide Person-Centered Care  Flowsheets (Taken 3/13/2025 1036)  Trust Relationship/Rapport:   care explained   questions  encouraged   choices provided   reassurance provided   emotional support provided   thoughts/feelings acknowledged   empathic listening provided   questions answered  Goal: Readiness for Transition of Care  Outcome: Progressing  Intervention: Mutually Develop Transition Plan  Flowsheets (Taken 3/13/2025 1036)  Communicated ALLAN with patient/caregiver: Date not available/Unable to determine     Problem: Infection  Goal: Absence of Infection Signs and Symptoms  Outcome: Progressing  Intervention: Prevent or Manage Infection  Flowsheets (Taken 3/13/2025 1036)  Fever Reduction/Comfort Measures:   lightweight bedding   lightweight clothing  Infection Management: aseptic technique maintained  Isolation Precautions:   precautions maintained   protective     Problem: Gas Exchange Impaired  Goal: Optimal Gas Exchange  Outcome: Progressing  Intervention: Optimize Oxygenation and Ventilation  Flowsheets (Taken 3/13/2025 1036)  Airway/Ventilation Management: airway patency maintained  Head of Bed (HOB) Positioning: HOB at 30-45 degrees

## 2025-03-13 NOTE — PLAN OF CARE
Problem: Adult Inpatient Plan of Care  Goal: Plan of Care Review  Outcome: Progressing  Flowsheets (Taken 3/13/2025 0511)  Plan of Care Reviewed With: patient  Goal: Patient-Specific Goal (Individualized)  Outcome: Progressing  Flowsheets (Taken 3/13/2025 0511)  Individualized Care Needs: continuous pulse ox, fall safety, mobility safety  Anxieties, Fears or Concerns: none  Patient/Family-Specific Goals (Include Timeframe): strengthening for D/C  Goal: Absence of Hospital-Acquired Illness or Injury  Outcome: Progressing  Intervention: Identify and Manage Fall Risk  Flowsheets (Taken 3/13/2025 0511)  Safety Promotion/Fall Prevention:   assistive device/personal item within reach   side rails raised x 2   nonskid shoes/socks when out of bed  Intervention: Prevent Skin Injury  Flowsheets (Taken 3/13/2025 0511)  Body Position: position changed independently  Skin Protection:   protective footwear used   incontinence pads utilized   skin sealant/moisture barrier applied  Device Skin Pressure Protection:   absorbent pad utilized/changed   tubing/devices free from skin contact  Intervention: Prevent and Manage VTE (Venous Thromboembolism) Risk  Flowsheets (Taken 3/13/2025 0511)  VTE Prevention/Management:   bleeding precautions maintained   bleeding risk assessed   ambulation promoted  Intervention: Prevent Infection  Flowsheets (Taken 3/13/2025 0511)  Infection Prevention:   cohorting utilized   environmental surveillance performed   equipment surfaces disinfected   hand hygiene promoted   single patient room provided   rest/sleep promoted   personal protective equipment utilized  Goal: Optimal Comfort and Wellbeing  Outcome: Progressing  Intervention: Monitor Pain and Promote Comfort  Flowsheets (Taken 3/13/2025 0511)  Pain Management Interventions:   pain management plan reviewed with patient/caregiver   pillow support provided  Intervention: Provide Person-Centered Care  Flowsheets (Taken 3/13/2025 0511)  Trust  Relationship/Rapport:   care explained   choices provided   questions encouraged   reassurance provided   emotional support provided   thoughts/feelings acknowledged   empathic listening provided   questions answered     Problem: Gas Exchange Impaired  Goal: Optimal Gas Exchange  Outcome: Progressing  Intervention: Optimize Oxygenation and Ventilation  Flowsheets (Taken 3/13/2025 0511)  Airway/Ventilation Management: airway patency maintained  Head of Bed (HOB) Positioning: HOB at 30-45 degrees

## 2025-03-14 ENCOUNTER — PATIENT OUTREACH (OUTPATIENT)
Dept: ADMINISTRATIVE | Facility: CLINIC | Age: 80
End: 2025-03-14
Payer: MEDICARE

## 2025-03-14 NOTE — PROGRESS NOTES
C3 nurse spoke with Augusto Snow  for a [facility] post hospital discharge follow up call. The patient has a scheduled HOSFU appointment with Chanda Lloyd NP  on 03/17/2025 @ 130 pm.

## 2025-03-14 NOTE — PROGRESS NOTES
Patient stated he received all medications from the hospital but doesn't have Trintellix. Called [facility] and spoke with Tanya. Stated patient did leave bottle of Trintellix, it's labeled and patient can  medication @ the nurse's station. Called patient back to make aware. [facility] phone number provided to patient @ request. Stated will probably  medication tomorrow; he's tired.

## 2025-03-15 LAB
BACTERIA BLD CULT: NORMAL
BACTERIA BLD CULT: NORMAL

## 2025-05-21 ENCOUNTER — HOSPITAL ENCOUNTER (OUTPATIENT)
Dept: RADIOLOGY | Facility: HOSPITAL | Age: 80
Discharge: HOME | End: 2025-05-21
Attending: NURSE PRACTITIONER
Payer: MEDICARE

## 2025-05-21 DIAGNOSIS — R06.2 WHEEZING: ICD-10-CM

## 2025-05-21 DIAGNOSIS — R06.2 WHEEZING: Primary | ICD-10-CM

## 2025-05-21 PROCEDURE — 71046 X-RAY EXAM CHEST 2 VIEWS: CPT | Mod: TC

## (undated) DEVICE — TROCAR ENDOPATH XCEL 5X100MM

## (undated) DEVICE — GLOVE PROTEXIS PI SYN SURG 6.0

## (undated) DEVICE — SCISSOR CURVED ENDOPATH 5MM

## (undated) DEVICE — NDL SAFETY 22G X 1.5 ECLIPSE

## (undated) DEVICE — SOL NORMAL USPCA 0.9%

## (undated) DEVICE — TROCAR ENDOPATH XCEL 11X100MM

## (undated) DEVICE — GAUZE SPONGE 4X4 12PLY

## (undated) DEVICE — BENZOIN TINCTURE CAPSULET

## (undated) DEVICE — SOL IRRI STRL WATER 1000ML

## (undated) DEVICE — GLOVE PROTEXIS BLUE LATEX 7

## (undated) DEVICE — Device

## (undated) DEVICE — SEALANT VISTASEAL FIBRIN 10ML

## (undated) DEVICE — CANNULA ENDOPATH XCEL 5X100MM

## (undated) DEVICE — GLOVE PROTEXIS PI SYN SURG 6.5

## (undated) DEVICE — SUT 2-0 ETHILON 18 FS

## (undated) DEVICE — APPLIER CLIP ENDO MED/LG 10MM

## (undated) DEVICE — SYS HYDRO-SURG IRR SMOKE EVAC

## (undated) DEVICE — RESERVOIR JACKSON-PRATT 100CC

## (undated) DEVICE — POUCH INSTRUMENT ADH 10X18IN

## (undated) DEVICE — GLOVE SURG BIOGEL LATEX SZ 7.5

## (undated) DEVICE — TROCAR ENDOPATH XCEL 11MM 10CM

## (undated) DEVICE — BAG SPEC RETRV ENDO 4X6IN DISP

## (undated) DEVICE — HEMOSTAT SURGICEL FIBRLR 2X4IN

## (undated) DEVICE — SUT VICRYL+ 27 UR-6 VIOL

## (undated) DEVICE — SUT VICRYL PLUS 4-0 FS-2 27IN

## (undated) DEVICE — APPLICATOR VISTASEAL RIG 35CM